# Patient Record
Sex: MALE | Race: WHITE | NOT HISPANIC OR LATINO | Employment: FULL TIME | URBAN - METROPOLITAN AREA
[De-identification: names, ages, dates, MRNs, and addresses within clinical notes are randomized per-mention and may not be internally consistent; named-entity substitution may affect disease eponyms.]

---

## 2017-01-24 ENCOUNTER — OFFICE VISIT (OUTPATIENT)
Dept: FAMILY MEDICINE | Facility: CLINIC | Age: 57
End: 2017-01-24

## 2017-01-24 VITALS
RESPIRATION RATE: 20 BRPM | SYSTOLIC BLOOD PRESSURE: 128 MMHG | WEIGHT: 188.6 LBS | HEIGHT: 68 IN | DIASTOLIC BLOOD PRESSURE: 78 MMHG | HEART RATE: 68 BPM | TEMPERATURE: 98.3 F | BODY MASS INDEX: 28.58 KG/M2

## 2017-01-24 DIAGNOSIS — J01.01 ACUTE RECURRENT MAXILLARY SINUSITIS: Primary | ICD-10-CM

## 2017-01-24 PROCEDURE — 99213 OFFICE O/P EST LOW 20 MIN: CPT | Performed by: PHYSICIAN ASSISTANT

## 2017-01-24 RX ORDER — FLUTICASONE PROPIONATE 50 MCG
1 SPRAY, SUSPENSION (ML) NASAL DAILY
Qty: 1 BOTTLE | COMMUNITY
Start: 2017-01-24 | End: 2019-04-24

## 2017-01-24 RX ORDER — DOXYCYCLINE 100 MG/1
100 CAPSULE ORAL 2 TIMES DAILY
Qty: 20 CAPSULE | Refills: 0 | Status: SHIPPED | OUTPATIENT
Start: 2017-01-24 | End: 2017-02-03

## 2017-01-24 NOTE — NURSING NOTE
Benedict De Anda is here for a possible sinus infection. Was sick before [place] and had Sx for 3 weeks. They went away a few days ago, but are now back.    Questioned patient about current smoking habits.  Pt. quit smoking some time ago.  Body mass index is 29.05 kg/(m^2).  BP Cuff left  Arm  M  Cuff  PULSE regular  My Chart:   CLASSIFICATION OF OVERWEIGHT AND OBESITY BY BMI                        Obesity Class           BMI(kg/m2)  Underweight                                    < 18.5  Normal                                         18.5-24.9  Overweight                                     25.0-29.9  OBESITY                     I                  30.0-34.9                             II                 35.0-39.9  EXTREME OBESITY             III                >40                            Patient's  BMI Body mass index is 29.05 kg/(m^2).  http://hin.nhlbi.nih.gov/menuplanner/menu.cgi    Pre-visit planning  Immunizations - up to date  Colonoscopy - is up to date  Mammogram -   Asthma -   PHQ9 -    GIA-7 -

## 2017-01-24 NOTE — MR AVS SNAPSHOT
"              After Visit Summary   2017    Benedict De Anda    MRN: [id number]           Patient Information     Date Of Birth          1960        Visit Information        Provider Department      2017 11:00 AM Brandi Santiago PA-C [facility]        Care Instructions    Take doxycycline twice daily with food for 10 days.  If not feeling significantly better in 1 week please contact me.        Follow-ups after your visit        Who to contact     If you have questions or need follow up information about today's clinic visit or your schedule please contact [facility] directly at [phone].  Normal or non-critical lab and imaging results will be communicated to you by MyChart, letter or phone within 4 business days after the clinic has received the results. If you do not hear from us within 7 days, please contact the clinic through Brilliant Telecommunications[employer] or phone. If you have a critical or abnormal lab result, we will notify you by phone as soon as possible.  Submit refill requests through AquaBling or call your pharmacy and they will forward the refill request to us. Please allow 3 business days for your refill to be completed.          Additional Information About Your Visit        MyChart Information     AquaBling lets you send messages to your doctor, view your test results, renew your prescriptions, schedule appointments and more. To sign up, go to [email or web address] . Click on \"Log in\" on the left side of the screen, which will take you to the Welcome page. Then click on \"Sign up Now\" on the right side of the page.     You will be asked to enter the access code listed below, as well as some personal information. Please follow the directions to create your username and password.     Your access code is: [email or web address]  Expires: 2017 11:20 AM     Your access code will  in 90 days. If you need help or a new code, please call your [place] clinic " "or [phone].        Care EveryWhere ID     This is your Care EveryWhere ID. This could be used by other organizations to access your [place] medical records  [id number]        Your Vitals Were     Pulse Temperature Respirations Height BMI (Body Mass Index)       68 98.3  F (36.8  C) (Oral) 20 1.716 m (5' 7.54\") 29.05 kg/m2        Blood Pressure from Last 3 Encounters:   01/24/17 128/78   03/03/15 110/70   12/31/13 148/80    Weight from Last 3 Encounters:   01/24/17 85.548 kg (188 lb 9.6 oz)   03/03/15 79.833 kg (176 lb)   12/31/13 83.915 kg (185 lb)              Today, you had the following     No orders found for display       Primary Care Provider Office Phone # Fax #    Redd Tejada [phone]       [facility] [address] [address]        Thank you!     Thank you for choosing [facility]  for your care. Our goal is always to provide you with excellent care. Hearing back from our patients is one way we can continue to improve our services. Please take a few minutes to complete the written survey that you may receive in the mail after your visit with us. Thank you!             Your Updated Medication List - Protect others around you: Learn how to safely use, store and throw away your medicines at www.disposemymeds.org.          This list is accurate as of: 1/24/17 11:20 AM.  Always use your most recent med list.                   Brand Name Dispense Instructions for use    fluticasone 50 MCG/ACT spray    FLONASE    1 Bottle    Spray 1 spray into both nostrils daily         "

## 2017-01-24 NOTE — PROGRESS NOTES
"CC: Sinus Symptoms    History:  Benedict is here today with sinus symptoms for 4 weeks since Christmas. Benedict was starting to feel better last week, but then just yesterday his sinus symptoms flared back up again. Started having drainage, facial pressure, pain, headaches, and ear pressure. Minimal throat irritation. Has a history of these roughly every year. Thinks it might be due to his allergies to cats, dust, and other environmental allergies, but he does not take anything for these on a daily basis.     No fever, sweats, chills. Has tried OTC Nyquil, and during 1st three weeks was trying all sorts of OTC meds includingdaily Flonase, which he continues to use.    PMH, MEDICATIONS, ALLERGIES, SOCIAL AND FAMILY HISTORY in [facility] and reviewed by me personally.      ROS negative other than the symptoms noted above in the HPI.        Examination   /78 mmHg  Pulse 68  Temp(Src) 98.3  F (36.8  C) (Oral)  Resp 20  Ht 1.716 m (5' 7.54\")  Wt 85.548 kg (188 lb 9.6 oz)  BMI 29.05 kg/m2       Constitutional: Sitting comfortably, in no acute distress. Vital signs noted  Eyes: pupils equal round reactive to light and accomodation, extra ocular movements intact  Ears: external canals and TMs free of abnormalities  Nose: patent, without mucosal abnormalities. Mild congestions. No sinus tenderness  Mouth and throat: without erythema or lesions of the mucosa  Neck:  no adenopathy, trachea midline and normal to palpation  Cardiovascular:  regular rate and rhythm, no murmurs, clicks, or gallops  Respiratory:  normal respiratory rate and rhythm, lungs clear to auscultation  Psychiatric: mentation appears normal and affect normal/bright        A/P    ICD-10-CM    1. Acute recurrent maxillary sinusitis J01.01 doxycycline Monohydrate 100 MG CAPS       DISCUSSION: Given duration of symptoms, amoxicillin allergy, recommended Benedict complete 10 day course ofdoxycycline. He should take this twice daily with food, and should " consider taking probiotic or eating yogurt in between. Warned him of possible side effects including loose stool. Provided him with a handout on OTC therapies based on symptoms. Asked him to contact me in 1 week if not feeling significantly better.      follow up visit: as needed    Brandi Santiago PA-C  [facility]

## 2017-01-24 NOTE — PATIENT INSTRUCTIONS
Take doxycycline twice daily with food for 10 days.  If not feeling significantly better in 1 week please contact me.

## 2017-06-21 ENCOUNTER — OFFICE VISIT (OUTPATIENT)
Dept: FAMILY MEDICINE | Facility: CLINIC | Age: 57
End: 2017-06-21

## 2017-06-21 VITALS
HEIGHT: 68 IN | SYSTOLIC BLOOD PRESSURE: 120 MMHG | HEART RATE: 60 BPM | DIASTOLIC BLOOD PRESSURE: 70 MMHG | TEMPERATURE: 98.6 F | BODY MASS INDEX: 27.58 KG/M2 | RESPIRATION RATE: 20 BRPM | WEIGHT: 182 LBS

## 2017-06-21 DIAGNOSIS — J06.9 VIRAL URI WITH COUGH: Primary | ICD-10-CM

## 2017-06-21 DIAGNOSIS — B00.1 COLD SORE: ICD-10-CM

## 2017-06-21 PROCEDURE — 99213 OFFICE O/P EST LOW 20 MIN: CPT | Performed by: PHYSICIAN ASSISTANT

## 2017-06-21 RX ORDER — VALACYCLOVIR HYDROCHLORIDE 1 G/1
2000 TABLET, FILM COATED ORAL 2 TIMES DAILY
Qty: 12 TABLET | Refills: 3 | Status: SHIPPED | OUTPATIENT
Start: 2017-06-21 | End: 2018-03-05

## 2017-06-21 NOTE — NURSING NOTE
Benedict De Anda presents with an illness characterized by productive cough with sputum, ear pain, ear plugging, nasal congestion and rhinorrhea. Symptoms began 4 days ago and are gradually worsening since that time.  Questioned patient about current smoking habits.  Pt. quit smoking some time ago.  Body mass index is 33.67 kg/(m^2).  PULSE regular  My Chart:   Body mass index is 27.88 kg/(m^2).  Pre-visit planning  Immunizations - up to date  Colonoscopy - is up to date  Mammogram -   Asthma -   PHQ9 -    GIA-7 -

## 2017-06-21 NOTE — PROGRESS NOTES
"CC: Cold    History:  Martín is here today concerned about a cold he developed this week. One grandson was diagnosed with pneumonia that they  for during the week. Another is sick as well. Was feeling a little \"funky\" on Monday. Yesterday mild symptoms. This morning symptoms were much worse- with sinus pain, headache, wet deep cough.  No fevers, sweats, chills. Was able to hike yesterday, but last night got worse. Cough is day and night. Last 2 nights stomach has been upset.     Has been using Flonase as usual. Has not tried any cough medications.    Oldest daughter is visiting middle of next week with her kids.    He also is getting a cold sore and would like refill of Valtrex.     PMH, MEDICATIONS, ALLERGIES, SOCIAL AND FAMILY HISTORY in [facility] and reviewed by me personally.      ROS negative other than the symptoms noted above in the HPI.        Examination   /70 (BP Location: Right arm, Patient Position: Chair, Cuff Size: Adult Regular)  Pulse 60  Temp 98.6  F (37  C) (Oral)  Resp 20  Ht 1.721 m (5' 7.75\")  Wt 82.6 kg (182 lb)  BMI 27.88 kg/m2       Constitutional: Sitting comfortably, in no acute distress. Vital signs noted  Eyes: pupils equal round reactive to light and accomodation, extra ocular movements intact  Ears: external canals and TMs free of abnormalities  Nose: patent, without mucosal abnormalities  Mouth and throat: without erythema or lesions of the mucosa  Neck:  no adenopathy, trachea midline and normal to palpation  Cardiovascular:  regular rate and rhythm, no murmurs, clicks, or gallops  Respiratory:  normal respiratory rate and rhythm, lungs clear to auscultation  Psychiatric: mentation appears normal and affect normal/bright        A/P    ICD-10-CM    1. Viral URI with cough J06.9     B97.89    2. Cold sore B00.1 valACYclovir (VALTREX) 1000 mg tablet       DISCUSSION: Martín symptoms are likely just a virus that needs to run it's course. Recommended taking Mucinex DM, rest, and " hydrating as much as possible. He should contact me on Monday if not better, and we could start on antibiotics at that time. He should contact me sooner with worsening symptoms.    follow up visit: As needed    [facility]

## 2017-06-21 NOTE — MR AVS SNAPSHOT
"              After Visit Summary   2017    Benedict De Anda    MRN: [id number]           Patient Information     Date Of Birth          1960        Visit Information        Provider Department      2017 10:15 AM Brandi Santiago PA-C [facility]        Today's Diagnoses     Viral URI with cough    -  1    Cold sore           Follow-ups after your visit        Follow-up notes from your care team     Return if symptoms worsen or fail to improve.      Who to contact     If you have questions or need follow up information about today's clinic visit or your schedule please contact [facility] directly at [phone].  Normal or non-critical lab and imaging results will be communicated to you by DadShedhart, letter or phone within 4 business days after the clinic has received the results. If you do not hear from us within 7 days, please contact the clinic through DadShed[employer] or phone. If you have a critical or abnormal lab result, we will notify you by phone as soon as possible.  Submit refill requests through [employer] Towers or call your pharmacy and they will forward the refill request to us. Please allow 3 business days for your refill to be completed.          Additional Information About Your Visit        MyChart Information     Russian Towers lets you send messages to your doctor, view your test results, renew your prescriptions, schedule appointments and more. To sign up, go to [email or web address] . Click on \"Log in\" on the left side of the screen, which will take you to the Welcome page. Then click on \"Sign up Now\" on the right side of the page.     You will be asked to enter the access code listed below, as well as some personal information. Please follow the directions to create your username and password.     Your access code is: [email or web address]  Expires: 2017 12:58 PM     Your access code will  in 90 days. If you need help or a new code, please call " "your [place] clinic or [phone].        Care EveryWhere ID     This is your Care EveryWhere ID. This could be used by other organizations to access your [place] medical records  [id number]        Your Vitals Were     Pulse Temperature Respirations Height BMI (Body Mass Index)       60 98.6  F (37  C) (Oral) 20 1.721 m (5' 7.75\") 27.88 kg/m2        Blood Pressure from Last 3 Encounters:   06/21/17 120/70   01/24/17 128/78   03/03/15 110/70    Weight from Last 3 Encounters:   06/21/17 82.6 kg (182 lb)   01/24/17 85.5 kg (188 lb 9.6 oz)   03/03/15 79.8 kg (176 lb)              Today, you had the following     No orders found for display         Today's Medication Changes          These changes are accurate as of: 6/21/17 12:58 PM.  If you have any questions, ask your nurse or doctor.               Start taking these medicines.        Dose/Directions    valACYclovir 1000 mg tablet   Commonly known as:  VALTREX   Used for:  Cold sore   Started by:  Brandi Santiago PA-C        Dose:  2000 mg   Take 2 tablets (2,000 mg) by mouth 2 times daily   Quantity:  12 tablet   Refills:  3            Where to get your medicines      These medications were sent to [facility] [id number] [place] [address] [address]  [address], [facility] [address]     Phone:  [phone]     valACYclovir 1000 mg tablet                Primary Care Provider Office Phone # Fax #    Redd Tejada [phone]       [facility] [address] [facility] [address]  [facility] [address]        Equal Access to Services     KARIN SALGADO AH: Sharon Espinosa, waamina garibay, qayossita kaalmada ivan, lala kirk. So [facility] [phone].    ATENCIÓN: Si habla español, tiene a gipson disposición servicios gratuitos de asistencia lingüística. Latha al [phone].    We comply with applicable federal civil rights " laws and [place] laws. We do not discriminate on the basis of race, color, national origin, age, disability sex, sexual orientation or gender identity.            Thank you!     Thank you for choosing [facility]  for your care. Our goal is always to provide you with excellent care. Hearing back from our patients is one way we can continue to improve our services. Please take a few minutes to complete the written survey that you may receive in the mail after your visit with us. Thank you!             Your Updated Medication List - Protect others around you: Learn how to safely use, store and throw away your medicines at www.disposemymeds.org.          This list is accurate as of: 6/21/17 12:58 PM.  Always use your most recent med list.                   Brand Name Dispense Instructions for use Diagnosis    fluticasone 50 MCG/ACT spray    FLONASE    1 Bottle    Spray 1 spray into both nostrils daily        valACYclovir 1000 mg tablet    VALTREX    12 tablet    Take 2 tablets (2,000 mg) by mouth 2 times daily    Cold sore

## 2018-02-22 ENCOUNTER — OFFICE VISIT (OUTPATIENT)
Dept: FAMILY MEDICINE | Facility: CLINIC | Age: 58
End: 2018-02-22

## 2018-02-22 VITALS
BODY MASS INDEX: 27.22 KG/M2 | HEART RATE: 65 BPM | OXYGEN SATURATION: 98 % | WEIGHT: 179.6 LBS | SYSTOLIC BLOOD PRESSURE: 128 MMHG | TEMPERATURE: 98.9 F | DIASTOLIC BLOOD PRESSURE: 82 MMHG | HEIGHT: 68 IN

## 2018-02-22 DIAGNOSIS — Z29.89 ALTITUDE SICKNESS PROPHYLAXIS: ICD-10-CM

## 2018-02-22 DIAGNOSIS — Z00.00 ROUTINE GENERAL MEDICAL EXAMINATION AT A HEALTH CARE FACILITY: Primary | ICD-10-CM

## 2018-02-22 PROCEDURE — 84153 ASSAY OF PSA TOTAL: CPT | Mod: 90 | Performed by: FAMILY MEDICINE

## 2018-02-22 PROCEDURE — 99396 PREV VISIT EST AGE 40-64: CPT | Performed by: FAMILY MEDICINE

## 2018-02-22 PROCEDURE — 80061 LIPID PANEL: CPT | Mod: 90 | Performed by: FAMILY MEDICINE

## 2018-02-22 PROCEDURE — 36415 COLL VENOUS BLD VENIPUNCTURE: CPT | Performed by: FAMILY MEDICINE

## 2018-02-22 PROCEDURE — 86803 HEPATITIS C AB TEST: CPT | Mod: 90 | Performed by: FAMILY MEDICINE

## 2018-02-22 PROCEDURE — 80053 COMPREHEN METABOLIC PANEL: CPT | Mod: 90 | Performed by: FAMILY MEDICINE

## 2018-02-22 RX ORDER — ACETAZOLAMIDE 500 MG/1
500 CAPSULE, EXTENDED RELEASE ORAL 2 TIMES DAILY
Qty: 30 CAPSULE | Refills: 0 | Status: SHIPPED | OUTPATIENT
Start: 2018-02-22 | End: 2018-11-29

## 2018-02-22 ASSESSMENT — ANXIETY QUESTIONNAIRES
1. FEELING NERVOUS, ANXIOUS, OR ON EDGE: NOT AT ALL
GAD7 TOTAL SCORE: 1
2. NOT BEING ABLE TO STOP OR CONTROL WORRYING: NOT AT ALL
3. WORRYING TOO MUCH ABOUT DIFFERENT THINGS: NOT AT ALL
IF YOU CHECKED OFF ANY PROBLEMS ON THIS QUESTIONNAIRE, HOW DIFFICULT HAVE THESE PROBLEMS MADE IT FOR YOU TO DO YOUR WORK, TAKE CARE OF THINGS AT HOME, OR GET ALONG WITH OTHER PEOPLE: NOT DIFFICULT AT ALL
6. BECOMING EASILY ANNOYED OR IRRITABLE: SEVERAL DAYS
5. BEING SO RESTLESS THAT IT IS HARD TO SIT STILL: NOT AT ALL
7. FEELING AFRAID AS IF SOMETHING AWFUL MIGHT HAPPEN: NOT AT ALL

## 2018-02-22 ASSESSMENT — PATIENT HEALTH QUESTIONNAIRE - PHQ9: 5. POOR APPETITE OR OVEREATING: NOT AT ALL

## 2018-02-22 NOTE — NURSING NOTE
Benedict KARON De Anda is here for a full physical exam.     FASTING: Yes HOURS: 12+    Pre-Visit Screening :    Immunizations : up to date  Colon Screening : is up to date  Asthma Action Test/Plan : NA  PHQ9 :  is completed today  GAD7 :  is completed today    Patient's  BMI Body mass index is 27.71 kg/(m^2).    Questioned patient about current smoking habits.  Pt. quit smoking some time ago.    ETOH screening:    Questions:  1-How often do you have a drink containing alcohol?                             2 times per week(s)  2-How many drinks containing alcohol do you have on a typical day when you are         Drinking?                             2 to 5   3- How often do you have 5 or more drinks on one occasion?                             1 time a month       Is it okay to leave a detailed message on cell phone's voicemail regarding today's visit? Yes    Phone # [phone]      Roomed By: Gregoria Francis CMA

## 2018-02-22 NOTE — PROGRESS NOTES
SUBJECTIVE:   CC: Benedict De Anda is an 57 year old male who presents for preventative health visit.     Healthy Habits:    Do you get at least three servings of calcium containing foods daily (dairy, green leafy vegetables, etc.)? yes    Amount of exercise or daily activities, outside of work: 5 day(s) per week    Problems taking medications regularly No    Medication side effects: No    Have you had an eye exam in the past two years? yes    Do you see a dentist twice per year? 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    Abuse: Current or Past(Physical, Sexual or Emotional)- No  Do you feel safe in your environment - Yes    Social History   Substance Use Topics     Smoking status: Former Smoker     Smokeless tobacco: Never Used      Comment: stopped age 26     Alcohol use 6.0 oz/week     10 Standard drinks or equivalent per week      If you drink alcohol do you typically have >3 drinks per day or >7 drinks per week? No                      Last PSA: No results found for: PSA    Reviewed orders with patient. Reviewed health maintenance and updated orders accordingly - Yes  BP Readings from Last 3 Encounters:   02/22/18 128/82   06/21/17 120/70   01/24/17 128/78    Wt Readings from Last 3 Encounters:   02/22/18 81.5 kg (179 lb 9.6 oz)   06/21/17 82.6 kg (182 lb)   01/24/17 85.5 kg (188 lb 9.6 oz)                  Patient Active Problem List   Diagnosis     Herpes simplex virus (HSV) infection     Allergic rhinitis     ED (erectile dysfunction)     Health Care Home     ACP (advance care planning)     Past Surgical History:   Procedure Laterality Date     HC KNEE SCOPE, W/LATERAL RELEASE  1992    knee spur       Social History   Substance Use Topics     Smoking status: Former Smoker     Smokeless tobacco: Never Used      Comment: stopped age 26     Alcohol use 6.0 oz/week     10 Standard  drinks or equivalent per week     Family History   Problem Relation Age of Onset     C.A.D. Father      bypass     Hypertension Father      Lipids Father          Current Outpatient Prescriptions   Medication Sig Dispense Refill     valACYclovir (VALTREX) 1000 mg tablet Take 2 tablets (2,000 mg) by mouth 2 times daily 12 tablet 3     fluticasone (FLONASE) 50 MCG/ACT spray Spray 1 spray into both nostrils daily 1 Bottle      Allergies   Allergen Reactions     Amoxicillin Hives     Recent Labs   Lab Test  03/22/11   0945   CR  0.97   GFRESTIMATED  82   GFRESTBLACK  >90   POTASSIUM  4.3        Reviewed and updated as needed this visit by clinical staff  Tobacco  Allergies  Meds  Problems         Reviewed and updated as needed this visit by Provider        Past Medical History:   Diagnosis Date     Allergic rhinitis, cause unspecified 8/22/2005    testing 2006     Herpes simplex without mention of complication 8/22/2005      Past Surgical History:   Procedure Laterality Date      KNEE SCOPE, W/LATERAL RELEASE  1992    knee spur       ROS:  C: NEGATIVE for fever, chills, change in weight  I: NEGATIVE for worrisome rashes, moles or lesions  E: NEGATIVE for vision changes or irritation  ENT: NEGATIVE for ear, mouth and throat problems  R: NEGATIVE for significant cough or SOB  CV: NEGATIVE for chest pain, palpitations or peripheral edema  GI: NEGATIVE for nausea, abdominal pain, heartburn, or change in bowel habits   male: negative for dysuria, hematuria, decreased urinary stream, erectile dysfunction, urethral discharge  M: NEGATIVE for significant arthralgias or myalgia  N: NEGATIVE for weakness, dizziness or paresthesias  E: NEGATIVE for temperature intolerance, skin/hair changes  H: NEGATIVE for bleeding problems  P: NEGATIVE for changes in mood or affect    OBJECTIVE:   /82 (BP Location: Left arm, Patient Position: Chair, Cuff Size: Adult Large)  Pulse 65  Temp 98.9  F (37.2  C) (Oral)  Ht 1.715 m (5'  "7.5\")  Wt 81.5 kg (179 lb 9.6 oz)  SpO2 98%  BMI 27.71 kg/m2  EXAM:  GENERAL: healthy, alert and no distress  EYES: Eyes grossly normal to inspection, PERRL and conjunctivae and sclerae normal  HENT: ear canals and TM's normal, nose and mouth without ulcers or lesions  NECK: no adenopathy, no asymmetry, masses, or scars and thyroid normal to palpation  RESP: lungs clear to auscultation - no rales, rhonchi or wheezes  CV: regular rate and rhythm, normal S1 S2, no S3 or S4, no murmur, click or rub, no peripheral edema and peripheral pulses strong  ABDOMEN: soft, nontender, no hepatosplenomegaly, no masses and bowel sounds normal   (male): normal male genitalia without lesions or urethral discharge, no hernia-has varicocele on left  RECTAL (male): deferred  MS: no gross musculoskeletal defects noted, no edema  SKIN: no suspicious lesions or rashes  NEURO: Normal strength and tone, mentation intact and speech normal  PSYCH: mentation appears normal, affect normal/bright  LYMPH: no cervical, supraclavicular, axillary, or inguinal adenopathy    ASSESSMENT/PLAN:   (Z00.00) Routine general medical examination at a health care facility  (primary encounter diagnosis)  Comment: discussed preventitive healthcare   Plan: Continue to work on healthy diet and exercise, discussed healthy habits     (Z29.8) Altitude sickness prophylaxis  Comment: discussed diamox-he is going to [facility] at [address] feet  Plan: I reviewed the risks, benefits, and possible side effects of the medication.  The patient had an opportunity to ask any questions regarding the treatment plan. The patient was encouraged to call my office if any problems.     COUNSELING:  Reviewed preventive health counseling, as reflected in patient instructions       Regular exercise       Healthy diet/nutrition       Vision screening       Colon cancer screening       Prostate cancer screening       reports that he has quit smoking. He has never used smokeless " "tobacco.    Estimated body mass index is 27.71 kg/(m^2) as calculated from the following:    Height as of this encounter: 1.715 m (5' 7.5\").    Weight as of this encounter: 81.5 kg (179 lb 9.6 oz).   Weight management plan: Discussed healthy diet and exercise guidelines and patient will follow up in 12 months in clinic to re-evaluate.    Counseling Resources:  ATP IV Guidelines  Pooled Cohorts Equation Calculator  FRAX Risk Assessment  ICSI Preventive Guidelines  Dietary Guidelines for Americans, 2010  USDA's MyPlate  ASA Prophylaxis  Lung CA Screening    Redd Tejada MD  [facility]  "

## 2018-02-22 NOTE — LETTER
February 27, 2018      Benedict S Adilson  [address]  [facility] [address]      Benedict BRANTLEY Adilson     Your liver tests returned elevated in a pattern most consistent with excessive alcohol use.  I would recommend decreasing alcohol intake and we can recheck in 6 months.    The results of your recent hepatitis C test, Blood Sugar, Kidney Tests, Lipid profile and PSA were essentially within normal limits. The results are now released on My Chart. Please contact me if you have further questions or concerns.    Sincerely,    JOSE DAVID Tejada M.D.     Resulted Orders   Lipid Profile (QUEST)   Result Value Ref Range    Cholesterol 137 <200 mg/dL    HDL Cholesterol 51 >40 mg/dL    Triglycerides 46 <150 mg/dL    LDL Cholesterol Calculated 73 mg/dL (calc)      Comment:      Reference range: <100     Desirable range <100 mg/dL for patients with CHD or  diabetes and <70 mg/dL for diabetic patients with  known heart disease.     LDL-C is now calculated using the Jose   calculation, which is a validated novel method providing   better accuracy than the Friedewald equation in the   estimation of LDL-C.   Lavelle SS et al. TD. 2013;310(19): 9281-3332   (http://education.SeGan Angel Prints.SafetySkills/faq/PQV992)      Cholesterol/HDL Ratio 2.7 <5.0 (calc)    Non HDL Cholesterol 86 <130 mg/dL (calc)      Comment:      For patients with diabetes plus 1 major ASCVD risk   factor, treating to a non-HDL-C goal of <100 mg/dL   (LDL-C of <70 mg/dL) is considered a therapeutic   option.     COMPREHENSIVE METABOLIC PANEL (QUEST) XCMP   Result Value Ref Range    Glucose 101 (H) 65 - 99 mg/dL      Comment:                    Fasting reference interval     For someone without known diabetes, a glucose value  between 100 and 125 mg/dL is consistent with  prediabetes and should be confirmed with a  follow-up test.         Urea Nitrogen 19 7 - 25 mg/dL    Creatinine 1.11 0.70 - 1.33 mg/dL      Comment:      For patients >49  years of age, the reference limit  for Creatinine is approximately 13% higher for people  identified as -American.         GFR Estimate 74 > OR = 60 mL/min/1.73m2    EGFR African American 86 > OR = 60 mL/min/1.73m2    BUN/Creatinine Ratio NOT APPLICABLE 6 - 22 (calc)    Sodium 145 135 - 146 mmol/L    Potassium 4.4 3.5 - 5.3 mmol/L    Chloride 109 98 - 110 mmol/L    Carbon Dioxide 18 (L) 20 - 31 mmol/L    Calcium 9.4 8.6 - 10.3 mg/dL    Protein Total 7.3 6.1 - 8.1 g/dL    Albumin 4.7 3.6 - 5.1 g/dL    Globulin Calculated 2.6 1.9 - 3.7 g/dL (calc)    A/G Ratio 1.8 1.0 - 2.5 (calc)    Bilirubin Total 0.9 0.2 - 1.2 mg/dL    Alkaline Phosphatase 62 40 - 115 U/L     (H) 10 - 35 U/L    ALT 62 (H) 9 - 46 U/L   Hepatits C antibody (QUEST)   Result Value Ref Range    HCV Antibody NON-REACTIVE NON-REACTIVE    SIGNAL TO CUT OFF - QUEST 0.01 <1.00   HCL PSA, SCREENING (QUEST)   Result Value Ref Range    Abbott PSA 0.8 < OR = 4.0 ng/mL      Comment:      The total PSA value from this assay system is   standardized against the WHO standard. The test   result will be approximately 20% lower when compared   to the equimolar-standardized total PSA (Rodrigo   [place]). Comparison of serial PSA results should be   interpreted with this fact in mind.     This test was performed using the Siemens   chemiluminescent method. Values obtained from   different assay methods cannot be used  interchangeably. PSA levels, regardless of  value, should not be interpreted as absolute  evidence of the presence or absence of disease.         If you have any questions or concerns, please call the clinic at the number listed above.       Sincerely,        Redd Tejada MD

## 2018-02-22 NOTE — MR AVS SNAPSHOT
"              After Visit Summary   2/22/2018    Benedict De Anda    MRN: [id number]           Patient Information     Date Of Birth          1960        Visit Information        Provider Department      2/22/2018 12:30 PM Redd Tejada MD [facility]        Today's Diagnoses     Routine general medical examination at a health care facility    -  1    Altitude sickness prophylaxis           Follow-ups after your visit        Who to contact     If you have questions or need follow up information about today's clinic visit or your schedule please contact [facility] directly at [phone].  Normal or non-critical lab and imaging results will be communicated to you by MyChart, letter or phone within 4 business days after the clinic has received the results. If you do not hear from us within 7 days, please contact the clinic through MyChart or phone. If you have a critical or abnormal lab result, we will notify you by phone as soon as possible.  Submit refill requests through H.BLOOM or call your pharmacy and they will forward the refill request to us. Please allow 3 business days for your refill to be completed.          Additional Information About Your Visit        Care EveryWhere ID     This is your Care EveryWhere ID. This could be used by other organizations to access your [place] medical records  [id number]        Your Vitals Were     Pulse Temperature Height Pulse Oximetry BMI (Body Mass Index)       65 98.9  F (37.2  C) (Oral) 1.715 m (5' 7.5\") 98% 27.71 kg/m2        Blood Pressure from Last 3 Encounters:   02/22/18 128/82   06/21/17 120/70   01/24/17 128/78    Weight from Last 3 Encounters:   02/22/18 81.5 kg (179 lb 9.6 oz)   06/21/17 82.6 kg (182 lb)   01/24/17 85.5 kg (188 lb 9.6 oz)              We Performed the Following     COMPREHENSIVE METABOLIC PANEL (QUEST) XCMP     HCL PSA, SCREENING (QUEST)     Hepatits C antibody (QUEST)     " Lipid Profile (QUEST)     VENOUS COLLECTION          Today's Medication Changes          These changes are accurate as of 2/22/18  1:16 PM.  If you have any questions, ask your nurse or doctor.               Start taking these medicines.        Dose/Directions    acetaZOLAMIDE 500 MG 12 hr capsule   Commonly known as:  DIAMOX SEQUEL   Used for:  Altitude sickness prophylaxis   Started by:  Redd Tejada MD        Dose:  500 mg   Take 1 capsule (500 mg) by mouth 2 times daily   Quantity:  30 capsule   Refills:  0            Where to get your medicines      These medications were sent to [facility] [address] - [place], [place] - [address]  [address], [facility] [address]     Phone:  [phone]     acetaZOLAMIDE 500 MG 12 hr capsule                Primary Care Provider Office Phone # Fax #    Redd Tejada [phone] [address] [facility] [address]  [facility] [address]        Equal Access to Services     [facility] AH: Hadii aad ku hadasho Soomaali, waaxda luqadaha, qaybta kaalmada adeegyada, waxay idiin hayaan adeeg kharash la'sonja . So [facility] [phone].    ATENCIÓN: Si habla español, tiene a gipson disposición servicios gratuitos de asistencia lingüística. [facility] [phone].    We comply with applicable federal civil rights laws and [place] laws. We do not discriminate on the basis of race, color, national origin, age, disability, sex, sexual orientation, or gender identity.            Thank you!     Thank you for choosing [facility]  for your care. Our goal is always to provide you with excellent care. Hearing back from our patients is one way we can continue to improve our services. Please take a few minutes to complete the written survey that you may receive in the mail after your visit with us. Thank you!             Your Updated Medication List - Protect others around you: Learn  how to safely use, store and throw away your medicines at www.disposemymeds.org.          This list is accurate as of 2/22/18  1:16 PM.  Always use your most recent med list.                   Brand Name Dispense Instructions for use Diagnosis    acetaZOLAMIDE 500 MG 12 hr capsule    DIAMOX SEQUEL    30 capsule    Take 1 capsule (500 mg) by mouth 2 times daily    Altitude sickness prophylaxis       fluticasone 50 MCG/ACT spray    FLONASE    1 Bottle    Spray 1 spray into both nostrils daily        valACYclovir 1000 mg tablet    VALTREX    12 tablet    Take 2 tablets (2,000 mg) by mouth 2 times daily    Cold sore

## 2018-02-23 LAB
ABBOTT PSA - QUEST: 0.8 NG/ML
ALBUMIN SERPL-MCNC: 4.7 G/DL (ref 3.6–5.1)
ALBUMIN/GLOB SERPL: 1.8 (CALC) (ref 1–2.5)
ALP SERPL-CCNC: 62 U/L (ref 40–115)
ALT SERPL-CCNC: 62 U/L (ref 9–46)
AST SERPL-CCNC: 188 U/L (ref 10–35)
BILIRUB SERPL-MCNC: 0.9 MG/DL (ref 0.2–1.2)
BUN SERPL-MCNC: 19 MG/DL (ref 7–25)
BUN/CREATININE RATIO: ABNORMAL (CALC) (ref 6–22)
CALCIUM SERPL-MCNC: 9.4 MG/DL (ref 8.6–10.3)
CHLORIDE SERPLBLD-SCNC: 109 MMOL/L (ref 98–110)
CHOLEST SERPL-MCNC: 137 MG/DL
CHOLEST/HDLC SERPL: 2.7 (CALC)
CO2 SERPL-SCNC: 18 MMOL/L (ref 20–31)
CREAT SERPL-MCNC: 1.11 MG/DL (ref 0.7–1.33)
EGFR AFRICAN AMERICAN - QUEST: 86 ML/MIN/1.73M2
GFR SERPL CREATININE-BSD FRML MDRD: 74 ML/MIN/1.73M2
GLOBULIN, CALCULATED - QUEST: 2.6 G/DL (CALC) (ref 1.9–3.7)
GLUCOSE - QUEST: 101 MG/DL (ref 65–99)
HCV AB - QUEST: NORMAL
HDLC SERPL-MCNC: 51 MG/DL
LDLC SERPL CALC-MCNC: 73 MG/DL (CALC)
NONHDLC SERPL-MCNC: 86 MG/DL (CALC)
POTASSIUM SERPL-SCNC: 4.4 MMOL/L (ref 3.5–5.3)
PROT SERPL-MCNC: 7.3 G/DL (ref 6.1–8.1)
SIGNAL TO CUT OFF - QUEST: 0.01
SODIUM SERPL-SCNC: 145 MMOL/L (ref 135–146)
TRIGL SERPL-MCNC: 46 MG/DL

## 2018-02-23 ASSESSMENT — PATIENT HEALTH QUESTIONNAIRE - PHQ9: SUM OF ALL RESPONSES TO PHQ QUESTIONS 1-9: 4

## 2018-02-23 ASSESSMENT — ANXIETY QUESTIONNAIRES: GAD7 TOTAL SCORE: 1

## 2018-03-05 ENCOUNTER — OFFICE VISIT (OUTPATIENT)
Dept: FAMILY MEDICINE | Facility: CLINIC | Age: 58
End: 2018-03-05

## 2018-03-05 VITALS
WEIGHT: 183 LBS | DIASTOLIC BLOOD PRESSURE: 80 MMHG | SYSTOLIC BLOOD PRESSURE: 130 MMHG | HEART RATE: 70 BPM | BODY MASS INDEX: 28.24 KG/M2 | TEMPERATURE: 98.2 F | OXYGEN SATURATION: 97 %

## 2018-03-05 DIAGNOSIS — B00.1 COLD SORE: ICD-10-CM

## 2018-03-05 DIAGNOSIS — R05.9 COUGH: Primary | ICD-10-CM

## 2018-03-05 PROCEDURE — 99213 OFFICE O/P EST LOW 20 MIN: CPT | Performed by: FAMILY MEDICINE

## 2018-03-05 RX ORDER — AZITHROMYCIN 250 MG/1
TABLET, FILM COATED ORAL
Qty: 6 TABLET | Refills: 0 | Status: SHIPPED | OUTPATIENT
Start: 2018-03-05 | End: 2018-11-29

## 2018-03-05 RX ORDER — VALACYCLOVIR HYDROCHLORIDE 1 G/1
2000 TABLET, FILM COATED ORAL 2 TIMES DAILY
Qty: 12 TABLET | Refills: 3 | Status: SHIPPED | OUTPATIENT
Start: 2018-03-05 | End: 2021-11-18

## 2018-03-05 NOTE — PROGRESS NOTES
SUBJECTIVE:   Benedict De Anda is a 57 year old male who complains of nasal congestion, headache, mild sore throat, earache, dry cough and fatigue for 7 days. He denies a history of productive cough, shortness of breath, vomiting and chest pain and denies a history of asthma. Patient does not smoke cigarettes.    GD had bad cold -felt to be viral    Pt leaving on trip to [facility] in 2 weeks    Patient Active Problem List   Diagnosis     Herpes simplex virus (HSV) infection     Allergic rhinitis     ED (erectile dysfunction)     Health Care Home     ACP (advance care planning)     Past Medical History:   Diagnosis Date     Allergic rhinitis, cause unspecified 8/22/2005    testing 2006     Herpes simplex without mention of complication 8/22/2005     Family History   Problem Relation Age of Onset     C.A.D. Father      bypass     Hypertension Father      Lipids Father      Social History     Social History     Marital status:      Spouse name: N/A     Number of children: 3     Years of education: N/A     Occupational History     Not on file.     Social History Main Topics     Smoking status: Former Smoker     Smokeless tobacco: Never Used      Comment: stopped age 26     Alcohol use 6.0 oz/week     10 Standard drinks or equivalent per week     Drug use: Not on file     Sexual activity: Yes     Partners: Female     Birth control/ protection: Surgical      Comment: hysterectomy     Other Topics Concern      Service No     Blood Transfusions No     Caffeine Concern No     coffee 2-3 cups     Occupational Exposure No     Hobby Hazards No     Sleep Concern No     Stress Concern No     Weight Concern No     Special Diet No     Back Care No     Exercise Yes     club member     Seat Belt Yes     Self-Exams Yes     Social History Narrative     Past Surgical History:   Procedure Laterality Date      KNEE SCOPE, W/LATERAL RELEASE  1992    knee spur       Current Outpatient Prescriptions on File Prior to  Visit:  acetaZOLAMIDE (DIAMOX SEQUEL) 500 MG 12 hr capsule Take 1 capsule (500 mg) by mouth 2 times daily   valACYclovir (VALTREX) 1000 mg tablet Take 2 tablets (2,000 mg) by mouth 2 times daily   fluticasone (FLONASE) 50 MCG/ACT spray Spray 1 spray into both nostrils daily     No current facility-administered medications on file prior to visit.      Allergies: Amoxicillin    Immunization History   Administered Date(s) Administered     Influenza Vaccine IM 3yrs+ 4 Valent IIV4 10/11/2017     TDAP Vaccine (Boostrix) 08/19/2009         OBJECTIVE:/80 (BP Location: Right arm, Patient Position: Chair, Cuff Size: Adult Large)  Pulse 70  Temp 98.2  F (36.8  C) (Oral)  Wt 83 kg (183 lb)  SpO2 97%  BMI 28.24 kg/m2   He appears well, vital signs are as noted by the nurse. Ears normal.  Throat and pharynx normal.  Neck supple. No adenopathy in the neck. Nose is congested. Sinuses non tender. The chest is clear, without wheezes or rales.    Cold sore left lip margin    ASSESSMENT:   Viral upper respiratory illness- Discussed viral vs. bacterial infections and need to avoid unnecessary prescribing of antibiotics to ensure that no resistance will develop. Will give prescription for antibiotic (see orders) to fill is symptoms do not improve in the next 2-3 days.     PLAN:  Symptomatic therapy suggested: push fluids, rest and use acetaminophen, ibuprofen as needed. Call or return to clinic prn if these symptoms worsen or fail to improve as anticipated.

## 2018-03-05 NOTE — MR AVS SNAPSHOT
After Visit Summary   3/5/2018    Benedict De Anda    MRN: [id number]           Patient Information     Date Of Birth          1960        Visit Information        Provider Department      3/5/2018 2:00 PM Redd Tejada MD [facility]        Today's Diagnoses     Cough    -  1    Cold sore           Follow-ups after your visit        Who to contact     If you have questions or need follow up information about today's clinic visit or your schedule please contact [facility] directly at [phone].  Normal or non-critical lab and imaging results will be communicated to you by MyChart, letter or phone within 4 business days after the clinic has received the results. If you do not hear from us within 7 days, please contact the clinic through MyChart or phone. If you have a critical or abnormal lab result, we will notify you by phone as soon as possible.  Submit refill requests through [employer] or call your pharmacy and they will forward the refill request to us. Please allow 3 business days for your refill to be completed.          Additional Information About Your Visit        Care EveryWhere ID     This is your Care EveryWhere ID. This could be used by other organizations to access your [place] medical records  [id number]        Your Vitals Were     Pulse Temperature Pulse Oximetry BMI (Body Mass Index)          70 98.2  F (36.8  C) (Oral) 97% 28.24 kg/m2         Blood Pressure from Last 3 Encounters:   03/05/18 130/80   02/22/18 128/82   06/21/17 120/70    Weight from Last 3 Encounters:   03/05/18 83 kg (183 lb)   02/22/18 81.5 kg (179 lb 9.6 oz)   06/21/17 82.6 kg (182 lb)              Today, you had the following     No orders found for display         Today's Medication Changes          These changes are accurate as of 3/5/18  2:25 PM.  If you have any questions, ask your nurse or doctor.               Start taking these medicines.         Dose/Directions    azithromycin 250 MG tablet   Commonly known as:  ZITHROMAX   Used for:  Cough   Started by:  Redd Tejada MD        Two tablets first day, then one tablet daily for four days.   Quantity:  6 tablet   Refills:  0            Where to get your medicines      These medications were sent to [facility] - [place], [place] - [address]  [address], [facility] [address]     Phone:  [phone]     azithromycin 250 MG tablet    valACYclovir 1000 mg tablet                Primary Care Provider Office Phone # Fax #    Redd Tejada [phone] [address] [facility] [address]  [facility] [address]        Equal Access to Services     [facility] : Hadii lexi ku hadasho Soomaali, waaxda luqadaha, qaybta kaalmada adeegyada, waxay idiin haylashayn neil kim . So [facility] [phone].    ATENCIÓN: Si habla español, tiene a gipson disposición servicios gratuitos de asistencia lingüística. [facility] [phone].    We comply with applicable federal civil rights laws and [place] laws. We do not discriminate on the basis of race, color, national origin, age, disability, sex, sexual orientation, or gender identity.            Thank you!     Thank you for choosing [facility]  for your care. Our goal is always to provide you with excellent care. Hearing back from our patients is one way we can continue to improve our services. Please take a few minutes to complete the written survey that you may receive in the mail after your visit with us. Thank you!             Your Updated Medication List - Protect others around you: Learn how to safely use, store and throw away your medicines at www.disposemymeds.org.          This list is accurate as of 3/5/18  2:25 PM.  Always use your most recent med list.                   Brand Name Dispense Instructions for use Diagnosis    acetaZOLAMIDE  500 MG 12 hr capsule    DIAMOX SEQUEL    30 capsule    Take 1 capsule (500 mg) by mouth 2 times daily    Altitude sickness prophylaxis       azithromycin 250 MG tablet    ZITHROMAX    6 tablet    Two tablets first day, then one tablet daily for four days.    Cough       fluticasone 50 MCG/ACT spray    FLONASE    1 Bottle    Spray 1 spray into both nostrils daily        valACYclovir 1000 mg tablet    VALTREX    12 tablet    Take 2 tablets (2,000 mg) by mouth 2 times daily    Cold sore

## 2018-03-05 NOTE — NURSING NOTE
Benedict is here for sinus and chest issues    Pre-visit Screening:  Immunizations:  up to date  Colonoscopy:  is up to date  Mammogram: NA  Asthma Action Test/Plan:  MARIANO  PHQ9:  NA  GAD7:  NA  Questioned patient about current smoking habits Pt. has never smoked.  Ok to leave detailed message on voice mail for today's visit only Yes, phone # [phone]

## 2018-11-29 ENCOUNTER — OFFICE VISIT (OUTPATIENT)
Dept: FAMILY MEDICINE | Facility: CLINIC | Age: 58
End: 2018-11-29

## 2018-11-29 VITALS
HEIGHT: 67 IN | DIASTOLIC BLOOD PRESSURE: 72 MMHG | HEART RATE: 60 BPM | SYSTOLIC BLOOD PRESSURE: 136 MMHG | BODY MASS INDEX: 28.47 KG/M2 | TEMPERATURE: 97.7 F | RESPIRATION RATE: 20 BRPM | WEIGHT: 181.4 LBS

## 2018-11-29 DIAGNOSIS — M77.01 MEDIAL EPICONDYLITIS OF ELBOW, RIGHT: Primary | ICD-10-CM

## 2018-11-29 PROCEDURE — 99213 OFFICE O/P EST LOW 20 MIN: CPT | Performed by: FAMILY MEDICINE

## 2018-11-29 RX ORDER — IBUPROFEN 600 MG/1
600 TABLET, FILM COATED ORAL 2 TIMES DAILY WITH MEALS
Qty: 60 TABLET | Refills: 0 | Status: SHIPPED | OUTPATIENT
Start: 2018-11-29 | End: 2021-11-18

## 2018-11-29 NOTE — NURSING NOTE
Benedict De Anda is here for left elbow pain for the past couple months. Has been getting worse.    Questioned patient about current smoking habits.  Pt. quit smoking some time ago.  PULSE regular  My Chart:   CLASSIFICATION OF OVERWEIGHT AND OBESITY BY BMI                        Obesity Class           BMI(kg/m2)  Underweight                                    < 18.5  Normal                                         18.5-24.9  Overweight                                     25.0-29.9  OBESITY                     I                  30.0-34.9                             II                 35.0-39.9  EXTREME OBESITY             III                >40                            Patient's  BMI Body mass index is 28.41 kg/(m^2).  http://hin.nhlbi.nih.gov/menuplanner/menu.cgi  Pre-visit planning  Immunizations - up to date  Colonoscopy - is up to date  Mammogram - na  Asthma -   PHQ9 -    GIA-7 -

## 2018-11-29 NOTE — MR AVS SNAPSHOT
"              After Visit Summary   11/29/2018    Benedict De Anda    MRN: [id number]           Patient Information     Date Of Birth          1960        Visit Information        Provider Department      11/29/2018 3:15 PM Sabina Mora MD [facility]        Today's Diagnoses     Medial epicondylitis of elbow, right    -  1      Care Instructions    This condition has been fully explained to the patient, who indicates understanding.   Treatment plan:Rehab stretches/ exercises to begin immediately and given in writing with illustrations.   rest the arm as much as practical;  use of heat and/or ice may be helpful prn; NSAID's given as trial today; consider steroid injection. If not improved call as needed.            Follow-ups after your visit        Follow-up notes from your care team     Return if symptoms worsen or fail to improve.      Who to contact     If you have questions or need follow up information about today's clinic visit or your schedule please contact [facility] directly at [phone].  Normal or non-critical lab and imaging results will be communicated to you by Constellation Researchhart, letter or phone within 4 business days after the clinic has received the results. If you do not hear from us within 7 days, please contact the clinic through Tappxt or phone. If you have a critical or abnormal lab result, we will notify you by phone as soon as possible.  Submit refill requests through Lab4U or call your pharmacy and they will forward the refill request to us. Please allow 3 business days for your refill to be completed.          Additional Information About Your Visit        Constellation ResearchharWanderu Information     Lab4U lets you send messages to your doctor, view your test results, renew your prescriptions, schedule appointments and more. To sign up, go to [email or web address] . Click on \"Log in\" on the left side of the screen, which will take you to the Welcome " "page. Then click on \"Sign up Now\" on the right side of the page.     You will be asked to enter the access code listed below, as well as some personal information. Please follow the directions to create your username and password.     Your access code is: [email or web address]  Expires: 2019  3:06 PM     Your access code will  in 90 days. If you need help or a new code, please call your [place] clinic or [phone].        Care EveryWhere ID     This is your Care EveryWhere ID. This could be used by other organizations to access your [place] medical records  [id number]        Your Vitals Were     Pulse Temperature Respirations Height BMI (Body Mass Index)       60 97.7  F (36.5  C) (Oral) 20 1.702 m (5' 7\") 28.41 kg/m2        Blood Pressure from Last 3 Encounters:   18 136/72   18 130/80   18 128/82    Weight from Last 3 Encounters:   18 82.3 kg (181 lb 6.4 oz)   18 83 kg (183 lb)   18 81.5 kg (179 lb 9.6 oz)              Today, you had the following     No orders found for display         Today's Medication Changes          These changes are accurate as of 18  3:39 PM.  If you have any questions, ask your nurse or doctor.               Start taking these medicines.        Dose/Directions    ibuprofen 600 MG tablet   Commonly known as:  ADVIL/MOTRIN   Used for:  Medial epicondylitis of elbow, right   Started by:  Sabina Mora MD        Dose:  600 mg   Take 1 tablet (600 mg) by mouth 2 times daily (with meals)   Quantity:  60 tablet   Refills:  0            Where to get your medicines      These medications were sent to [facility] - [facility] [address], [facility] [address]     Phone:  [phone]     ibuprofen 600 MG tablet                Primary Care Provider Office Phone # Fax #    Redd Tejada [phone]       [facility] [address]  [facility] [address]        Equal Access to Services     [facility] DAMIAN : Hadii aad ku hadautumnshaina Yaryali, waferdinandda luqdiogenesha, qayossita pemaashlylala neil. So [facility] [phone].    ATENCIÓN: Si habla español, tiene a gipson disposición servicios gratuitos de asistencia lingüística. Angelicaame al [phone].    We comply with applicable federal civil rights laws and [place] laws. We do not discriminate on the basis of race, color, national origin, age, disability, sex, sexual orientation, or gender identity.            Thank you!     Thank you for choosing [facility]  for your care. Our goal is always to provide you with excellent care. Hearing back from our patients is one way we can continue to improve our services. Please take a few minutes to complete the written survey that you may receive in the mail after your visit with us. Thank you!             Your Updated Medication List - Protect others around you: Learn how to safely use, store and throw away your medicines at www.disposemymeds.org.          This list is accurate as of 11/29/18  3:39 PM.  Always use your most recent med list.                   Brand Name Dispense Instructions for use Diagnosis    fluticasone 50 MCG/ACT nasal spray    FLONASE    1 Bottle    Spray 1 spray into both nostrils daily        ibuprofen 600 MG tablet    ADVIL/MOTRIN    60 tablet    Take 1 tablet (600 mg) by mouth 2 times daily (with meals)    Medial epicondylitis of elbow, right       valACYclovir 1000 mg tablet    VALTREX    12 tablet    Take 2 tablets (2,000 mg) by mouth 2 times daily    Cold sore

## 2018-11-29 NOTE — PROGRESS NOTES
SUBJECTIVE:   58 year old male complains of right medial epicondylar pain for many months. Occurs while lifting or gripping or otherwise using the extremity. Precipitating factors: no direct injury, but uses arm a lot for weight lifting. Stopped everything and better but not gone.    OBJECTIVE:  He appears well with normal vitals signs as noted.  Point tenderness over right medial epicondyle with crepitation on extension with positive Cozen's test. Elbow, wrist and hand otherwise normal.    ASSESSMENT:  Medial epicondylitis    PLAN:  This condition has been fully explained to the patient, who indicates understanding.   Treatment plan:Rehab stretches/ exercises to begin immediately and given in writing with illustrations.   rest the arm as much as practical;  use of heat and/or ice may be helpful prn; NSAID's given as trial today; consider steroid injection. If not improved call as needed.

## 2019-04-24 ENCOUNTER — OFFICE VISIT (OUTPATIENT)
Dept: FAMILY MEDICINE | Facility: CLINIC | Age: 59
End: 2019-04-24

## 2019-04-24 VITALS
HEART RATE: 60 BPM | WEIGHT: 185.8 LBS | DIASTOLIC BLOOD PRESSURE: 80 MMHG | OXYGEN SATURATION: 96 % | SYSTOLIC BLOOD PRESSURE: 118 MMHG | BODY MASS INDEX: 29.1 KG/M2 | TEMPERATURE: 97.9 F

## 2019-04-24 DIAGNOSIS — H57.89 IRRITATION OF LEFT EYE: ICD-10-CM

## 2019-04-24 DIAGNOSIS — H00.012 HORDEOLUM EXTERNUM OF RIGHT LOWER EYELID: Primary | ICD-10-CM

## 2019-04-24 DIAGNOSIS — N52.9 ERECTILE DYSFUNCTION, UNSPECIFIED ERECTILE DYSFUNCTION TYPE: ICD-10-CM

## 2019-04-24 PROCEDURE — 99213 OFFICE O/P EST LOW 20 MIN: CPT | Performed by: FAMILY MEDICINE

## 2019-04-24 RX ORDER — SILDENAFIL 100 MG/1
100 TABLET, FILM COATED ORAL DAILY PRN
Qty: 30 TABLET | Refills: 1 | Status: SHIPPED | OUTPATIENT
Start: 2019-04-24 | End: 2020-11-23

## 2019-04-24 NOTE — PROGRESS NOTES
"S:Benedict De Anda is a 58 year old male who complains of swelling of the lids  both eyes for 1 month. The left lower lid had \"stye\" first which then drained and then right developed lower stye.  He states the left lower lid still feels like something is in there.    NO persistent drainage, no fevers, no loss of vision, no tearing  The patient does not wear glasses/contacts. No history of eye trauma, chemical exposure, or severe pain. No loss of vision.    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 Father         bypass     Hypertension Father      Lipids Father      Social History     Socioeconomic History     Marital status:      Spouse name: Not on file     Number of children: 3     Years of education: Not on file     Highest education level: Not on file   Occupational History     Not on file   Social Needs     Financial resource strain: Not on file     Food insecurity:     Worry: Not on file     Inability: Not on file     Transportation needs:     Medical: Not on file     Non-medical: Not on file   Tobacco Use     Smoking status: Former Smoker     Smokeless tobacco: Never Used     Tobacco comment: stopped age 26   Substance and Sexual Activity     Alcohol use: Yes     Alcohol/week: 6.0 oz     Types: 10 Standard drinks or equivalent per week     Drug use: Not on file     Sexual activity: Yes     Partners: Female     Birth control/protection: Surgical, Post-menopausal     Comment: hysterectomy   Lifestyle     Physical activity: "     Days per week: Not on file     Minutes per session: Not on file     Stress: Not on file   Relationships     Social connections:     Talks on phone: Not on file     Gets together: Not on file     Attends Restoration service: Not on file     Active member of club or organization: Not on file     Attends meetings of clubs or organizations: Not on file     Relationship status: Not on file     Intimate partner violence:     Fear of current or ex partner: Not on file     Emotionally abused: Not on file     Physically abused: Not on file     Forced sexual activity: Not on file   Other Topics Concern      Service No     Blood Transfusions No     Caffeine Concern No     Comment: coffee 2-3 cups     Occupational Exposure No     Hobby Hazards No     Sleep Concern No     Stress Concern No     Weight Concern No     Special Diet No     Back Care No     Exercise Yes     Comment: club member     Bike Helmet Not Asked     Seat Belt Yes     Self-Exams Yes   Social History Narrative     Not on file     Past Surgical History:   Procedure Laterality Date     HC KNEE SCOPE, W/LATERAL RELEASE  1992    knee spur       Current Outpatient Medications on File Prior to Visit:  ibuprofen (ADVIL/MOTRIN) 600 MG tablet Take 1 tablet (600 mg) by mouth 2 times daily (with meals)   valACYclovir (VALTREX) 1000 mg tablet Take 2 tablets (2,000 mg) by mouth 2 times daily     No current facility-administered medications on file prior to visit.      Allergies: Amoxicillin    Immunization History   Administered Date(s) Administered     Influenza Vaccine IM 3yrs+ 4 Valent IIV4 09/30/2015, 10/11/2017, 09/19/2018     TDAP Vaccine (Boostrix) 08/19/2009        O:/80 (BP Location: Left arm, Patient Position: Sitting, Cuff Size: Adult Large)   Pulse 60   Temp 97.9  F (36.6  C) (Oral)   Wt 84.3 kg (185 lb 12.8 oz)   SpO2 96%   BMI 29.10 kg/m    Acuity OD: normal  OS: normal  OU: normal  Exam: right eye abnormal findings: lower external stye,  left eye abnormal findings: erythema lower internal lid, no stye, no abrasions  Flourescein staining: negative  A:External Hordeolum right not resolving with compresses, previous stye left with residual symptoms -no clear etiology    P:ophthalmology eval recommended this week-he will call and set up , referralentered

## 2019-04-24 NOTE — NURSING NOTE
Martín is here for bilateral eye styes.    Pre-visit Screening:  Immunizations:  up to date  Colonoscopy:  is up to date  Mammogram: NA  Asthma Action Test/Plan:  NA  PHQ9:  None  GAD7:  None  Questioned patient about current smoking habits Pt. quit smoking some time ago.  Ok to leave detailed message on voice mail for today's visit only Yes, phone # [phone]

## 2019-12-08 ENCOUNTER — HEALTH MAINTENANCE LETTER (OUTPATIENT)
Age: 59
End: 2019-12-08

## 2020-11-11 ENCOUNTER — MYC REFILL (OUTPATIENT)
Dept: FAMILY MEDICINE | Facility: CLINIC | Age: 60
End: 2020-11-11

## 2020-11-11 DIAGNOSIS — N52.9 ERECTILE DYSFUNCTION, UNSPECIFIED ERECTILE DYSFUNCTION TYPE: ICD-10-CM

## 2020-11-11 RX ORDER — SILDENAFIL 100 MG/1
100 TABLET, FILM COATED ORAL DAILY PRN
Qty: 30 TABLET | Refills: 1 | Status: CANCELLED | OUTPATIENT
Start: 2020-11-11

## 2020-11-11 NOTE — TELEPHONE ENCOUNTER
Patient is requesting a refill of   Pending Prescriptions:                       Disp   Refills    sildenafil (VIAGRA) 100 MG tablet         30 tab*1            Sig: Take 1 tablet (100 mg) by mouth daily as needed      Patient has not been seen here in over a year so this is being denied.

## 2020-11-23 ENCOUNTER — OFFICE VISIT (OUTPATIENT)
Dept: FAMILY MEDICINE | Facility: CLINIC | Age: 60
End: 2020-11-23

## 2020-11-23 VITALS
TEMPERATURE: 98.2 F | HEART RATE: 80 BPM | WEIGHT: 186.8 LBS | BODY MASS INDEX: 29.32 KG/M2 | SYSTOLIC BLOOD PRESSURE: 124 MMHG | OXYGEN SATURATION: 96 % | DIASTOLIC BLOOD PRESSURE: 80 MMHG | HEIGHT: 67 IN

## 2020-11-23 DIAGNOSIS — B00.1 RECURRENT COLD SORES: Primary | ICD-10-CM

## 2020-11-23 DIAGNOSIS — N52.9 ERECTILE DYSFUNCTION, UNSPECIFIED ERECTILE DYSFUNCTION TYPE: ICD-10-CM

## 2020-11-23 PROCEDURE — 99213 OFFICE O/P EST LOW 20 MIN: CPT | Performed by: FAMILY MEDICINE

## 2020-11-23 RX ORDER — VALACYCLOVIR HYDROCHLORIDE 1 G/1
2000 TABLET, FILM COATED ORAL 2 TIMES DAILY
Qty: 12 TABLET | Refills: 3 | Status: CANCELLED | OUTPATIENT
Start: 2020-11-23

## 2020-11-23 RX ORDER — SILDENAFIL 100 MG/1
100 TABLET, FILM COATED ORAL DAILY PRN
Qty: 30 TABLET | Refills: 1 | Status: SHIPPED | OUTPATIENT
Start: 2020-11-23 | End: 2021-11-18

## 2020-11-23 RX ORDER — VALACYCLOVIR HYDROCHLORIDE 500 MG/1
500 TABLET, FILM COATED ORAL DAILY
Qty: 90 TABLET | Refills: 3 | Status: SHIPPED | OUTPATIENT
Start: 2020-11-23 | End: 2021-11-18

## 2020-11-23 RX ORDER — SILDENAFIL 100 MG/1
100 TABLET, FILM COATED ORAL DAILY PRN
Qty: 30 TABLET | Refills: 1 | Status: SHIPPED | OUTPATIENT
Start: 2020-11-23 | End: 2020-11-23

## 2020-11-23 ASSESSMENT — MIFFLIN-ST. JEOR: SCORE: 1620.95

## 2020-11-23 NOTE — PROGRESS NOTES
"SUBJECTIVE:  Benedict De Anda, a 60 year old male scheduled an appointment to discuss the following issues:     Recurrent cold sores  Erectile dysfunction, unspecified erectile dysfunction type  Pt getting painful cold sores every month, lasting a week despite valtrex dosing on infection 2 g BID, has used suppressive therapy in past through dermatology    Pt also requests refill viagra    Medical, social, surgical, and family histories reviewed.    ROS:  CONSTITUTIONAL: NEGATIVE for fever, chills  : NEGATIVE for frequency, dysuria, or hematuria  MUSCULOSKELETAL: NEGATIVE for significant arthralgias or myalgia    OBJECTIVE:  /80 (BP Location: Left arm, Patient Position: Sitting, Cuff Size: Adult Large)   Pulse 80   Temp 98.2  F (36.8  C) (Oral)   Ht 1.702 m (5' 7\")   Wt 84.7 kg (186 lb 12.8 oz)   SpO2 96%   BMI 29.26 kg/m    EXAM:  GENERAL APPEARANCE: healthy, alert and no distress  EYES: EOMI,  PERRL  RESP: lungs clear to auscultation - no rales, rhonchi or wheezes  CV: regular rates and rhythm, normal S1 S2, no S3 or S4 and no murmur, click or rub -  ABDOMEN:  soft, nontender, no HSM or masses and bowel sounds normal  SKIN: cold sore left perioral    ASSESSMENT/PLAN:  (B00.1) Recurrent cold sores  (primary encounter diagnosis)  Comment: discussed options- given frequent flares feel suppressive therapy best  Plan: valtrex, I reviewed the risks, benefits, and possible side effects of the medication.  The patient had an opportunity to ask any questions regarding the treatment plan. The patient was encouraged to call my office if any problems.     (N52.9) Erectile dysfunction, unspecified erectile dysfunction type  Comment: stable symptomatically   Plan: sildenafil (VIAGRA) 100 MG tablet        continue current medications at current doses      "

## 2020-11-23 NOTE — TELEPHONE ENCOUNTER
Pt wife called asking if the Viagra RX could be sent to [facility]. Had OV today.    Pending Prescriptions:                       Disp   Refills    sildenafil (VIAGRA) 100 MG tablet         30 tab*1            Sig: Take 1 tablet (100 mg) by mouth daily as needed

## 2020-11-23 NOTE — NURSING NOTE
Martín is here for med check and discuss valtrex    Pre-visit Screening:  Immunizations:  up to date  Colonoscopy:  is up to date  Mammogram: NA  Asthma Action Test/Plan:  MARIANO  PHQ9:  NA  GAD7:  NA  Questioned patient about current smoking habits Pt. quit smoking some time ago.  Ok to leave detailed message on voice mail for today's visit only Yes, phone # [phone]

## 2020-11-24 ENCOUNTER — ALLIED HEALTH/NURSE VISIT (OUTPATIENT)
Dept: FAMILY MEDICINE | Facility: CLINIC | Age: 60
End: 2020-11-24

## 2020-11-24 DIAGNOSIS — Z23 NEED FOR VACCINATION: Primary | ICD-10-CM

## 2020-11-24 PROCEDURE — 90750 HZV VACC RECOMBINANT IM: CPT | Performed by: FAMILY MEDICINE

## 2020-11-24 PROCEDURE — 90471 IMMUNIZATION ADMIN: CPT | Performed by: FAMILY MEDICINE

## 2021-01-09 ENCOUNTER — HEALTH MAINTENANCE LETTER (OUTPATIENT)
Age: 61
End: 2021-01-09

## 2021-01-25 ENCOUNTER — ALLIED HEALTH/NURSE VISIT (OUTPATIENT)
Dept: FAMILY MEDICINE | Facility: CLINIC | Age: 61
End: 2021-01-25

## 2021-01-25 DIAGNOSIS — Z23 NEED FOR VACCINATION: Primary | ICD-10-CM

## 2021-01-25 PROCEDURE — 90750 HZV VACC RECOMBINANT IM: CPT | Performed by: FAMILY MEDICINE

## 2021-01-25 PROCEDURE — 90471 IMMUNIZATION ADMIN: CPT | Performed by: FAMILY MEDICINE

## 2021-10-23 ENCOUNTER — HEALTH MAINTENANCE LETTER (OUTPATIENT)
Age: 61
End: 2021-10-23

## 2021-11-10 DIAGNOSIS — B00.1 RECURRENT COLD SORES: ICD-10-CM

## 2021-11-11 RX ORDER — VALACYCLOVIR HYDROCHLORIDE 500 MG/1
TABLET, FILM COATED ORAL
Qty: 90 TABLET | Refills: 3 | COMMUNITY
Start: 2021-11-11

## 2021-11-11 NOTE — TELEPHONE ENCOUNTER
Benedict De Anda is requesting a refill of:    Refused Prescriptions:                       Disp   Refills    valACYclovir (VALTREX) 500 MG tablet [Phar*90 tab*3        Sig: TAKE 1 TABLET(500 MG) BY MOUTH DAILY  Refused By: URIEL SHIELDS  Reason for Refusal: Patient never under provider care  Reason for Refusal Comment: Needs OV for refills

## 2021-11-18 ENCOUNTER — OFFICE VISIT (OUTPATIENT)
Dept: FAMILY MEDICINE | Facility: CLINIC | Age: 61
End: 2021-11-18

## 2021-11-18 VITALS
DIASTOLIC BLOOD PRESSURE: 78 MMHG | TEMPERATURE: 97.4 F | WEIGHT: 183 LBS | BODY MASS INDEX: 28.72 KG/M2 | HEART RATE: 60 BPM | RESPIRATION RATE: 20 BRPM | SYSTOLIC BLOOD PRESSURE: 138 MMHG | HEIGHT: 67 IN

## 2021-11-18 DIAGNOSIS — N52.9 ERECTILE DYSFUNCTION, UNSPECIFIED ERECTILE DYSFUNCTION TYPE: ICD-10-CM

## 2021-11-18 DIAGNOSIS — Z23 NEED FOR TETANUS BOOSTER: ICD-10-CM

## 2021-11-18 DIAGNOSIS — Z71.89 ACP (ADVANCE CARE PLANNING): ICD-10-CM

## 2021-11-18 DIAGNOSIS — B00.1 RECURRENT COLD SORES: Primary | ICD-10-CM

## 2021-11-18 PROCEDURE — 90471 IMMUNIZATION ADMIN: CPT | Performed by: FAMILY MEDICINE

## 2021-11-18 PROCEDURE — 99213 OFFICE O/P EST LOW 20 MIN: CPT | Mod: 25 | Performed by: FAMILY MEDICINE

## 2021-11-18 PROCEDURE — 90714 TD VACC NO PRESV 7 YRS+ IM: CPT | Performed by: FAMILY MEDICINE

## 2021-11-18 RX ORDER — VALACYCLOVIR HYDROCHLORIDE 500 MG/1
500 TABLET, FILM COATED ORAL DAILY
Qty: 90 TABLET | Refills: 3 | Status: SHIPPED | OUTPATIENT
Start: 2021-11-18 | End: 2022-10-19

## 2021-11-18 RX ORDER — SILDENAFIL 100 MG/1
100 TABLET, FILM COATED ORAL DAILY PRN
Qty: 30 TABLET | Refills: 1 | Status: SHIPPED | OUTPATIENT
Start: 2021-11-18 | End: 2022-10-19

## 2021-11-18 ASSESSMENT — MIFFLIN-ST. JEOR: SCORE: 1598.71

## 2021-11-18 NOTE — PROGRESS NOTES
"  Assessment & Plan     Recurrent cold sores  Well controlled, continue current medications at current doses   - valACYclovir (VALTREX) 500 MG tablet  Dispense: 90 tablet; Refill: 3    Erectile dysfunction, unspecified erectile dysfunction type  Well controlled, continue current medications at current doses   - sildenafil (VIAGRA) 100 MG tablet  Dispense: 30 tablet; Refill: 1    Need for tetanus booster        Prescription drug management         BMI:   Estimated body mass index is 28.66 kg/m  as calculated from the following:    Height as of this encounter: 1.702 m (5' 7\").    Weight as of this encounter: 83 kg (183 lb).       FUTURE APPOINTMENTS:  Regular exercise    Recheck 1 yr    No follow-ups on file.    Redd Tejada MD  [facility]    Bonnie Resendiz is a 61 year old who presents for the following health issues     HPI     Cold Sores- pt very happy with daily use valtrex, no flares all year, no med side effects     ED- well controlled, viagra working well, .no med side effects         How many servings of fruits and vegetables do you eat daily?  2-3    On average, how many sweetened beverages do you drink each day (Examples: soda, juice, sweet tea, etc.  Do NOT count diet or artificially sweetened beverages)?   1    How many days per week do you exercise enough to make your heart beat faster? 5    How many minutes a day do you exercise enough to make your heart beat faster? 30 - 60    How many days per week do you miss taking your medication? 0        Review of Systems   Constitutional, HEENT, cardiovascular, pulmonary, gi and gu systems are negative, except as otherwise noted.      Objective    /78 (BP Location: Left arm, Patient Position: Chair, Cuff Size: Adult Regular)   Pulse 60   Temp 97.4  F (36.3  C)   Resp 20   Ht 1.702 m (5' 7\")   Wt 83 kg (183 lb)   BMI 28.66 kg/m    Body mass index is 28.66 kg/m .  Physical Exam   GENERAL: healthy, alert and no " distress  NECK: no adenopathy, no asymmetry, masses, or scars and thyroid normal to palpation  RESP: lungs clear to auscultation - no rales, rhonchi or wheezes  CV: regular rate and rhythm, normal S1 S2, no S3 or S4, no murmur, click or rub, no peripheral edema and peripheral pulses strong  ABDOMEN: soft, nontender, no hepatosplenomegaly, no masses and bowel sounds normal  MS: no gross musculoskeletal defects noted, no edema  SKIN: no suspicious lesions or rashes

## 2021-11-18 NOTE — NURSING NOTE
Benedict De Anda is here for a med check and refill    Questioned patient about current smoking habits.  Pt. has never smoked.  PULSE regular  My Chart: active  CLASSIFICATION OF OVERWEIGHT AND OBESITY BY BMI                        Obesity Class           BMI(kg/m2)  Underweight                                    < 18.5  Normal                                         18.5-24.9  Overweight                                     25.0-29.9  OBESITY                     I                  30.0-34.9                             II                 35.0-39.9  EXTREME OBESITY             III                >40                            Patient's  BMI Body mass index is 28.66 kg/m .  http://hin.nhlbi.nih.gov/menuplanner/menu.cgi  Pre-visit planning  Immunizations - up to date  Colonoscopy - is up to date  Mammogram -   Asthma -   PHQ9 -    GIA-7 -    Hearing Test -

## 2022-02-12 ENCOUNTER — HEALTH MAINTENANCE LETTER (OUTPATIENT)
Age: 62
End: 2022-02-12

## 2022-10-09 ENCOUNTER — HEALTH MAINTENANCE LETTER (OUTPATIENT)
Age: 62
End: 2022-10-09

## 2022-10-19 ENCOUNTER — OFFICE VISIT (OUTPATIENT)
Dept: FAMILY MEDICINE | Facility: CLINIC | Age: 62
End: 2022-10-19

## 2022-10-19 VITALS
BODY MASS INDEX: 27.49 KG/M2 | HEART RATE: 62 BPM | DIASTOLIC BLOOD PRESSURE: 68 MMHG | OXYGEN SATURATION: 98 % | SYSTOLIC BLOOD PRESSURE: 126 MMHG | WEIGHT: 181.4 LBS | HEIGHT: 68 IN | TEMPERATURE: 97 F

## 2022-10-19 DIAGNOSIS — N52.9 ERECTILE DYSFUNCTION, UNSPECIFIED ERECTILE DYSFUNCTION TYPE: ICD-10-CM

## 2022-10-19 DIAGNOSIS — M54.9 DISCOMFORT OF BACK: Primary | ICD-10-CM

## 2022-10-19 DIAGNOSIS — B00.1 RECURRENT COLD SORES: ICD-10-CM

## 2022-10-19 PROCEDURE — 90471 IMMUNIZATION ADMIN: CPT | Performed by: STUDENT IN AN ORGANIZED HEALTH CARE EDUCATION/TRAINING PROGRAM

## 2022-10-19 PROCEDURE — 90686 IIV4 VACC NO PRSV 0.5 ML IM: CPT | Performed by: STUDENT IN AN ORGANIZED HEALTH CARE EDUCATION/TRAINING PROGRAM

## 2022-10-19 PROCEDURE — 99213 OFFICE O/P EST LOW 20 MIN: CPT | Mod: 25 | Performed by: STUDENT IN AN ORGANIZED HEALTH CARE EDUCATION/TRAINING PROGRAM

## 2022-10-19 RX ORDER — SILDENAFIL 100 MG/1
100 TABLET, FILM COATED ORAL DAILY PRN
Qty: 30 TABLET | Refills: 3 | Status: SHIPPED | OUTPATIENT
Start: 2022-10-19 | End: 2023-09-19

## 2022-10-19 RX ORDER — VALACYCLOVIR HYDROCHLORIDE 500 MG/1
500 TABLET, FILM COATED ORAL DAILY
Qty: 90 TABLET | Refills: 3 | Status: SHIPPED | OUTPATIENT
Start: 2022-10-19 | End: 2023-10-03

## 2022-10-19 NOTE — PATIENT INSTRUCTIONS
Refills sent     Try lidocaine patch or capsaicin cream on your back. Let me know if worsening.     Call with any other questions or concerns

## 2022-10-19 NOTE — PROGRESS NOTES
"Assessment & Plan       ICD-10-CM    1. Discomfort of back  M54.9       2. Erectile dysfunction, unspecified erectile dysfunction type  N52.9 sildenafil (VIAGRA) 100 MG tablet      3. Recurrent cold sores  B00.1 valACYclovir (VALTREX) 500 MG tablet           Patient Instructions   Refills sent     Try lidocaine patch or capsaicin cream on your back. Let me know if worsening.     Call with any other questions or concerns     >20 minutes spent on the date of the encounter doing chart review, history and exam, documentation and further activities per the note    Reno Jonas MD, [facility]       Subjective     Benedict De Anda is a 61 year old male who presents to clinic today for the following health issues:    HPI   Chief Complaint   Patient presents with     Recheck Medication     Pt is here for a med check.  ED: stable, no side effects. No change in other meds.   Recurrent cold sores: stable, tolerates valtrex well, no new concerns or outbreaks.      Musculoskeletal Problem     Pt c/o a tingling sensation in the middle of the back. Intermittently goes away. Not painful, just bothersome. No rash. No injury. No LE sx.             Objective    /68 (BP Location: Right arm, Patient Position: Sitting, Cuff Size: Adult Large)   Pulse 62   Temp 97  F (36.1  C) (Temporal)   Ht 1.727 m (5' 8\")   Wt 82.3 kg (181 lb 6.4 oz)   SpO2 98%   BMI 27.58 kg/m    Body mass index is 27.58 kg/m .  Alert, NAD  NC/AT  Sclerae anicteric  RRR  Resp nonlabored  Skin warm and dry  No focal neuro deficits. Speech intact.   Appropriate affect  Lumbar spine normal appearance, full ROM, full str and sens BLE. Normal gait.    Labs reviewed       "

## 2022-10-19 NOTE — NURSING NOTE
Chief Complaint   Patient presents with     Recheck Medication     Pt is here for a med check.     Musculoskeletal Problem     Pt c/o a tingling sensation in the middle of the back. Not painful, just bothersome.     Pre-visit Screening:  Immunizations:  Flu done today.  Colonoscopy: up to date  Mammogram: na  Asthma Action Test/Plan:  na  PHQ9:  na  GAD7:  na  Questioned patient about current smoking habits Pt. has never smoked.  Ok to leave detailed message on voice mail for today's visit only yes, phone # [phone]

## 2023-03-25 ENCOUNTER — HEALTH MAINTENANCE LETTER (OUTPATIENT)
Age: 63
End: 2023-03-25

## 2023-09-18 DIAGNOSIS — N52.9 ERECTILE DYSFUNCTION, UNSPECIFIED ERECTILE DYSFUNCTION TYPE: ICD-10-CM

## 2023-09-19 RX ORDER — SILDENAFIL 100 MG/1
100 TABLET, FILM COATED ORAL DAILY PRN
Qty: 30 TABLET | Refills: 0 | Status: SHIPPED | OUTPATIENT
Start: 2023-09-19 | End: 2023-11-13

## 2023-09-19 NOTE — TELEPHONE ENCOUNTER
Pt last seen 10/19/22 due for yearly OV next month.    Benedict De Anda is requesting a refill of:    Pending Prescriptions:                       Disp   Refills    sildenafil (VIAGRA) 100 MG tablet [Pharma*30 tab*0            Sig: TAKE ONE TABLET BY MOUTH DAILY AS NEEDED

## 2023-10-03 ENCOUNTER — MYC MEDICAL ADVICE (OUTPATIENT)
Dept: FAMILY MEDICINE | Facility: CLINIC | Age: 63
End: 2023-10-03

## 2023-10-03 DIAGNOSIS — B00.1 RECURRENT COLD SORES: ICD-10-CM

## 2023-10-04 RX ORDER — VALACYCLOVIR HYDROCHLORIDE 500 MG/1
500 TABLET, FILM COATED ORAL DAILY
Qty: 30 TABLET | Refills: 0 | Status: SHIPPED | OUTPATIENT
Start: 2023-10-04 | End: 2023-11-13

## 2023-10-04 RX ORDER — VALACYCLOVIR HYDROCHLORIDE 500 MG/1
500 TABLET, FILM COATED ORAL DAILY
Qty: 90 TABLET | Refills: 3 | COMMUNITY
Start: 2023-10-04

## 2023-10-04 NOTE — TELEPHONE ENCOUNTER
Pt due for OV. Pending 1 month supply.    Benedict De Anda is requesting a refill of:    Pending Prescriptions:                       Disp   Refills    valACYclovir (VALTREX) 500 MG tablet      30 tab*0            Sig: Take 1 tablet (500 mg) by mouth daily

## 2023-10-04 NOTE — TELEPHONE ENCOUNTER
Benedict De Anda is requesting a refill of:    Refused Prescriptions:                       Disp   Refills    valACYclovir (VALTREX) 500 MG tablet [Phar*90 tab*3        Sig: TAKE 1 TABLET(500 MG) BY MOUTH DAILY  Refused By: NALLELY LOPEZ  Reason for Refusal: Duplicate    See other encounter.

## 2023-11-13 ENCOUNTER — OFFICE VISIT (OUTPATIENT)
Dept: FAMILY MEDICINE | Facility: CLINIC | Age: 63
End: 2023-11-13

## 2023-11-13 VITALS
HEIGHT: 67 IN | SYSTOLIC BLOOD PRESSURE: 132 MMHG | WEIGHT: 181.6 LBS | HEART RATE: 60 BPM | DIASTOLIC BLOOD PRESSURE: 80 MMHG | TEMPERATURE: 97.4 F | BODY MASS INDEX: 28.5 KG/M2 | RESPIRATION RATE: 20 BRPM

## 2023-11-13 DIAGNOSIS — Z12.5 SPECIAL SCREENING FOR MALIGNANT NEOPLASM OF PROSTATE: ICD-10-CM

## 2023-11-13 DIAGNOSIS — Z23 NEED FOR VACCINATION: ICD-10-CM

## 2023-11-13 DIAGNOSIS — Z00.00 ROUTINE GENERAL MEDICAL EXAMINATION AT A HEALTH CARE FACILITY: Primary | ICD-10-CM

## 2023-11-13 DIAGNOSIS — N52.9 ERECTILE DYSFUNCTION, UNSPECIFIED ERECTILE DYSFUNCTION TYPE: ICD-10-CM

## 2023-11-13 DIAGNOSIS — L57.0 ACTINIC KERATOSIS: ICD-10-CM

## 2023-11-13 DIAGNOSIS — Z13.220 SCREENING FOR LIPOID DISORDERS: ICD-10-CM

## 2023-11-13 DIAGNOSIS — B00.1 RECURRENT COLD SORES: ICD-10-CM

## 2023-11-13 DIAGNOSIS — Z13.1 SCREENING FOR DIABETES MELLITUS: ICD-10-CM

## 2023-11-13 LAB
ALBUMIN SERPL-MCNC: 4.5 G/DL (ref 3.6–5.1)
ALBUMIN/GLOB SERPL: 2 {RATIO} (ref 1–2.5)
ALP SERPL-CCNC: 65 U/L (ref 33–130)
ALT 1742-6: 19 U/L (ref 0–32)
AST 1920-8: 15 U/L (ref 0–35)
BILIRUB SERPL-MCNC: 1.1 MG/DL (ref 0.2–1.2)
BUN SERPL-MCNC: 23 MG/DL (ref 7–25)
BUN/CREATININE RATIO: 20.7 (ref 6–32)
CALCIUM SERPL-MCNC: 9.2 MG/DL (ref 8.6–10.3)
CHLORIDE SERPLBLD-SCNC: 106.8 MMOL/L (ref 98–110)
CHOLEST SERPL-MCNC: 136 MG/DL (ref 0–199)
CHOLEST/HDLC SERPL: 2 {RATIO} (ref 0–5)
CO2 SERPL-SCNC: 27.2 MMOL/L (ref 20–32)
CREAT SERPL-MCNC: 1.11 MG/DL (ref 0.6–1.3)
GLOBULIN, CALCULATED - QUEST: 2.3 (ref 1.9–3.7)
GLUCOSE SERPL-MCNC: 110 MG/DL (ref 60–99)
HDLC SERPL-MCNC: 61 MG/DL (ref 40–150)
LDLC SERPL CALC-MCNC: 67 MG/DL (ref 0–130)
POTASSIUM SERPL-SCNC: 4.9 MMOL/L (ref 3.5–5.3)
PROT SERPL-MCNC: 6.8 G/DL (ref 6.1–8.1)
SODIUM SERPL-SCNC: 141.5 MMOL/L (ref 135–146)
TRIGL SERPL-MCNC: 41 MG/DL (ref 0–149)

## 2023-11-13 PROCEDURE — 99396 PREV VISIT EST AGE 40-64: CPT | Mod: 25 | Performed by: FAMILY MEDICINE

## 2023-11-13 PROCEDURE — 36415 COLL VENOUS BLD VENIPUNCTURE: CPT | Performed by: FAMILY MEDICINE

## 2023-11-13 PROCEDURE — 17110 DESTRUCTION B9 LES UP TO 14: CPT | Performed by: FAMILY MEDICINE

## 2023-11-13 PROCEDURE — 90686 IIV4 VACC NO PRSV 0.5 ML IM: CPT | Performed by: FAMILY MEDICINE

## 2023-11-13 PROCEDURE — 90471 IMMUNIZATION ADMIN: CPT | Performed by: FAMILY MEDICINE

## 2023-11-13 PROCEDURE — 84153 ASSAY OF PSA TOTAL: CPT | Mod: 90 | Performed by: FAMILY MEDICINE

## 2023-11-13 PROCEDURE — 80053 COMPREHEN METABOLIC PANEL: CPT | Performed by: FAMILY MEDICINE

## 2023-11-13 PROCEDURE — 80061 LIPID PANEL: CPT | Performed by: FAMILY MEDICINE

## 2023-11-13 RX ORDER — SILDENAFIL 100 MG/1
100 TABLET, FILM COATED ORAL DAILY PRN
Qty: 30 TABLET | Refills: 0 | Status: SHIPPED | OUTPATIENT
Start: 2023-11-13

## 2023-11-13 RX ORDER — VALACYCLOVIR HYDROCHLORIDE 500 MG/1
500 TABLET, FILM COATED ORAL DAILY
Qty: 90 TABLET | Refills: 3 | Status: SHIPPED | OUTPATIENT
Start: 2023-11-13

## 2023-11-13 NOTE — PROGRESS NOTES
3  SUBJECTIVE:   CC: Benedict De Anda is an 62 year old male who presents for preventive health visit.       Patient has been advised of split billing requirements and indicates understanding: Yes  Healthy Habits:  Do you get at least three servings of calcium containing foods daily (dairy, green leafy vegetables, etc.)? yes  Amount of exercise or daily activities, outside of work: 4 day(s) per week  Problems taking medications regularly No  Medication side effects: No  Have you had an eye exam in the past two years? yes  Do you see a dentist twice per year? yes  Do you have sleep apnea, excessive snoring or daytime drowsiness?no          Today's PHQ-2 Score:       11/13/2023    11:05 AM 10/19/2022    12:41 PM   PHQ-2 ( 1999 Pfizer)   Q1: Little interest or pleasure in doing things 0 0   Q2: Feeling down, depressed or hopeless 0 0   PHQ-2 Score 0 0       Abuse: Current or Past(Physical, Sexual or Emotional)- No  Do you feel safe in your environment?         Social History     Tobacco Use    Smoking status: Former    Smokeless tobacco: Never    Tobacco comments:     stopped age 26   Substance Use Topics    Alcohol use: Yes     Alcohol/week: 10.0 standard drinks of alcohol     Types: 10 Standard drinks or equivalent per week     If you drink alcohol do you typically have >3 drinks per day or >7 drinks per week? No                      Last PSA:   Abbott PSA   Date Value Ref Range Status   02/22/2018 0.8 < OR = 4.0 ng/mL Final     Comment:     The total PSA value from this assay system is   standardized against the WHO standard. The test   result will be approximately 20% lower when compared   to the equimolar-standardized total PSA (Rodrigo   Stephen). Comparison of serial PSA results should be   interpreted with this fact in mind.     This test was performed using the Siemens   chemiluminescent method. Values obtained from   different assay methods cannot be used  interchangeably. PSA levels, regardless of  value,  should not be interpreted as absolute  evidence of the presence or absence of disease.         Reviewed orders with patient. Reviewed health maintenance and updated orders accordingly - Yes  BP Readings from Last 3 Encounters:   11/13/23 132/80   10/19/22 126/68   11/18/21 138/78    Wt Readings from Last 3 Encounters:   11/13/23 82.4 kg (181 lb 9.6 oz)   10/19/22 82.3 kg (181 lb 6.4 oz)   11/18/21 83 kg (183 lb)                  Patient Active Problem List   Diagnosis    Herpes simplex virus (HSV) infection    Allergic rhinitis    ED (erectile dysfunction)    Health Care Home    ACP (advance care planning)     Past Surgical History:   Procedure Laterality Date    HC KNEE SCOPE, W/LATERAL RELEASE  1992    knee spur       Social History     Tobacco Use    Smoking status: Former    Smokeless tobacco: Never    Tobacco comments:     stopped age 26   Substance Use Topics    Alcohol use: Yes     Alcohol/week: 4.0 standard drinks of alcohol     Types: 4 Standard drinks or equivalent per week     Family History   Problem Relation Age of Onset    C.A.D. Father         bypass    Hypertension Father     Lipids Father     Diabetes No family hx of          Current Outpatient Medications   Medication Sig Dispense Refill    valACYclovir (VALTREX) 500 MG tablet Take 1 tablet (500 mg) by mouth daily 90 tablet 3    sildenafil (VIAGRA) 100 MG tablet TAKE ONE TABLET BY MOUTH DAILY AS NEEDED 30 tablet 0     Allergies   Allergen Reactions    Amoxicillin Hives     Recent Labs   Lab Test 02/22/18  1348   LDL 73   HDL 51   TRIG 46   ALT 62*   CR 1.11   GFRESTIMATED 74   POTASSIUM 4.4        Reviewed and updated as needed this visit by clinical staff   Tobacco  Allergies  Meds              Reviewed and updated as needed this visit by Provider                 Past Medical History:   Diagnosis Date    Allergic rhinitis, cause unspecified 8/22/2005    testing 2006    Herpes simplex without mention of complication 8/22/2005      Past Surgical  "History:   Procedure Laterality Date     KNEE SCOPE, W/LATERAL RELEASE  1992    knee spur       ROS:  CONSTITUTIONAL: NEGATIVE for fever, chills, change in weight  INTEGUMENTARY/SKIN: crust on right forearm- derm froze this years ago and it resolved but came back- wants frozen again  EYES: NEGATIVE for vision changes or irritation  ENT: NEGATIVE for ear, mouth and throat problems  RESP: NEGATIVE for significant cough or SOB  CV: NEGATIVE for chest pain, palpitations or peripheral edema  GI: NEGATIVE for nausea, abdominal pain, heartburn, or change in bowel habits   male: negative for dysuria, hematuria, decreased urinary stream, erectile dysfunction, urethral discharge  MUSCULOSKELETAL: NEGATIVE for significant arthralgias or myalgia  NEURO: NEGATIVE for weakness, dizziness or paresthesias  PSYCHIATRIC: NEGATIVE for changes in mood or affect    OBJECTIVE:   /80 (BP Location: Left arm, Patient Position: Chair, Cuff Size: Adult Regular)   Pulse 60   Temp 97.4  F (36.3  C) (Temporal)   Resp 20   Ht 1.702 m (5' 7\")   Wt 82.4 kg (181 lb 9.6 oz)   BMI 28.44 kg/m    EXAM:  GENERAL: healthy, alert and no distress  EYES: Eyes grossly normal to inspection, PERRL and conjunctivae and sclerae normal  HENT: ear canals and TM's normal, nose and mouth without ulcers or lesions  NECK: no adenopathy, no asymmetry, masses, or scars and thyroid normal to palpation  RESP: lungs clear to auscultation - no rales, rhonchi or wheezes  CV: regular rate and rhythm, normal S1 S2, no S3 or S4, no murmur, click or rub, no peripheral edema and peripheral pulses strong  ABDOMEN: soft, nontender, no hepatosplenomegaly, no masses and bowel sounds normal   (male): normal male genitalia without lesions or urethral discharge, no hernia  MS: no gross musculoskeletal defects noted, no edema  SKIN: no suspicious lesions or rashes  SKIN: no suspicious lesions or rashes and keratoses - actinic # 2 coalesced right forearm - frozen  NEURO: " Normal strength and tone, mentation intact and speech normal  PSYCH: mentation appears normal, affect normal/bright    Diagnostic Test Results:  Labs reviewed in Epic    ASSESSMENT/PLAN:   (Z00.00) Routine general medical examination at a health care facility  (primary encounter diagnosis)  Comment: discussed preventitive healthcare   Plan: Continue to work on healthy diet and exercise, discussed healthy habits     (B00.1) Recurrent cold sores  Comment: well contreolled  Plan: valACYclovir (VALTREX) 500 MG tablet        continue current medications at current doses     (N52.9) Erectile dysfunction, unspecified erectile dysfunction type  Comment: well controlled  Plan: continue current medications at current doses     (Z13.220) Screening for lipoid disorders  Comment:   Plan: VENOUS COLLECTION, Lipid Panel ([facility])            (Z13.1) Screening for diabetes mellitus  Comment:   Plan: Comprehensive Metobolic Panel ([facility]), VENOUS         COLLECTION            (Z12.5) Special screening for malignant neoplasm of prostate  Comment:   Plan: VENOUS COLLECTION, PSA Total ([facility])            (Z23) Need for vaccination  Comment:   Plan:     (L57.0) Actinic keratosis  Comment: pt with likely coalesced AK, treated previously by derm, requests cryo  Plan: DESTRUCT BENIGN LESION, UP TO 14        Liquid nitrogen was applied for 5-10 seconds x3  to the skin lesions and the expected blistering or scabbing reaction explained. Do not pick at the areas. Patient reminded to expect hypopigmented scars from the procedure. Return if lesions fail to fully resolve.      Patient has been advised of split billing requirements and indicates understanding: Yes  COUNSELING:  Reviewed preventive health counseling, as reflected in patient instructions       Regular exercise       Healthy diet/nutrition       Vision screening       Immunizations  Vaccinated for: Influenza           Colorectal cancer screening       Prostate cancer screening    Estimated  "body mass index is 28.44 kg/m  as calculated from the following:    Height as of this encounter: 1.702 m (5' 7\").    Weight as of this encounter: 82.4 kg (181 lb 9.6 oz).    Weight management plan: Discussed healthy diet and exercise guidelines    He reports that he has quit smoking. He has never used smokeless tobacco.      Counseling Resources:  ATP IV Guidelines  Pooled Cohorts Equation Calculator  FRAX Risk Assessment  ICSI Preventive Guidelines  Dietary Guidelines for Americans, 2010  USDA's MyPlate  ASA Prophylaxis  Lung CA Screening    Redd Tejada MD  [facility]  "

## 2023-11-13 NOTE — NURSING NOTE
Benedict De Anda is here for a CPX.    Pre-visit planning  Immunizations -discussed  Colonoscopy -is up to date  Mammogram -  Asthma test --  PHQ9 -  GIA 7 -      Questioned patient about current smoking habits.  Pt. quit smoking some time ago.  Body mass index is 28.44 kg/m .  PULSE regular  My Chart: active  CLASSIFICATION OF OVERWEIGHT AND OBESITY BY BMI                        Obesity Class           BMI(kg/m2)  Underweight                                    < 18.5  Normal                                         18.5-24.9  Overweight                                     25.0-29.9  OBESITY                     I                  30.0-34.9                             II                 35.0-39.9  EXTREME OBESITY             III                >40                            Patient's  BMI Body mass index is 28.44 kg/m .      The patient has verbalized that it is ok to leave a detailed voice message on the patient's cell phone with results/recommendations from this visit.

## 2023-11-14 LAB — ABBOTT PSA - QUEST: 0.94 NG/ML

## 2024-05-01 ENCOUNTER — LAB (OUTPATIENT)
Dept: LAB | Facility: CLINIC | Age: 64
End: 2024-05-01
Payer: COMMERCIAL

## 2024-05-01 ENCOUNTER — OFFICE VISIT (OUTPATIENT)
Dept: FAMILY MEDICINE | Facility: CLINIC | Age: 64
End: 2024-05-01

## 2024-05-01 VITALS
SYSTOLIC BLOOD PRESSURE: 122 MMHG | BODY MASS INDEX: 28.66 KG/M2 | WEIGHT: 183 LBS | HEART RATE: 71 BPM | OXYGEN SATURATION: 97 % | DIASTOLIC BLOOD PRESSURE: 78 MMHG | TEMPERATURE: 98.5 F

## 2024-05-01 DIAGNOSIS — R07.9 CHEST PAIN: Primary | ICD-10-CM

## 2024-05-01 DIAGNOSIS — R07.89 OTHER CHEST PAIN: Primary | ICD-10-CM

## 2024-05-01 LAB — D DIMER PPP FEU-MCNC: 0.45 UG/ML FEU (ref 0–0.5)

## 2024-05-01 PROCEDURE — 85379 FIBRIN DEGRADATION QUANT: CPT

## 2024-05-01 PROCEDURE — 99214 OFFICE O/P EST MOD 30 MIN: CPT | Performed by: FAMILY MEDICINE

## 2024-05-01 PROCEDURE — 36415 COLL VENOUS BLD VENIPUNCTURE: CPT

## 2024-05-01 PROCEDURE — 93000 ELECTROCARDIOGRAM COMPLETE: CPT | Performed by: FAMILY MEDICINE

## 2024-05-01 NOTE — NURSING NOTE
Chief Complaint   Patient presents with    Chest Pain     Chest pressure pain and pressure lower rib cage. This morning starts all the way across chest and comes back towards chest. Feels like spasms. No congestion or coughing. Took Covid. Feels like someone is sitting on his chest, SOB. First started 4 days ago.

## 2024-05-01 NOTE — PROGRESS NOTES
Assessment & Plan     Other chest pain  differential diagnosis includes cardiac etiology vs. pulmonary  vs. gerd vs. muskuloskeletal vs. psychosomatic -pt does not appear in any distress    Pt symptoms did not worsen with strenuous exercise which is somewhat reassurring, EKG normal discussed EKG does not guarantee clear coronary arteries, no recent coughing illness to suggest pleurisy, costochondritis possible but nontender and pain not particularly positional per patient, no GERD symptoms     Given recent travel feel we need to exclude PE    Pt will be sent for D Dimer at hospital lab-result called to me and I will contact pt    If negative can watch symptoms and set up outpatient stress, ok to try nsaids with food, if any worsening he agrees to go to ED immediately    Pt is agreeable to plan as outpatient and understands risks-offered option to go to ED now for troponins but agree it reasonable to holdoff unless worsening    patient given instructions to go to emergency department immediately if worsening of symptoms and verbalizes this understanding   - EKG 12-lead complete w/read - Clinics      Review of the result(s) of each unique test - EKG wnl  Ordering of each unique test  32 minutes spent by me on the date of the encounter doing chart review, review of test results, interpretation of tests, patient visit, and documentation             No follow-ups on file.    Bonnie Resendiz is a 63 year old, presenting for the following health issues:  Chest Pain (Chest pressure pain and pressure lower rib cage. This morning starts all the way across chest and comes back towards chest. Feels like spasms. No congestion or coughing. Took Covid. Feels like someone is sitting on his chest, SOB. First started 4 days ago. )    HPI       Chest Pain  Onset/Duration: 4 days  Description:   Location: midline lower ribs region and mid chest  Character: heavy and pressure  Radiation: none  Duration: 4 days   Intensity: mild,  moderate, severe  Progression of Symptoms: waxing and waning-worst thi am  Accompanying Signs & Symptoms:  Shortness of breath: YES  Sweating: No  Nausea/vomiting: No  Lightheadedness: YES  Palpitations: No  Fever/Chills: No  Cough: No           Heartburn: no new symptoms , does have heartburn at times  History:   Family history of heart disease: YES- father MI in 60's  Tobacco use: No  Previous similar symptoms: no   Precipitating factors:   Worse with exertion: No  Worse with deep breaths: No           Related to eating: No           Better with burping: No  Alleviating factors: none  Therapies tried and outcome: enema as he thought he was constipated, tums- no help    Pt was able to exercise hard last night and felt OK during exercise-no worsening of symptoms     Pt was flying for work in last week- got back the day before symptoms started    No calf pain      The 10-year ASCVD risk score (Mejia DODGE, et al., 2019) is: 6%    Values used to calculate the score:      Age: 63 years      Sex: Male      Is Non- : No      Diabetic: No      Tobacco smoker: No      Systolic Blood Pressure: 122 mmHg      Is BP treated: No      HDL Cholesterol: 61 mg/dL      Total Cholesterol: 136 mg/dL     Lipids ok    Review of Systems  Constitutional, HEENT, cardiovascular, pulmonary, gi and gu systems are negative, except as otherwise noted.      Objective    /78 (BP Location: Left arm, Patient Position: Sitting, Cuff Size: Adult Large)   Pulse 71   Temp 98.5  F (36.9  C) (Temporal)   Wt 83 kg (183 lb)   SpO2 97%   BMI 28.66 kg/m    Body mass index is 28.66 kg/m .  Physical Exam   GENERAL: alert and no distress  EYES: Eyes grossly normal to inspection, PERRL and conjunctivae and sclerae normal  HENT: ear canals and TM's normal, nose and mouth without ulcers or lesions  NECK: no adenopathy, no asymmetry, masses, or scars  RESP: lungs clear to auscultation - no rales, rhonchi or wheezes  CV: regular rate  and rhythm, normal S1 S2, no S3 or S4, no murmur, click or rub, no peripheral edema  ABDOMEN: soft, nontender, no hepatosplenomegaly, no masses and bowel sounds normal  MS: no gross musculoskeletal defects noted, no edema, neg Homans  PSYCH: mentation appears normal, affect normal/bright            Signed Electronically by: Redd Tejada MD

## 2024-05-06 ENCOUNTER — APPOINTMENT (OUTPATIENT)
Dept: ULTRASOUND IMAGING | Facility: CLINIC | Age: 64
End: 2024-05-06
Attending: EMERGENCY MEDICINE
Payer: COMMERCIAL

## 2024-05-06 ENCOUNTER — HOSPITAL ENCOUNTER (EMERGENCY)
Facility: CLINIC | Age: 64
Discharge: HOME | End: 2024-05-06
Attending: EMERGENCY MEDICINE | Admitting: EMERGENCY MEDICINE
Payer: COMMERCIAL

## 2024-05-06 VITALS
HEART RATE: 60 BPM | RESPIRATION RATE: 18 BRPM | TEMPERATURE: 98.4 F | OXYGEN SATURATION: 100 % | SYSTOLIC BLOOD PRESSURE: 139 MMHG | DIASTOLIC BLOOD PRESSURE: 91 MMHG

## 2024-05-06 DIAGNOSIS — R07.9 CHEST PAIN, UNSPECIFIED TYPE: ICD-10-CM

## 2024-05-06 DIAGNOSIS — R10.13 EPIGASTRIC PAIN: ICD-10-CM

## 2024-05-06 DIAGNOSIS — R74.8 ELEVATED LIPASE: ICD-10-CM

## 2024-05-06 LAB
ALBUMIN SERPL BCG-MCNC: 4.4 G/DL (ref 3.5–5.2)
ALP SERPL-CCNC: 78 U/L (ref 40–150)
ALT SERPL W P-5'-P-CCNC: 22 U/L (ref 0–70)
ANION GAP SERPL CALCULATED.3IONS-SCNC: 9 MMOL/L (ref 7–15)
AST SERPL W P-5'-P-CCNC: 31 U/L (ref 0–45)
BASOPHILS # BLD AUTO: 0.1 10E3/UL (ref 0–0.2)
BASOPHILS NFR BLD AUTO: 1 %
BILIRUB DIRECT SERPL-MCNC: <0.2 MG/DL (ref 0–0.3)
BILIRUB SERPL-MCNC: 0.7 MG/DL
BUN SERPL-MCNC: 23.9 MG/DL (ref 8–23)
CALCIUM SERPL-MCNC: 9.1 MG/DL (ref 8.8–10.2)
CHLORIDE SERPL-SCNC: 108 MMOL/L (ref 98–107)
CREAT SERPL-MCNC: 1.14 MG/DL (ref 0.67–1.17)
DEPRECATED HCO3 PLAS-SCNC: 25 MMOL/L (ref 22–29)
EGFRCR SERPLBLD CKD-EPI 2021: 73 ML/MIN/1.73M2
EOSINOPHIL # BLD AUTO: 1 10E3/UL (ref 0–0.7)
EOSINOPHIL NFR BLD AUTO: 11 %
ERYTHROCYTE [DISTWIDTH] IN BLOOD BY AUTOMATED COUNT: 12.5 % (ref 10–15)
GLUCOSE SERPL-MCNC: 98 MG/DL (ref 70–99)
HCT VFR BLD AUTO: 45.3 % (ref 40–53)
HGB BLD-MCNC: 15.3 G/DL (ref 13.3–17.7)
IMM GRANULOCYTES # BLD: 0 10E3/UL
IMM GRANULOCYTES NFR BLD: 0 %
LIPASE SERPL-CCNC: 125 U/L (ref 13–60)
LYMPHOCYTES # BLD AUTO: 2.1 10E3/UL (ref 0.8–5.3)
LYMPHOCYTES NFR BLD AUTO: 24 %
MCH RBC QN AUTO: 32.7 PG (ref 26.5–33)
MCHC RBC AUTO-ENTMCNC: 33.8 G/DL (ref 31.5–36.5)
MCV RBC AUTO: 97 FL (ref 78–100)
MONOCYTES # BLD AUTO: 0.9 10E3/UL (ref 0–1.3)
MONOCYTES NFR BLD AUTO: 10 %
NEUTROPHILS # BLD AUTO: 4.7 10E3/UL (ref 1.6–8.3)
NEUTROPHILS NFR BLD AUTO: 54 %
NRBC # BLD AUTO: 0 10E3/UL
NRBC BLD AUTO-RTO: 0 /100
PLATELET # BLD AUTO: 206 10E3/UL (ref 150–450)
POTASSIUM SERPL-SCNC: 4.4 MMOL/L (ref 3.4–5.3)
PROT SERPL-MCNC: 6.9 G/DL (ref 6.4–8.3)
RBC # BLD AUTO: 4.68 10E6/UL (ref 4.4–5.9)
SODIUM SERPL-SCNC: 142 MMOL/L (ref 135–145)
TROPONIN T SERPL HS-MCNC: 8 NG/L
WBC # BLD AUTO: 8.7 10E3/UL (ref 4–11)

## 2024-05-06 PROCEDURE — 85025 COMPLETE CBC W/AUTO DIFF WBC: CPT | Performed by: EMERGENCY MEDICINE

## 2024-05-06 PROCEDURE — 93005 ELECTROCARDIOGRAM TRACING: CPT

## 2024-05-06 PROCEDURE — 76705 ECHO EXAM OF ABDOMEN: CPT

## 2024-05-06 PROCEDURE — 84484 ASSAY OF TROPONIN QUANT: CPT | Performed by: EMERGENCY MEDICINE

## 2024-05-06 PROCEDURE — 80053 COMPREHEN METABOLIC PANEL: CPT | Performed by: EMERGENCY MEDICINE

## 2024-05-06 PROCEDURE — 82248 BILIRUBIN DIRECT: CPT | Performed by: EMERGENCY MEDICINE

## 2024-05-06 PROCEDURE — 80048 BASIC METABOLIC PNL TOTAL CA: CPT | Performed by: EMERGENCY MEDICINE

## 2024-05-06 PROCEDURE — 36415 COLL VENOUS BLD VENIPUNCTURE: CPT | Performed by: EMERGENCY MEDICINE

## 2024-05-06 PROCEDURE — 250N000013 HC RX MED GY IP 250 OP 250 PS 637: Performed by: EMERGENCY MEDICINE

## 2024-05-06 PROCEDURE — 83690 ASSAY OF LIPASE: CPT | Performed by: EMERGENCY MEDICINE

## 2024-05-06 PROCEDURE — 85041 AUTOMATED RBC COUNT: CPT | Performed by: EMERGENCY MEDICINE

## 2024-05-06 PROCEDURE — 99285 EMERGENCY DEPT VISIT HI MDM: CPT | Mod: 25

## 2024-05-06 RX ORDER — MAGNESIUM HYDROXIDE/ALUMINUM HYDROXICE/SIMETHICONE 120; 1200; 1200 MG/30ML; MG/30ML; MG/30ML
30 SUSPENSION ORAL ONCE
Status: COMPLETED | OUTPATIENT
Start: 2024-05-06 | End: 2024-05-06

## 2024-05-06 RX ADMIN — ALUMINUM HYDROXIDE, MAGNESIUM HYDROXIDE, AND DIMETHICONE 30 ML: 200; 20; 200 SUSPENSION ORAL at 19:32

## 2024-05-06 ASSESSMENT — COLUMBIA-SUICIDE SEVERITY RATING SCALE - C-SSRS
1. IN THE PAST MONTH, HAVE YOU WISHED YOU WERE DEAD OR WISHED YOU COULD GO TO SLEEP AND NOT WAKE UP?: NO
6. HAVE YOU EVER DONE ANYTHING, STARTED TO DO ANYTHING, OR PREPARED TO DO ANYTHING TO END YOUR LIFE?: NO
2. HAVE YOU ACTUALLY HAD ANY THOUGHTS OF KILLING YOURSELF IN THE PAST MONTH?: NO

## 2024-05-06 ASSESSMENT — ACTIVITIES OF DAILY LIVING (ADL)
ADLS_ACUITY_SCORE: 35
ADLS_ACUITY_SCORE: 35
ADLS_ACUITY_SCORE: 33

## 2024-05-06 NOTE — ED TRIAGE NOTES
Pt arrives with complaint of 10 days of intermittent chest pains. Saw PCP last Tuesday and was in ED for PE workup which was negative. Was told to return if not improving or worsening. Sent from  today after having EKG and CXR done which both looked normal, here for further workup. A&OX4.          Elidel Pregnancy And Lactation Text: This medication is Pregnancy Category C. It is unknown if this medication is excreted in breast milk.

## 2024-05-07 LAB
ATRIAL RATE - MUSE: 53 BPM
DIASTOLIC BLOOD PRESSURE - MUSE: NORMAL MMHG
INTERPRETATION ECG - MUSE: NORMAL
P AXIS - MUSE: 48 DEGREES
PR INTERVAL - MUSE: 154 MS
QRS DURATION - MUSE: 104 MS
QT - MUSE: 462 MS
QTC - MUSE: 433 MS
R AXIS - MUSE: -4 DEGREES
SYSTOLIC BLOOD PRESSURE - MUSE: NORMAL MMHG
T AXIS - MUSE: 4 DEGREES
VENTRICULAR RATE- MUSE: 53 BPM

## 2024-05-07 NOTE — ED PROVIDER NOTES
History     Chief Complaint:  Chest Pain       HPI   Benedict De Anda is a 63 year old male who presents with chest pain. Patient reports onset of chest pain 10 days ago. Two-three days later reports severe spasms across his abdomen. Pain is constant. Pain is worse at night. Pain is not associated with exertion. Pain is exacerbated when drinking large amounts of liquid, this causes shooting pain. The pain has now moved toward the center of the chest and radiates to the back. He has been taking ibuprofen without relief. Last alcoholic drink was yesterday. Denies regular alcohol use and does not smoke. Denies melena, hematochezia and vomiting.  History of indigestion and very regularly uses Tums.  Does not take any other acid reducing medication.  At time of my evaluation, patient states that he has had constant unchanged upper abdominal and lower chest discomfort in a band across the upper abdomen and tacking up under the left ribs for the past 2 to 3 days    Review of External notes  PCP visit 5/1 - EKG unremarkable, D-dimer negative   Chest XR at [facility] earlier today-negative per radiology report      Medications:    Viagra   Valtrex     Past Medical History:    HSV infection   Allergic rhinitis   ED      Past Surgical History:    HC Knee scope with lateral release, knee spur        Physical Exam   Patient Vitals for the past 24 hrs:   BP Temp Temp src Pulse Resp SpO2   05/06/24 1647 (!) 139/91 98.4  F (36.9  C) Temporal 60 18 100 %          Physical Exam    Gen: alert  Neck: normal ROM  CV: RRR, 2+ distal pulses in all 4 extremities  Chest: no tenderness over the chest wall  Pulm: breath sounds equal, lungs clear  Abd: Soft, mild left upper quadrant tenderness.   Back: no evidence of injury  MSK: no lower extremity edema, no calf tenderness  Skin: no rash  Neuro: alert, appropriate conversation and interaction     Emergency Department Course   ECG:  ECG results from 05/06/24   EKG 12-lead, tracing only      Value    Systolic Blood Pressure     Diastolic Blood Pressure     Ventricular Rate 53    Atrial Rate 53    WY Interval 154    QRS Duration 104        QTc 433    P Axis 48    R AXIS -4    T Axis 4    Interpretation ECG      Sinus bradycardia  Incomplete right bundle branch block  Borderline ECG  When compared with ECG of 22-MAR-2011 09:21,  No significant change was found         Imaging:  US Abdomen Limited   Final Result   IMPRESSION:   1.  Normal limited abdominal ultrasound.            Chest x-ray      Laboratory:  Labs Ordered and Resulted from Time of ED Arrival to Time of ED Departure   BASIC METABOLIC PANEL - Abnormal       Result Value    Sodium 142      Potassium 4.4      Chloride 108 (*)     Carbon Dioxide (CO2) 25      Anion Gap 9      Urea Nitrogen 23.9 (*)     Creatinine 1.14      GFR Estimate 73      Calcium 9.1      Glucose 98     CBC WITH PLATELETS AND DIFFERENTIAL - Abnormal    WBC Count 8.7      RBC Count 4.68      Hemoglobin 15.3      Hematocrit 45.3      MCV 97      MCH 32.7      MCHC 33.8      RDW 12.5      Platelet Count 206      % Neutrophils 54      % Lymphocytes 24      % Monocytes 10      % Eosinophils 11      % Basophils 1      % Immature Granulocytes 0      NRBCs per 100 WBC 0      Absolute Neutrophils 4.7      Absolute Lymphocytes 2.1      Absolute Monocytes 0.9      Absolute Eosinophils 1.0 (*)     Absolute Basophils 0.1      Absolute Immature Granulocytes 0.0      Absolute NRBCs 0.0     TROPONIN T, HIGH SENSITIVITY - Normal    Troponin T, High Sensitivity 8            ED Course as of 05/07/24 0359   Mon May 06, 2024   2017 I obtained history and examined the patient as noted above.          Interventions:  Medications - No data to display     Impression & Plan    Independent Interpretation:      Medical Decision Making:  Patient presents for evaluation of chest and upper abdominal pain.  Symptoms do not have any exertional component.  EKG shows sinus rhythm without evidence  of ischemia.  Troponin negative in the setting of days of ongoing consistent nonexertional symptoms.  I felt this is sufficient to exclude ACS.  Overall vital signs reassuring with no tachycardia or hypoxia.  D-dimer was obtained at recent primary care visit and negative.  I felt this was sufficient to exclude PE.  Chest x-ray obtained at [facility] urgent care earlier today was read as normal by radiology.  Cardiac standpoint at this is very reassuring.  On my exam, patient is left upper quadrant tenderness.  I suggest did that we proceed with more GI focused workup.  Hepatic panel and lipase were obtained.  Normal hepatic function test.  Mildly elevated lipase not meeting criteria for diagnosis of pancreatitis.  Right upper quadrant ultrasound obtained which showed no gallstones or other acute pathology.  Notably no signs of cholecystitis.  At this time, will treat for gastritis/GERD.  Start Prilosec.  Advised patient to stop NSAIDs and avoid alcohol.  Prilosec 40 mg daily x 2 weeks.  Maalox TD AD as needed and at bedtime.  Strict return precautions discussed.  Follow-up with primary care in 1 week to assess response to PPI.  If worsening symptoms return to emergency ferment such as fever increasing pain vomiting black or bloody stool.  Patient stressed understanding.  Discharged home    Disposition:  Discharge    Diagnosis:    ICD-10-CM    1. Chest pain, unspecified type  R07.9       2. Epigastric pain  R10.13       3. Elevated lipase  R74.8            Discharge Medications: Recommended OTC Prilosec and Maalox  Discharge Medication List as of 5/6/2024  8:52 PM           Opal Mcclure  May 6, 2024        Opal Mcclure MD  05/07/24 0404

## 2024-05-07 NOTE — DISCHARGE INSTRUCTIONS
Stop taking ibuprofen, naproxen or any other NSAIDs.  Avoid alcohol and spicy foods.  Start prilosec 40 mg daily for 2 weeks.  Take maalox 3x per day as needed.  Recommended maalox at bedtime for 1 week.

## 2024-11-10 DIAGNOSIS — B00.1 RECURRENT COLD SORES: ICD-10-CM

## 2024-11-12 RX ORDER — VALACYCLOVIR HYDROCHLORIDE 500 MG/1
500 TABLET, FILM COATED ORAL DAILY
Qty: 30 TABLET | Refills: 0 | Status: SHIPPED | OUTPATIENT
Start: 2024-11-12

## 2024-11-12 NOTE — TELEPHONE ENCOUNTER
Pt scheduled appt for 12/10 needs extension of his Valcyclovir.    Benedict De Anda is requesting a refill of:    Pending Prescriptions:                       Disp   Refills    valACYclovir (VALTREX) 500 MG tablet [Pha*30 tab*0            Sig: TAKE 1 TABLET(500 MG) BY MOUTH DAILY

## 2024-12-10 ENCOUNTER — OFFICE VISIT (OUTPATIENT)
Dept: FAMILY MEDICINE | Facility: CLINIC | Age: 64
End: 2024-12-10

## 2024-12-10 VITALS
TEMPERATURE: 97.1 F | HEART RATE: 56 BPM | HEIGHT: 67 IN | DIASTOLIC BLOOD PRESSURE: 88 MMHG | RESPIRATION RATE: 20 BRPM | WEIGHT: 190.2 LBS | SYSTOLIC BLOOD PRESSURE: 126 MMHG | BODY MASS INDEX: 29.85 KG/M2

## 2024-12-10 DIAGNOSIS — Z13.220 SCREENING FOR LIPOID DISORDERS: ICD-10-CM

## 2024-12-10 DIAGNOSIS — N52.9 ERECTILE DYSFUNCTION, UNSPECIFIED ERECTILE DYSFUNCTION TYPE: ICD-10-CM

## 2024-12-10 DIAGNOSIS — Z23 NEED FOR VACCINATION: ICD-10-CM

## 2024-12-10 DIAGNOSIS — B00.1 RECURRENT COLD SORES: ICD-10-CM

## 2024-12-10 DIAGNOSIS — Z00.00 ROUTINE GENERAL MEDICAL EXAMINATION AT A HEALTH CARE FACILITY: Primary | ICD-10-CM

## 2024-12-10 DIAGNOSIS — Z12.5 SPECIAL SCREENING FOR MALIGNANT NEOPLASM OF PROSTATE: ICD-10-CM

## 2024-12-10 DIAGNOSIS — R73.01 ABNORMAL FASTING GLUCOSE: ICD-10-CM

## 2024-12-10 LAB
ALBUMIN SERPL-MCNC: 4.3 G/DL (ref 3.6–5.1)
ALP SERPL-CCNC: 63 U/L (ref 33–130)
ALT 1742-6: 21 U/L (ref 0–32)
AST 1920-8: 19 U/L (ref 0–35)
BILIRUB SERPL-MCNC: 1.5 MG/DL (ref 0.2–1.2)
BUN SERPL-MCNC: 22 MG/DL (ref 7–25)
BUN/CREATININE RATIO: 20 (ref 6–32)
CALCIUM SERPL-MCNC: 9.5 MG/DL (ref 8.6–10.3)
CHLORIDE SERPLBLD-SCNC: 106.5 MMOL/L (ref 98–110)
CHOLEST SERPL-MCNC: 149 MG/DL (ref 0–199)
CHOLEST/HDLC SERPL: 3 {RATIO} (ref 0–5)
CO2 SERPL-SCNC: 25 MMOL/L (ref 20–32)
CREAT SERPL-MCNC: 1.1 MG/DL (ref 0.6–1.3)
GLUCOSE SERPL-MCNC: 94 MG/DL (ref 60–99)
HDLC SERPL-MCNC: 59 MG/DL (ref 40–150)
LDLC SERPL CALC-MCNC: 77 MG/DL (ref 0–129)
POTASSIUM SERPL-SCNC: 4.18 MMOL/L (ref 3.5–5.3)
PROT SERPL-MCNC: 6.7 G/DL (ref 6.1–8.1)
SODIUM SERPL-SCNC: 140.3 MMOL/L (ref 135–146)
TRIGL SERPL-MCNC: 66 MG/DL (ref 0–149)

## 2024-12-10 PROCEDURE — 80061 LIPID PANEL: CPT | Performed by: FAMILY MEDICINE

## 2024-12-10 PROCEDURE — 80053 COMPREHEN METABOLIC PANEL: CPT | Performed by: FAMILY MEDICINE

## 2024-12-10 PROCEDURE — 84153 ASSAY OF PSA TOTAL: CPT | Mod: 90 | Performed by: FAMILY MEDICINE

## 2024-12-10 PROCEDURE — 90471 IMMUNIZATION ADMIN: CPT | Performed by: FAMILY MEDICINE

## 2024-12-10 PROCEDURE — 90656 IIV3 VACC NO PRSV 0.5 ML IM: CPT | Performed by: FAMILY MEDICINE

## 2024-12-10 PROCEDURE — 36415 COLL VENOUS BLD VENIPUNCTURE: CPT | Performed by: FAMILY MEDICINE

## 2024-12-10 PROCEDURE — 99396 PREV VISIT EST AGE 40-64: CPT | Mod: 25 | Performed by: FAMILY MEDICINE

## 2024-12-10 RX ORDER — SILDENAFIL 100 MG/1
100 TABLET, FILM COATED ORAL DAILY PRN
Qty: 30 TABLET | Refills: 0 | Status: SHIPPED | OUTPATIENT
Start: 2024-12-10 | End: 2024-12-10

## 2024-12-10 RX ORDER — VALACYCLOVIR HYDROCHLORIDE 500 MG/1
500 TABLET, FILM COATED ORAL DAILY
Qty: 90 TABLET | Refills: 3 | Status: SHIPPED | OUTPATIENT
Start: 2024-12-10

## 2024-12-10 RX ORDER — SILDENAFIL 100 MG/1
100 TABLET, FILM COATED ORAL DAILY PRN
Qty: 30 TABLET | Refills: 0 | Status: SHIPPED | OUTPATIENT
Start: 2024-12-10

## 2024-12-10 NOTE — PROGRESS NOTES
3  SUBJECTIVE:   CC: Benedict De Anda is an 64 year old male who presents for preventive health visit.       Patient has been advised of split billing requirements and indicates understanding: Yes  Healthy Habits:  Do you get at least three servings of calcium containing foods daily (dairy, green leafy vegetables, etc.)? yes  Amount of exercise or daily activities, outside of work: 4 day(s) per week  Problems taking medications regularly No  Medication side effects: No  Have you had an eye exam in the past two years? yes  Do you see a dentist twice per year? yes  Do you have sleep apnea, excessive snoring or daytime drowsiness?no          Today's PHQ-2 Score:       11/13/2023    11:05 AM 10/19/2022    12:41 PM   PHQ-2 ( 1999 Pfizer)   Q1: Little interest or pleasure in doing things 0 0   Q2: Feeling down, depressed or hopeless 0 0   PHQ-2 Score 0 0       Abuse: Current or Past(Physical, Sexual or Emotional)- No  Do you feel safe in your environment? Yes        Social History     Tobacco Use    Smoking status: Former     Passive exposure: Past    Smokeless tobacco: Never    Tobacco comments:     stopped age 26   Substance Use Topics    Alcohol use: Yes     Alcohol/week: 4.0 standard drinks of alcohol     Types: 4 Standard drinks or equivalent per week     If you drink alcohol do you typically have >3 drinks per day or >7 drinks per week? No                      Last PSA:   Abbott PSA   Date Value Ref Range Status   11/13/2023 0.94 < OR = 4.00 ng/mL Final     Comment:     The total PSA value from this assay system is   standardized against the WHO standard. The test   result will be approximately 20% lower when compared   to the equimolar-standardized total PSA (Rodrigo   Stephen). Comparison of serial PSA results should be   interpreted with this fact in mind.     This test was performed using the Siemens   chemiluminescent method. Values obtained from   different assay methods cannot be used  interchangeably. PSA  levels, regardless of  value, should not be interpreted as absolute  evidence of the presence or absence of disease.         Reviewed orders with patient. Reviewed health maintenance and updated orders accordingly - Yes  BP Readings from Last 3 Encounters:   12/10/24 126/88   05/06/24 (!) 139/91   05/01/24 122/78    Wt Readings from Last 3 Encounters:   12/10/24 86.3 kg (190 lb 3.2 oz)   05/01/24 83 kg (183 lb)   11/13/23 82.4 kg (181 lb 9.6 oz)                  Patient Active Problem List   Diagnosis    Herpes simplex virus (HSV) infection    Allergic rhinitis    ED (erectile dysfunction)    ACP (advance care planning)     Past Surgical History:   Procedure Laterality Date    HC KNEE SCOPE, W/LATERAL RELEASE  1992    knee spur       Social History     Tobacco Use    Smoking status: Former     Passive exposure: Past    Smokeless tobacco: Never    Tobacco comments:     stopped age 26   Substance Use Topics    Alcohol use: Yes     Alcohol/week: 4.0 standard drinks of alcohol     Types: 4 Standard drinks or equivalent per week     Family History   Problem Relation Age of Onset    C.A.D. Father         bypass    Hypertension Father     Lipids Father     Diabetes No family hx of          Current Outpatient Medications   Medication Sig Dispense Refill    sildenafil (VIAGRA) 100 MG tablet Take 1 tablet (100 mg) by mouth daily as needed. 30 tablet 0    valACYclovir (VALTREX) 500 MG tablet Take 1 tablet (500 mg) by mouth daily. 90 tablet 3     Allergies   Allergen Reactions    Amoxicillin Hives     Recent Labs   Lab Test 05/06/24  1657 11/13/23  0000 02/22/18  1348   LDL  --  67 73   HDL  --  61 51   TRIG  --  41 46   ALT 22  --  62*   CR 1.14 1.11 1.11   GFRESTIMATED 73  --  74   POTASSIUM 4.4 4.9 4.4        Reviewed and updated as needed this visit by clinical staff   Tobacco  Allergies  Meds              Reviewed and updated as needed this visit by Provider                  Past Medical History:   Diagnosis Date     "Allergic rhinitis, cause unspecified 8/22/2005    testing 2006    Herpes simplex without mention of complication 8/22/2005      Past Surgical History:   Procedure Laterality Date     KNEE SCOPE, W/LATERAL RELEASE  1992    knee spur       ROS:  CONSTITUTIONAL: NEGATIVE for fever, chills, change in weight  INTEGUMENTARY/SKIN: NEGATIVE for worrisome rashes, moles or lesions  EYES: NEGATIVE for vision changes or irritation  ENT: NEGATIVE for ear, mouth and throat problems  RESP: NEGATIVE for significant cough or SOB  CV: NEGATIVE for chest pain, palpitations or peripheral edema  GI: NEGATIVE for nausea, abdominal pain, heartburn, or change in bowel habits   male: negative for dysuria, hematuria, decreased urinary stream, erectile dysfunction, urethral discharge  MUSCULOSKELETAL: NEGATIVE for significant arthralgias or myalgia  NEURO: NEGATIVE for weakness, dizziness or paresthesias  PSYCHIATRIC: NEGATIVE for changes in mood or affect    OBJECTIVE:   /88 (BP Location: Left arm, Patient Position: Chair, Cuff Size: Adult Regular)   Pulse 56   Temp 97.1  F (36.2  C) (Temporal)   Resp 20   Ht 1.702 m (5' 7\")   Wt 86.3 kg (190 lb 3.2 oz)   BMI 29.79 kg/m    EXAM:  GENERAL: alert and no distress  EYES: Eyes grossly normal to inspection, PERRL and conjunctivae and sclerae normal  HENT: ear canals and TM's normal, nose and mouth without ulcers or lesions  NECK: no adenopathy, no asymmetry, masses, or scars  RESP: lungs clear to auscultation - no rales, rhonchi or wheezes  CV: regular rate and rhythm, normal S1 S2, no S3 or S4, no murmur, click or rub, no peripheral edema  ABDOMEN: soft, nontender, no hepatosplenomegaly, no masses and bowel sounds normal   (male): normal male genitalia without lesions or urethral discharge, no hernia  MS: no gross musculoskeletal defects noted, no edema  SKIN: no suspicious lesions or rashes  NEURO: Normal strength and tone, mentation intact and speech normal  PSYCH: mentation " "appears normal, affect normal/bright    Diagnostic Test Results:  Labs reviewed in Epic    ASSESSMENT/PLAN:   (Z00.00) Routine general medical examination at a health care facility  (primary encounter diagnosis)  Comment: discussed preventitive healthcare   Plan: Continue to work on healthy diet and exercise, discussed healthy habits     (Z13.220) Screening for lipoid disorders  Comment:   Plan: Lipid Panel ([facility]), VENOUS COLLECTION            (R73.01) Abnormal fasting glucose  Comment:   Plan: Comprehensive Metobolic Panel ([facility]), VENOUS         COLLECTION        Continue to work on healthy diet and exercise, discussed healthy habits     (Z12.5) Special screening for malignant neoplasm of prostate  Comment:   Plan: VENOUS COLLECTION, PSA Total ([facility])            (N52.9) Erectile dysfunction, unspecified erectile dysfunction type  Comment: well controlled  Plan: sildenafil (VIAGRA) 100 MG tablet            (B00.1) Recurrent cold sores  Comment: well controlled  Plan: valACYclovir (VALTREX) 500 MG tablet            Patient has been advised of split billing requirements and indicates understanding: Yes  COUNSELING:  Reviewed preventive health counseling, as reflected in patient instructions       Regular exercise       Healthy diet/nutrition       Vision screening       Immunizations  Recommend covid booster at pharmacy           Colorectal cancer screening       Prostate cancer screening    Estimated body mass index is 29.79 kg/m  as calculated from the following:    Height as of this encounter: 1.702 m (5' 7\").    Weight as of this encounter: 86.3 kg (190 lb 3.2 oz).        He reports that he has quit smoking. He has been exposed to tobacco smoke. He has never used smokeless tobacco.      Counseling Resources:  ATP IV Guidelines  Pooled Cohorts Equation Calculator  FRAX Risk Assessment  ICSI Preventive Guidelines  Dietary Guidelines for Americans, 2010  USDA's MyPlate  ASA Prophylaxis  Lung CA Screening    Redd " Robby Tejada MD  [facility]

## 2024-12-11 LAB — ABBOTT PSA - QUEST: 1.01 NG/ML

## 2025-02-16 ENCOUNTER — APPOINTMENT (OUTPATIENT)
Dept: GENERAL RADIOLOGY | Facility: CLINIC | Age: 65
End: 2025-02-16
Attending: STUDENT IN AN ORGANIZED HEALTH CARE EDUCATION/TRAINING PROGRAM
Payer: COMMERCIAL

## 2025-02-16 ENCOUNTER — APPOINTMENT (OUTPATIENT)
Dept: CT IMAGING | Facility: CLINIC | Age: 65
End: 2025-02-16
Attending: EMERGENCY MEDICINE
Payer: COMMERCIAL

## 2025-02-16 ENCOUNTER — ANESTHESIA EVENT (OUTPATIENT)
Dept: SURGERY | Facility: CLINIC | Age: 65
End: 2025-02-16
Payer: COMMERCIAL

## 2025-02-16 ENCOUNTER — ANESTHESIA (OUTPATIENT)
Dept: SURGERY | Facility: CLINIC | Age: 65
End: 2025-02-16
Payer: COMMERCIAL

## 2025-02-16 ENCOUNTER — HOSPITAL ENCOUNTER (OUTPATIENT)
Facility: CLINIC | Age: 65
Setting detail: OBSERVATION
Discharge: HOME | End: 2025-02-17
Attending: EMERGENCY MEDICINE | Admitting: HOSPITALIST
Payer: COMMERCIAL

## 2025-02-16 DIAGNOSIS — N17.9 ACUTE KIDNEY INJURY: ICD-10-CM

## 2025-02-16 DIAGNOSIS — N28.89 LEFT RENAL MASS: ICD-10-CM

## 2025-02-16 DIAGNOSIS — N20.0 KIDNEY STONE ON LEFT SIDE: Primary | ICD-10-CM

## 2025-02-16 DIAGNOSIS — M89.9 LESION OF PELVIC BONE: ICD-10-CM

## 2025-02-16 DIAGNOSIS — N20.1 URETERAL STONE: ICD-10-CM

## 2025-02-16 LAB
ALBUMIN UR-MCNC: NEGATIVE MG/DL
ANION GAP SERPL CALCULATED.3IONS-SCNC: 11 MMOL/L (ref 7–15)
APPEARANCE UR: CLEAR
BASOPHILS # BLD AUTO: 0 10E3/UL (ref 0–0.2)
BASOPHILS NFR BLD AUTO: 0 %
BILIRUB UR QL STRIP: NEGATIVE
BUN SERPL-MCNC: 28.6 MG/DL (ref 8–23)
CALCIUM SERPL-MCNC: 9.5 MG/DL (ref 8.8–10.4)
CHLORIDE SERPL-SCNC: 107 MMOL/L (ref 98–107)
COLOR UR AUTO: ABNORMAL
CREAT SERPL-MCNC: 1.6 MG/DL (ref 0.67–1.17)
EGFRCR SERPLBLD CKD-EPI 2021: 48 ML/MIN/1.73M2
EOSINOPHIL # BLD AUTO: 0.1 10E3/UL (ref 0–0.7)
EOSINOPHIL NFR BLD AUTO: 1 %
ERYTHROCYTE [DISTWIDTH] IN BLOOD BY AUTOMATED COUNT: 11.9 % (ref 10–15)
GLUCOSE SERPL-MCNC: 111 MG/DL (ref 70–99)
GLUCOSE UR STRIP-MCNC: NEGATIVE MG/DL
HCO3 SERPL-SCNC: 22 MMOL/L (ref 22–29)
HCT VFR BLD AUTO: 42.2 % (ref 40–53)
HGB BLD-MCNC: 15 G/DL (ref 13.3–17.7)
HGB UR QL STRIP: ABNORMAL
HOLD SPECIMEN: NORMAL
HOLD SPECIMEN: NORMAL
IMM GRANULOCYTES # BLD: 0 10E3/UL
IMM GRANULOCYTES NFR BLD: 0 %
KETONES UR STRIP-MCNC: ABNORMAL MG/DL
LEUKOCYTE ESTERASE UR QL STRIP: NEGATIVE
LYMPHOCYTES # BLD AUTO: 1.1 10E3/UL (ref 0.8–5.3)
LYMPHOCYTES NFR BLD AUTO: 10 %
MCH RBC QN AUTO: 33 PG (ref 26.5–33)
MCHC RBC AUTO-ENTMCNC: 35.5 G/DL (ref 31.5–36.5)
MCV RBC AUTO: 93 FL (ref 78–100)
MONOCYTES # BLD AUTO: 1.1 10E3/UL (ref 0–1.3)
MONOCYTES NFR BLD AUTO: 10 %
MUCOUS THREADS #/AREA URNS LPF: PRESENT /LPF
NEUTROPHILS # BLD AUTO: 9.3 10E3/UL (ref 1.6–8.3)
NEUTROPHILS NFR BLD AUTO: 80 %
NITRATE UR QL: NEGATIVE
NRBC # BLD AUTO: 0 10E3/UL
NRBC BLD AUTO-RTO: 0 /100
PH UR STRIP: 6 [PH] (ref 5–7)
PLATELET # BLD AUTO: 189 10E3/UL (ref 150–450)
POTASSIUM SERPL-SCNC: 4.4 MMOL/L (ref 3.4–5.3)
RBC # BLD AUTO: 4.54 10E6/UL (ref 4.4–5.9)
RBC URINE: 72 /HPF
SODIUM SERPL-SCNC: 140 MMOL/L (ref 135–145)
SP GR UR STRIP: 1.01 (ref 1–1.03)
SQUAMOUS EPITHELIAL: <1 /HPF
UROBILINOGEN UR STRIP-MCNC: NORMAL MG/DL
WBC # BLD AUTO: 11.6 10E3/UL (ref 4–11)
WBC URINE: 2 /HPF

## 2025-02-16 PROCEDURE — 74420 UROGRAPHY RTRGR +-KUB: CPT | Mod: 26 | Performed by: STUDENT IN AN ORGANIZED HEALTH CARE EDUCATION/TRAINING PROGRAM

## 2025-02-16 PROCEDURE — 250N000025 HC SEVOFLURANE, PER MIN: Performed by: STUDENT IN AN ORGANIZED HEALTH CARE EDUCATION/TRAINING PROGRAM

## 2025-02-16 PROCEDURE — C1769 GUIDE WIRE: HCPCS | Performed by: STUDENT IN AN ORGANIZED HEALTH CARE EDUCATION/TRAINING PROGRAM

## 2025-02-16 PROCEDURE — 99254 IP/OBS CNSLTJ NEW/EST MOD 60: CPT | Mod: 25 | Performed by: STUDENT IN AN ORGANIZED HEALTH CARE EDUCATION/TRAINING PROGRAM

## 2025-02-16 PROCEDURE — 250N000009 HC RX 250: Performed by: STUDENT IN AN ORGANIZED HEALTH CARE EDUCATION/TRAINING PROGRAM

## 2025-02-16 PROCEDURE — 85025 COMPLETE CBC W/AUTO DIFF WBC: CPT | Performed by: EMERGENCY MEDICINE

## 2025-02-16 PROCEDURE — 96374 THER/PROPH/DIAG INJ IV PUSH: CPT | Mod: XU

## 2025-02-16 PROCEDURE — C2617 STENT, NON-COR, TEM W/O DEL: HCPCS | Performed by: STUDENT IN AN ORGANIZED HEALTH CARE EDUCATION/TRAINING PROGRAM

## 2025-02-16 PROCEDURE — 258N000003 HC RX IP 258 OP 636: Performed by: STUDENT IN AN ORGANIZED HEALTH CARE EDUCATION/TRAINING PROGRAM

## 2025-02-16 PROCEDURE — C1758 CATHETER, URETERAL: HCPCS | Performed by: STUDENT IN AN ORGANIZED HEALTH CARE EDUCATION/TRAINING PROGRAM

## 2025-02-16 PROCEDURE — 96361 HYDRATE IV INFUSION ADD-ON: CPT | Mod: XU

## 2025-02-16 PROCEDURE — 250N000011 HC RX IP 250 OP 636: Performed by: EMERGENCY MEDICINE

## 2025-02-16 PROCEDURE — 258N000001 HC RX 258: Performed by: STUDENT IN AN ORGANIZED HEALTH CARE EDUCATION/TRAINING PROGRAM

## 2025-02-16 PROCEDURE — 82365 CALCULUS SPECTROSCOPY: CPT | Performed by: STUDENT IN AN ORGANIZED HEALTH CARE EDUCATION/TRAINING PROGRAM

## 2025-02-16 PROCEDURE — 81003 URINALYSIS AUTO W/O SCOPE: CPT | Performed by: EMERGENCY MEDICINE

## 2025-02-16 PROCEDURE — 272N000001 HC OR GENERAL SUPPLY STERILE: Performed by: STUDENT IN AN ORGANIZED HEALTH CARE EDUCATION/TRAINING PROGRAM

## 2025-02-16 PROCEDURE — 360N000084 HC SURGERY LEVEL 4 W/ FLUORO, PER MIN: Performed by: STUDENT IN AN ORGANIZED HEALTH CARE EDUCATION/TRAINING PROGRAM

## 2025-02-16 PROCEDURE — 96376 TX/PRO/DX INJ SAME DRUG ADON: CPT

## 2025-02-16 PROCEDURE — 250N000009 HC RX 250: Performed by: NURSE ANESTHETIST, CERTIFIED REGISTERED

## 2025-02-16 PROCEDURE — 52356 CYSTO/URETERO W/LITHOTRIPSY: CPT | Mod: LT | Performed by: STUDENT IN AN ORGANIZED HEALTH CARE EDUCATION/TRAINING PROGRAM

## 2025-02-16 PROCEDURE — 258N000003 HC RX IP 258 OP 636: Performed by: EMERGENCY MEDICINE

## 2025-02-16 PROCEDURE — 710N000009 HC RECOVERY PHASE 1, LEVEL 1, PER MIN: Performed by: STUDENT IN AN ORGANIZED HEALTH CARE EDUCATION/TRAINING PROGRAM

## 2025-02-16 PROCEDURE — 250N000011 HC RX IP 250 OP 636: Performed by: NURSE ANESTHETIST, CERTIFIED REGISTERED

## 2025-02-16 PROCEDURE — 36415 COLL VENOUS BLD VENIPUNCTURE: CPT | Performed by: EMERGENCY MEDICINE

## 2025-02-16 PROCEDURE — 250N000013 HC RX MED GY IP 250 OP 250 PS 637: Performed by: STUDENT IN AN ORGANIZED HEALTH CARE EDUCATION/TRAINING PROGRAM

## 2025-02-16 PROCEDURE — 258N000003 HC RX IP 258 OP 636: Performed by: NURSE PRACTITIONER

## 2025-02-16 PROCEDURE — 99285 EMERGENCY DEPT VISIT HI MDM: CPT | Mod: 25

## 2025-02-16 PROCEDURE — 999N000141 HC STATISTIC PRE-PROCEDURE NURSING ASSESSMENT: Performed by: STUDENT IN AN ORGANIZED HEALTH CARE EDUCATION/TRAINING PROGRAM

## 2025-02-16 PROCEDURE — 74177 CT ABD & PELVIS W/CONTRAST: CPT

## 2025-02-16 PROCEDURE — 96375 TX/PRO/DX INJ NEW DRUG ADDON: CPT | Mod: XU

## 2025-02-16 PROCEDURE — 255N000002 HC RX 255 OP 636: Performed by: STUDENT IN AN ORGANIZED HEALTH CARE EDUCATION/TRAINING PROGRAM

## 2025-02-16 PROCEDURE — 999N000179 XR SURGERY CARM FLUORO LESS THAN 5 MIN W STILLS

## 2025-02-16 PROCEDURE — G0378 HOSPITAL OBSERVATION PER HR: HCPCS

## 2025-02-16 PROCEDURE — C1894 INTRO/SHEATH, NON-LASER: HCPCS | Performed by: STUDENT IN AN ORGANIZED HEALTH CARE EDUCATION/TRAINING PROGRAM

## 2025-02-16 PROCEDURE — 80048 BASIC METABOLIC PNL TOTAL CA: CPT | Performed by: EMERGENCY MEDICINE

## 2025-02-16 PROCEDURE — 99223 1ST HOSP IP/OBS HIGH 75: CPT | Performed by: NURSE PRACTITIONER

## 2025-02-16 PROCEDURE — 370N000017 HC ANESTHESIA TECHNICAL FEE, PER MIN: Performed by: STUDENT IN AN ORGANIZED HEALTH CARE EDUCATION/TRAINING PROGRAM

## 2025-02-16 DEVICE — STENT URETERAL POLARIS ULTRA 6FRX26CM M0061921330
Type: IMPLANTABLE DEVICE | Site: URETER | Status: NON-FUNCTIONAL
Removed: 2025-02-27

## 2025-02-16 RX ORDER — ONDANSETRON 2 MG/ML
4 INJECTION INTRAMUSCULAR; INTRAVENOUS EVERY 30 MIN PRN
Status: DISCONTINUED | OUTPATIENT
Start: 2025-02-16 | End: 2025-02-16 | Stop reason: HOSPADM

## 2025-02-16 RX ORDER — DEXAMETHASONE SODIUM PHOSPHATE 4 MG/ML
4 INJECTION, SOLUTION INTRA-ARTICULAR; INTRALESIONAL; INTRAMUSCULAR; INTRAVENOUS; SOFT TISSUE
Status: DISCONTINUED | OUTPATIENT
Start: 2025-02-16 | End: 2025-02-16 | Stop reason: HOSPADM

## 2025-02-16 RX ORDER — PROCHLORPERAZINE MALEATE 5 MG/1
10 TABLET ORAL EVERY 6 HOURS PRN
Status: DISCONTINUED | OUTPATIENT
Start: 2025-02-16 | End: 2025-02-17 | Stop reason: HOSPADM

## 2025-02-16 RX ORDER — ACETAMINOPHEN 650 MG/1
650 SUPPOSITORY RECTAL EVERY 4 HOURS PRN
Status: DISCONTINUED | OUTPATIENT
Start: 2025-02-16 | End: 2025-02-17 | Stop reason: HOSPADM

## 2025-02-16 RX ORDER — AMOXICILLIN 250 MG
2 CAPSULE ORAL 2 TIMES DAILY PRN
Status: DISCONTINUED | OUTPATIENT
Start: 2025-02-16 | End: 2025-02-17 | Stop reason: HOSPADM

## 2025-02-16 RX ORDER — PROPOFOL 10 MG/ML
INJECTION, EMULSION INTRAVENOUS PRN
Status: DISCONTINUED | OUTPATIENT
Start: 2025-02-16 | End: 2025-02-16

## 2025-02-16 RX ORDER — HYDROMORPHONE HYDROCHLORIDE 1 MG/ML
0.5 INJECTION, SOLUTION INTRAMUSCULAR; INTRAVENOUS; SUBCUTANEOUS
Status: COMPLETED | OUTPATIENT
Start: 2025-02-16 | End: 2025-02-16

## 2025-02-16 RX ORDER — CEFAZOLIN SODIUM 2 G/100ML
2 INJECTION, SOLUTION INTRAVENOUS
Status: DISCONTINUED | OUTPATIENT
Start: 2025-02-16 | End: 2025-02-16 | Stop reason: HOSPADM

## 2025-02-16 RX ORDER — NALOXONE HYDROCHLORIDE 0.4 MG/ML
0.1 INJECTION, SOLUTION INTRAMUSCULAR; INTRAVENOUS; SUBCUTANEOUS
Status: DISCONTINUED | OUTPATIENT
Start: 2025-02-16 | End: 2025-02-16 | Stop reason: HOSPADM

## 2025-02-16 RX ORDER — ONDANSETRON 2 MG/ML
INJECTION INTRAMUSCULAR; INTRAVENOUS PRN
Status: DISCONTINUED | OUTPATIENT
Start: 2025-02-16 | End: 2025-02-16

## 2025-02-16 RX ORDER — CEFAZOLIN SODIUM 2 G/100ML
2 INJECTION, SOLUTION INTRAVENOUS SEE ADMIN INSTRUCTIONS
Status: DISCONTINUED | OUTPATIENT
Start: 2025-02-16 | End: 2025-02-16 | Stop reason: HOSPADM

## 2025-02-16 RX ORDER — HALOPERIDOL 5 MG/ML
2.5 INJECTION INTRAMUSCULAR ONCE
Status: COMPLETED | OUTPATIENT
Start: 2025-02-16 | End: 2025-02-16

## 2025-02-16 RX ORDER — ACETAMINOPHEN 650 MG/1
650 SUPPOSITORY RECTAL ONCE
Status: COMPLETED | OUTPATIENT
Start: 2025-02-16 | End: 2025-02-16

## 2025-02-16 RX ORDER — AMOXICILLIN 250 MG
1 CAPSULE ORAL 2 TIMES DAILY PRN
Status: DISCONTINUED | OUTPATIENT
Start: 2025-02-16 | End: 2025-02-17 | Stop reason: HOSPADM

## 2025-02-16 RX ORDER — FENTANYL CITRATE 50 UG/ML
25 INJECTION, SOLUTION INTRAMUSCULAR; INTRAVENOUS EVERY 5 MIN PRN
Status: DISCONTINUED | OUTPATIENT
Start: 2025-02-16 | End: 2025-02-16 | Stop reason: HOSPADM

## 2025-02-16 RX ORDER — ONDANSETRON 2 MG/ML
4 INJECTION INTRAMUSCULAR; INTRAVENOUS ONCE
Status: COMPLETED | OUTPATIENT
Start: 2025-02-16 | End: 2025-02-16

## 2025-02-16 RX ORDER — FENTANYL CITRATE 50 UG/ML
50 INJECTION, SOLUTION INTRAMUSCULAR; INTRAVENOUS EVERY 5 MIN PRN
Status: DISCONTINUED | OUTPATIENT
Start: 2025-02-16 | End: 2025-02-16 | Stop reason: HOSPADM

## 2025-02-16 RX ORDER — IOPAMIDOL 755 MG/ML
500 INJECTION, SOLUTION INTRAVASCULAR ONCE
Status: COMPLETED | OUTPATIENT
Start: 2025-02-16 | End: 2025-02-16

## 2025-02-16 RX ORDER — SODIUM CHLORIDE, SODIUM LACTATE, POTASSIUM CHLORIDE, CALCIUM CHLORIDE 600; 310; 30; 20 MG/100ML; MG/100ML; MG/100ML; MG/100ML
INJECTION, SOLUTION INTRAVENOUS CONTINUOUS
Status: DISCONTINUED | OUTPATIENT
Start: 2025-02-16 | End: 2025-02-17 | Stop reason: HOSPADM

## 2025-02-16 RX ORDER — MORPHINE SULFATE 4 MG/ML
6 INJECTION, SOLUTION INTRAMUSCULAR; INTRAVENOUS ONCE
Status: COMPLETED | OUTPATIENT
Start: 2025-02-16 | End: 2025-02-16

## 2025-02-16 RX ORDER — NALOXONE HYDROCHLORIDE 0.4 MG/ML
0.4 INJECTION, SOLUTION INTRAMUSCULAR; INTRAVENOUS; SUBCUTANEOUS
Status: DISCONTINUED | OUTPATIENT
Start: 2025-02-16 | End: 2025-02-17 | Stop reason: HOSPADM

## 2025-02-16 RX ORDER — DIPHENHYDRAMINE HYDROCHLORIDE 50 MG/ML
25 INJECTION, SOLUTION INTRAMUSCULAR; INTRAVENOUS ONCE
Status: COMPLETED | OUTPATIENT
Start: 2025-02-16 | End: 2025-02-16

## 2025-02-16 RX ORDER — ACETAMINOPHEN 325 MG/1
650 TABLET ORAL EVERY 4 HOURS PRN
Status: DISCONTINUED | OUTPATIENT
Start: 2025-02-16 | End: 2025-02-17 | Stop reason: HOSPADM

## 2025-02-16 RX ORDER — ONDANSETRON 4 MG/1
4 TABLET, ORALLY DISINTEGRATING ORAL EVERY 6 HOURS PRN
Status: DISCONTINUED | OUTPATIENT
Start: 2025-02-16 | End: 2025-02-17 | Stop reason: HOSPADM

## 2025-02-16 RX ORDER — MORPHINE SULFATE 2 MG/ML
1 INJECTION, SOLUTION INTRAMUSCULAR; INTRAVENOUS
Status: DISCONTINUED | OUTPATIENT
Start: 2025-02-16 | End: 2025-02-17 | Stop reason: HOSPADM

## 2025-02-16 RX ORDER — CEFAZOLIN SODIUM 1 G/3ML
INJECTION, POWDER, FOR SOLUTION INTRAMUSCULAR; INTRAVENOUS PRN
Status: DISCONTINUED | OUTPATIENT
Start: 2025-02-16 | End: 2025-02-16

## 2025-02-16 RX ORDER — MORPHINE SULFATE 2 MG/ML
2 INJECTION, SOLUTION INTRAMUSCULAR; INTRAVENOUS
Status: DISCONTINUED | OUTPATIENT
Start: 2025-02-16 | End: 2025-02-17 | Stop reason: HOSPADM

## 2025-02-16 RX ORDER — HYDROMORPHONE HCL IN WATER/PF 6 MG/30 ML
0.4 PATIENT CONTROLLED ANALGESIA SYRINGE INTRAVENOUS EVERY 5 MIN PRN
Status: DISCONTINUED | OUTPATIENT
Start: 2025-02-16 | End: 2025-02-16 | Stop reason: HOSPADM

## 2025-02-16 RX ORDER — SODIUM CHLORIDE, SODIUM LACTATE, POTASSIUM CHLORIDE, CALCIUM CHLORIDE 600; 310; 30; 20 MG/100ML; MG/100ML; MG/100ML; MG/100ML
INJECTION, SOLUTION INTRAVENOUS CONTINUOUS
Status: DISCONTINUED | OUTPATIENT
Start: 2025-02-16 | End: 2025-02-16 | Stop reason: HOSPADM

## 2025-02-16 RX ORDER — NALOXONE HYDROCHLORIDE 0.4 MG/ML
0.2 INJECTION, SOLUTION INTRAMUSCULAR; INTRAVENOUS; SUBCUTANEOUS
Status: DISCONTINUED | OUTPATIENT
Start: 2025-02-16 | End: 2025-02-17 | Stop reason: HOSPADM

## 2025-02-16 RX ORDER — OXYCODONE HYDROCHLORIDE 5 MG/1
5 TABLET ORAL EVERY 4 HOURS PRN
Status: DISCONTINUED | OUTPATIENT
Start: 2025-02-16 | End: 2025-02-17 | Stop reason: HOSPADM

## 2025-02-16 RX ORDER — HYDROMORPHONE HCL IN WATER/PF 6 MG/30 ML
0.2 PATIENT CONTROLLED ANALGESIA SYRINGE INTRAVENOUS EVERY 5 MIN PRN
Status: DISCONTINUED | OUTPATIENT
Start: 2025-02-16 | End: 2025-02-16 | Stop reason: HOSPADM

## 2025-02-16 RX ORDER — DEXAMETHASONE SODIUM PHOSPHATE 4 MG/ML
INJECTION, SOLUTION INTRA-ARTICULAR; INTRALESIONAL; INTRAMUSCULAR; INTRAVENOUS; SOFT TISSUE PRN
Status: DISCONTINUED | OUTPATIENT
Start: 2025-02-16 | End: 2025-02-16

## 2025-02-16 RX ORDER — ONDANSETRON 2 MG/ML
4 INJECTION INTRAMUSCULAR; INTRAVENOUS EVERY 6 HOURS PRN
Status: DISCONTINUED | OUTPATIENT
Start: 2025-02-16 | End: 2025-02-17 | Stop reason: HOSPADM

## 2025-02-16 RX ORDER — ACETAMINOPHEN 325 MG/1
975 TABLET ORAL ONCE
Status: COMPLETED | OUTPATIENT
Start: 2025-02-16 | End: 2025-02-16

## 2025-02-16 RX ORDER — ONDANSETRON 4 MG/1
4 TABLET, ORALLY DISINTEGRATING ORAL EVERY 30 MIN PRN
Status: DISCONTINUED | OUTPATIENT
Start: 2025-02-16 | End: 2025-02-16 | Stop reason: HOSPADM

## 2025-02-16 RX ORDER — LIDOCAINE HYDROCHLORIDE 20 MG/ML
INJECTION, SOLUTION INFILTRATION; PERINEURAL PRN
Status: DISCONTINUED | OUTPATIENT
Start: 2025-02-16 | End: 2025-02-16

## 2025-02-16 RX ORDER — FENTANYL CITRATE 50 UG/ML
INJECTION, SOLUTION INTRAMUSCULAR; INTRAVENOUS PRN
Status: DISCONTINUED | OUTPATIENT
Start: 2025-02-16 | End: 2025-02-16

## 2025-02-16 RX ADMIN — LIDOCAINE HYDROCHLORIDE 40 MG: 20 INJECTION, SOLUTION INFILTRATION; PERINEURAL at 15:08

## 2025-02-16 RX ADMIN — HYDROMORPHONE HYDROCHLORIDE 0.5 MG: 1 INJECTION, SOLUTION INTRAMUSCULAR; INTRAVENOUS; SUBCUTANEOUS at 10:35

## 2025-02-16 RX ADMIN — SODIUM CHLORIDE 1000 ML: 9 INJECTION, SOLUTION INTRAVENOUS at 08:22

## 2025-02-16 RX ADMIN — ACETAMINOPHEN 975 MG: 325 TABLET, FILM COATED ORAL at 14:44

## 2025-02-16 RX ADMIN — IOPAMIDOL 100 ML: 755 INJECTION, SOLUTION INTRAVENOUS at 09:47

## 2025-02-16 RX ADMIN — MORPHINE SULFATE 6 MG: 4 INJECTION, SOLUTION INTRAMUSCULAR; INTRAVENOUS at 10:57

## 2025-02-16 RX ADMIN — ONDANSETRON 4 MG: 2 INJECTION, SOLUTION INTRAMUSCULAR; INTRAVENOUS at 08:23

## 2025-02-16 RX ADMIN — HALOPERIDOL LACTATE 2.5 MG: 5 INJECTION, SOLUTION INTRAMUSCULAR at 10:57

## 2025-02-16 RX ADMIN — MIDAZOLAM 2 MG: 1 INJECTION INTRAMUSCULAR; INTRAVENOUS at 15:03

## 2025-02-16 RX ADMIN — DEXAMETHASONE SODIUM PHOSPHATE 4 MG: 4 INJECTION, SOLUTION INTRA-ARTICULAR; INTRALESIONAL; INTRAMUSCULAR; INTRAVENOUS; SOFT TISSUE at 15:08

## 2025-02-16 RX ADMIN — DIPHENHYDRAMINE HYDROCHLORIDE 25 MG: 50 INJECTION, SOLUTION INTRAMUSCULAR; INTRAVENOUS at 10:57

## 2025-02-16 RX ADMIN — SODIUM CHLORIDE, POTASSIUM CHLORIDE, SODIUM LACTATE AND CALCIUM CHLORIDE: 600; 310; 30; 20 INJECTION, SOLUTION INTRAVENOUS at 15:03

## 2025-02-16 RX ADMIN — HYDROMORPHONE HYDROCHLORIDE 0.5 MG: 1 INJECTION, SOLUTION INTRAMUSCULAR; INTRAVENOUS; SUBCUTANEOUS at 10:05

## 2025-02-16 RX ADMIN — ONDANSETRON 4 MG: 2 INJECTION INTRAMUSCULAR; INTRAVENOUS at 15:16

## 2025-02-16 RX ADMIN — SODIUM CHLORIDE, POTASSIUM CHLORIDE, SODIUM LACTATE AND CALCIUM CHLORIDE: 600; 310; 30; 20 INJECTION, SOLUTION INTRAVENOUS at 22:24

## 2025-02-16 RX ADMIN — PROPOFOL 200 MG: 10 INJECTION, EMULSION INTRAVENOUS at 15:08

## 2025-02-16 RX ADMIN — FENTANYL CITRATE 100 MCG: 50 INJECTION INTRAMUSCULAR; INTRAVENOUS at 15:08

## 2025-02-16 RX ADMIN — CEFAZOLIN 2 G: 1 INJECTION, POWDER, FOR SOLUTION INTRAMUSCULAR; INTRAVENOUS at 15:03

## 2025-02-16 RX ADMIN — SODIUM CHLORIDE, POTASSIUM CHLORIDE, SODIUM LACTATE AND CALCIUM CHLORIDE: 600; 310; 30; 20 INJECTION, SOLUTION INTRAVENOUS at 13:01

## 2025-02-16 RX ADMIN — HYDROMORPHONE HYDROCHLORIDE 0.5 MG: 1 INJECTION, SOLUTION INTRAMUSCULAR; INTRAVENOUS; SUBCUTANEOUS at 08:23

## 2025-02-16 ASSESSMENT — ACTIVITIES OF DAILY LIVING (ADL)
ADLS_ACUITY_SCORE: 41
ADLS_ACUITY_SCORE: 43
ADLS_ACUITY_SCORE: 41
ADLS_ACUITY_SCORE: 43
ADLS_ACUITY_SCORE: 41

## 2025-02-16 NOTE — OP NOTE
OPERATIVE REPORT    PREOPERATIVE DIAGNOSIS:  Left-sided ureteral stone    POSTOPERATIVE DIAGNOSIS:  Same    PROCEDURES PERFORMED:   1. Cystourethroscopy  2. Left ureteroscopy  3. Left retrograde pyelogram with interpretation of intraoperative fluoroscopic imaging  4. Holmium laser lithotripsy with basket stone extraction  5. Left ureteral stent placement    STAFF SURGEON: Xena Nj MD    ANESTHESIA: General    ESTIMATED BLOOD LOSS: 1 cc  DRAINS/TUBES: 6 Ethiopian x 26 cm double-J ureteral stent    IV FLUIDS: Please see dictated anesthesia record  COMPLICATIONS: None.   SPECIMEN: Stones for analysis  SIGNIFICANT FINDINGS:   1. Patient is stone free in left upper tract  2. Proximal curl of the ureteral stent seen in the renal pelvis under fluoroscopy and distal curl seen in the bladder under direct vision.     BRIEF OPERATIVE INDICATIONS: Benedict De Anda is a 64 year old male who presented with left flank pain and was found to have a 6 mm obstructing left ureteral stone. He was also incidentally found to have a 5 cm left lower pole renal mass suspicious for RCC.  After a discussion of all risks, benefits, and alternatives, the patient elected to proceed with definitive stone management. The patient understands the potential need for more than one procedure to eliminate all stone burden.     A 22-Ethiopian rigid cystoscope was inserted into a well-lubricated urethra. The urethra was unremarkable without stricture. The prostate exhibited moderate bilateral lobar hypertrophy     Flexible URS  A dual lumen catheter was used to establish access with a second, Super Stiff guide wire. The previous sensor wire was then clamped to the drape to act as our safety wire. A retrograde pyelogram was performed to serve as a roadmap. A 46 cm 11/13 ureteral access sheath was advanced up to the distal ureter. The flexible ureteroscope was used to identify the stone(s). A 200 micron laser fiber was used at a setting of 0.6 J and 6 Hz and  lithotripsy was performed. A Halo basket was used to remove all fragments greater than 1 mm.  Pullback ureteroscopy was performed and showed no retained stone fragments or significant ureteral injury    A 6Fr x 26-cm double-J stent was advanced over the Sensor wire, and a good proximal curl was seen in the renal pelvis on fluoroscopy and the distal curl was seen in the bladder. The bladder was drained.    The patient tolerated the procedure well and there were no apparent complications. The patient  was transported to the postanesthesia care unit in stable condition.     POSTOP PLAN:  - Stent removal in clinic on 3/2  - Tamsulosin and pyridium for stent-related discomfort  - Chest CT (w/ contrast) for completion of renal mass staging workup  - Referral to one of my partners for partial nephrectomy    Urology will continue to follow.    Xena Nj MD  Reconstructive Urology  [facility]

## 2025-02-16 NOTE — ED TRIAGE NOTES
Pt comes from home. Has constant back pain, coming up from left hip & radiating to left rib. Says this happened to him about three weeks ago. VSS. A&O.

## 2025-02-16 NOTE — ANESTHESIA POSTPROCEDURE EVALUATION
Patient: Benedict De Anda    Procedure: Procedure(s):  Cystoureteroscopy, left retrograde pyelogram, holmium laser lithotripsy of left ureteral calculus and left ureteral stent insertion       Anesthesia Type:  General    Note:  Disposition: Inpatient   Postop Pain Control: Uneventful            Sign Out: Well controlled pain   PONV: No   Neuro/Psych: Uneventful            Sign Out: Acceptable/Baseline neuro status   Airway/Respiratory: Uneventful            Sign Out: Acceptable/Baseline resp. status   CV/Hemodynamics: Uneventful            Sign Out: Acceptable CV status; No obvious hypovolemia; No obvious fluid overload   Other NRE: NONE   DID A NON-ROUTINE EVENT OCCUR? No           Last vitals:  Vitals Value Taken Time   /73 02/16/25 1655   Temp 97.3  F (36.3  C) 02/16/25 1625   Pulse 54 02/16/25 1700   Resp 29 02/16/25 1700   SpO2 95 % 02/16/25 1700   Vitals shown include unfiled device data.    Electronically Signed By: Brendon Hernández MD  February 16, 2025  5:01 PM

## 2025-02-16 NOTE — ANESTHESIA PROCEDURE NOTES
Airway       Patient location during procedure: OR  Staff -        CRNA: Stevenson Elena APRN CRNA       Performed By: CRNA  Consent for Airway        Urgency: elective  Indications and Patient Condition       Indications for airway management: ethan-procedural       Induction type:intravenous       Mask difficulty assessment: 1 - vent by mask    Final Airway Details       Final airway type: supraglottic airway    Supraglottic Airway Details        Type: LMA       Brand: I-Gel       LMA size: 5    Post intubation assessment        Placement verified by: capnometry, equal breath sounds and chest rise        Number of attempts at approach: 1       Secured with: commercial tube lizarraga       Ease of procedure: easy       Dentition: Intact and Unchanged

## 2025-02-16 NOTE — CONSULTS
Urology Consult    Name: Benedict De Anda    MRN: [id number]   YOB: 1960               Chief Complaint:   :eft flank pain    History is obtained from the patient and chart review          History of Present Illness:   Benedict De Anda is a 64 year old male with no prior urologic history who presented to the ER with left flank pain.     His vitals were normal, no fever/chills, WBC 11.6, Cr 1.6 (baseline 1.1), UA without evidence of infection.    On CT he appears to have a 6 mm left mid ureteral stone. Incidentally he was also found to have a 5 mm exophytic left lower pole renal mass- suggestive of RCC. Urology was consulted for further recommendations.      His pain is somewhat better controlled at this moment but he is still uncomfortable.          Past Medical History:     Past Medical History:   Diagnosis Date    Allergic rhinitis, cause unspecified 8/22/2005    testing 2006    Herpes simplex without mention of complication 8/22/2005            Past Surgical History:     Past Surgical History:   Procedure Laterality Date    HC KNEE SCOPE, W/LATERAL RELEASE  1992    knee spur            Social History:     Social History     Tobacco Use    Smoking status: Former     Passive exposure: Past    Smokeless tobacco: Never    Tobacco comments:     stopped age 26   Substance Use Topics    Alcohol use: Yes     Alcohol/week: 4.0 standard drinks of alcohol     Types: 4 Standard drinks or equivalent per week            Family History:     Family History   Problem Relation Age of Onset    C.A.D. Father         bypass    Hypertension Father     Lipids Father     Diabetes No family hx of             Allergies:     Allergies   Allergen Reactions    Amoxicillin Hives            Medications:     No current facility-administered medications for this encounter.     Current Outpatient Medications   Medication Sig Dispense Refill    sildenafil (VIAGRA) 100 MG tablet Take 1 tablet (100 mg) by mouth daily as needed (for  ED). 30 tablet 0    valACYclovir (VALTREX) 500 MG tablet Take 1 tablet (500 mg) by mouth daily. 90 tablet 3             Review of Systems:    ROS: 10 point ROS neg other than the symptoms noted above in the HPI           Physical Exam:   VS:  T: 97.8    HR: 68    BP: 158/77    RR: 18   GEN:  AOx3.  NAD.    CV:  RRR  LUNGS: Non-labored breathing.   BACK:  No midline or CVA tenderness.  ABD:  Soft.  NT.  ND.  No rebound or guarding.  No masses.  EXT:  Warm, well perfused.  No edema.  SKIN:  Warm.  Dry.  No rashes.  NEURO:  CN grossly intact.            Data:   All laboratory data reviewed:    Recent Labs   Lab 02/16/25  0827   WBC 11.6*   HGB 15.0          Recent Labs   Lab 02/16/25  0827      POTASSIUM 4.4   CHLORIDE 107   CO2 22   BUN 28.6*   CR 1.60*   *   EMMA 9.5       Recent Labs   Lab 02/16/25  1038   COLOR Straw   APPEARANCE Clear   URINEGLC Negative   URINEBILI Negative   URINEKETONE Trace*   SG 1.010   URINEPH 6.0   PROTEIN Negative   NITRITE Negative   LEUKEST Negative   RBCU 72*   WBCU 2       All pertinent imaging reviewed:    CT scan of the abdomen:   6 mm left mid-ureteral stone  5 cm left lower pole endo/exophytic mass suggestive of RCC. No convincing evidence of metastatic disease. He does have a few mixed lytic/sclerotic lesions on his left pubic bone/bilateral pubic rami which are nonspecific.                   Impression and Plan:   Impression:   Benedict De Anda is a 64 year old male with no prior urologic history who presented to the ER with left flank pain, found to have a left ureteral stone with hydronephrosis (no evidence of infection) and incidentally found to have a left renal mass suggestive of RCC. No convincing evidence of metastatic disease.     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    PLAN:  Regarding his stone discussed options of MET vs. Intervention. He does not appear to have an infection, therefore I  would plan to attempt primary URS although he understands that there is a chance I will be unable to access the stone today and he may get a stent only. I will plan to leave a stnt post-procedure regardless.    He opts for surgical intervention this afternoon. Please keep NPO.    Regarding his renal mass, this is likely amenable to partial nephrectomy. I explained the procedure to the patient and his family today along with the expected recovery. I do not perform oncologic surgery, but will refer him to one of my amazing partners who perform partial nephrectomy at [facility].     He should undergo CT chest w/ contrast to complete his staging workup. I would prefer this was performed tomorrow once his Cr has improved from his stone.        Xena Nj MD  Reconstructive Urology  [facility]

## 2025-02-16 NOTE — CARE PLAN
"[facility]    ED Boarding Nurse Handoff Addendum Report:    Date/time: 2/16/2025, 1:50 PM    Activity Level: In bed    Fall Risk: No    Active Infusions: Lactated Ringers 100 mL/hr    Current Meds Due: Review MAR    Current care needs: Pain management, IVF    Oxygen requirements (liters/min and/or FiO2): None    Respiratory status: Room air    Vital signs (within last 30 minutes):    Vitals:    02/16/25 0740 02/16/25 1215 02/16/25 1248   BP: (!) 158/77  (!) 141/72   BP Location:   Right arm   Pulse: 68  60   Resp: 18 18 16   Temp: 97.8  F (36.6  C)  98.1  F (36.7  C)   TempSrc:   Oral   SpO2: 96% 95% 94%   Weight: 85.8 kg (189 lb 2.5 oz)     Height: 1.727 m (5' 8\")         BP (!) 141/72 (BP Location: Right arm)   Pulse 60   Temp 98.1  F (36.7  C) (Oral)   Resp 16   Ht 1.727 m (5' 8\")   Wt 85.8 kg (189 lb 2.5 oz)   SpO2 94%   BMI 28.76 kg/m        Focused assessment within last 30 minutes:    Pt A&OX4, VSS on RA. Pain controlled with scheduled medications, see MAR. Scheduled for cystoureteroscopy this afternoon. LR running at 100 mL/hr. Wife and dtr at bedside.     ED Boarding Nurse name: Vida Cantor RN    "

## 2025-02-16 NOTE — PHARMACY-ADMISSION MEDICATION HISTORY
Pharmacy Intern Admission Medication History    Admission medication history is complete. The information provided in this note is only as accurate as the sources available at the time of the update.    Information Source(s): Patient and CareEverywhere/SureScripts via in-person    Pertinent Information: None    Changes made to PTA medication list:  Added: None  Deleted: None  Changed: None    Allergies reviewed with patient and updates made in EHR: yes    Medication History Completed By: Jared Gandhi 2/16/2025 2:53 PM    PTA Med List   Medication Sig Last Dose/Taking    sildenafil (VIAGRA) 100 MG tablet Take 1 tablet (100 mg) by mouth daily as needed (for ED). Unknown    valACYclovir (VALTREX) 500 MG tablet Take 1 tablet (500 mg) by mouth daily. 2/15/2025 Morning

## 2025-02-16 NOTE — ANESTHESIA CARE TRANSFER NOTE
Patient: Benedict De Anda    Procedure: Procedure(s):  Cystoureteroscopy, left retrograde pyelogram, holmium laser lithotripsy of left ureteral calculus and left ureteral stent insertion       Diagnosis: Ureteral stone [N20.1]  Diagnosis Additional Information: No value filed.    Anesthesia Type:   General     Note:    Oropharynx: oropharynx clear of all foreign objects  Level of Consciousness: drowsy  Oxygen Supplementation: face mask  Level of Supplemental Oxygen (L/min / FiO2): 6  Independent Airway: airway patency satisfactory and stable  Dentition: dentition unchanged  Vital Signs Stable: post-procedure vital signs reviewed and stable  Report to RN Given: handoff report given  Patient transferred to: PACU    Handoff Report: Identifed the Patient, Identified the Reponsible Provider, Reviewed the pertinent medical history, Discussed the surgical course, Reviewed Intra-OP anesthesia mangement and issues during anesthesia, Set expectations for post-procedure period and Allowed opportunity for questions and acknowledgement of understanding      Vitals:  Vitals Value Taken Time   BP     Temp     Pulse 79 02/16/25 1625   Resp 18 02/16/25 1625   SpO2 96 % 02/16/25 1625   Vitals shown include unfiled device data.    Electronically Signed By: HUAN Zamarripa CRNA  February 16, 2025  4:25 PM

## 2025-02-16 NOTE — ANESTHESIA PREPROCEDURE EVALUATION
Anesthesia Pre-Procedure Evaluation    Patient: Benedict De Anda   MRN: [id number] : 1960        Procedure : Procedure(s):  CYSTOURETEROSCOPY, WITH RETROGRADE PYELOGRAM, HOLMIUM LASER LITHOTRIPSY OF URETERAL CALCULUS, AND STENT INSERTION          Past Medical History:   Diagnosis Date    Allergic rhinitis, cause unspecified 2005    testing 2006    Herpes simplex without mention of complication 2005      Past Surgical History:   Procedure Laterality Date    HC KNEE SCOPE, W/LATERAL RELEASE      knee spur      Allergies   Allergen Reactions    Amoxicillin Hives      Social History     Tobacco Use    Smoking status: Former     Passive exposure: Past    Smokeless tobacco: Never    Tobacco comments:     stopped age 26   Substance Use Topics    Alcohol use: Yes     Alcohol/week: 4.0 standard drinks of alcohol     Types: 4 Standard drinks or equivalent per week      Wt Readings from Last 1 Encounters:   25 85.8 kg (189 lb 2.5 oz)        Anesthesia Evaluation   Pt has had prior anesthetic. Type: General.    No history of anesthetic complications       ROS/MED HX  ENT/Pulmonary:  - neg pulmonary ROS     Neurologic:  - neg neurologic ROS     Cardiovascular:  - neg cardiovascular ROS     METS/Exercise Tolerance:     Hematologic:  - neg hematologic  ROS     Musculoskeletal:  - neg musculoskeletal ROS     GI/Hepatic:  - neg GI/hepatic ROS     Renal/Genitourinary: Comment: New renal mass    (+) renal disease,      Nephrolithiasis ,       Endo:  - neg endo ROS     Psychiatric/Substance Use:  - neg psychiatric ROS     Infectious Disease:  - neg infectious disease ROS     Malignancy:       Other:            Physical Exam    Airway        Mallampati: II   TM distance: > 3 FB   Neck ROM: full   Mouth opening: > 3 cm    Respiratory Devices and Support         Dental       (+) Minor Abnormalities - some fillings, tiny chips      Cardiovascular   cardiovascular exam normal          Pulmonary   pulmonary exam  "normal                OUTSIDE LABS:  CBC:   Lab Results   Component Value Date    WBC 11.6 (H) 02/16/2025    WBC 8.7 05/06/2024    HGB 15.0 02/16/2025    HGB 15.3 05/06/2024    HCT 42.2 02/16/2025    HCT 45.3 05/06/2024     02/16/2025     05/06/2024     BMP:   Lab Results   Component Value Date     02/16/2025    .3 12/10/2024    POTASSIUM 4.4 02/16/2025    POTASSIUM 4.18 12/10/2024    CHLORIDE 107 02/16/2025    CHLORIDE 106.5 12/10/2024    CO2 22 02/16/2025    CO2 25.0 12/10/2024    BUN 28.6 (H) 02/16/2025    BUN 22 12/10/2024    BUN 20 12/10/2024    CR 1.60 (H) 02/16/2025    CR 1.10 12/10/2024     (H) 02/16/2025    GLC 94 12/10/2024     COAGS: No results found for: \"PTT\", \"INR\", \"FIBR\"  POC: No results found for: \"BGM\", \"HCG\", \"HCGS\"  HEPATIC:   Lab Results   Component Value Date    ALBUMIN 4.3 12/10/2024    PROTTOTAL 6.7 12/10/2024    ALT 22 05/06/2024    AST 31 05/06/2024    ALKPHOS 63 12/10/2024    BILITOTAL 1.5 (A) 12/10/2024     OTHER:   Lab Results   Component Value Date    EMMA 9.5 02/16/2025    LIPASE 125 (H) 05/06/2024    TSH 1.65 11/23/2009    T4 1.1 11/23/2009       Anesthesia Plan    ASA Status:  2       Anesthesia Type: General.     - Airway: LMA   Induction: Intravenous.   Maintenance: Balanced.        Consents    Anesthesia Plan(s) and associated risks, benefits, and realistic alternatives discussed. Questions answered and patient/representative(s) expressed understanding.     - Discussed:     - Discussed with:  Patient      - Extended Intubation/Ventilatory Support Discussed: No.      - Patient is DNR/DNI Status: No     Use of blood products discussed: No .     Postoperative Care    Pain management: IV analgesics, Oral pain medications, Multi-modal analgesia.   PONV prophylaxis: Ondansetron (or other 5HT-3), Dexamethasone or Solumedrol     Comments:               Brendon Hernández MD    I have reviewed the pertinent notes and labs in the chart from the past 30 days " "and (re)examined the patient.  Any updates or changes from those notes are reflected in this note.    Clinically Significant Risk Factors Present on Admission                             # Overweight: Estimated body mass index is 28.76 kg/m  as calculated from the following:    Height as of this encounter: 1.727 m (5' 8\").    Weight as of this encounter: 85.8 kg (189 lb 2.5 oz).                "

## 2025-02-16 NOTE — H&P
[facility]    History and Physical - Hospitalist Service       Date of Admission:  2/16/2025    Assessment & Plan      Benedict De Anda is a 64 year old male with known medical history of HSV, depression and allergic rhinitis admitted on 2/16/2025.     Patient presents with acute onset left lower back, flank, suprapubic pain 9/10 that started on 2/15/2025 with associated nausea and vomiting x 2.  He did had a similar episode about 3 weeks ago which resolved on its own.  He has been taking ibuprofen for pain control at home which did not help.  No personal or family history of cancer.    Patient denies diarrhea. Denies cough, chest pain, or shortness of breath and no reported headaches or vision changes. Denies any urinary symptoms of dysuria or frequency. Patient reports no recent long distance travel, camping or sick changes.     ED course: /77. Labs with Cr 1.60, WBC 11.6, UA with WBC 2, RBC 72, neg for nitrite. CT abdomen pelvis with Left hydronephrosis secondary to a 6 x 4 x 4 mm calculus proximal left ureter at the L3-L4 level. Punctate nonobstructing calculus left lower pole calyx, 5 x 4.5 x 4.5 cm heterogeneously enhancing mass arising from the posterior cortex of the mid left kidney, compatible with a renal cell carcinoma. At least 3 mixed lytic/sclerotic bony exostoses arising from the left pubic bone and bilateral superior pubic rami.     Left-sided kidney stone with hydronephrosis  Assessment: Acute onset left lower back pain 9/1 radiating to left rib, and suprapubic area.  CT abdomen pelvis with left hydronephrosis secondary to 6 x 4 x 4 mm calculus proximal left ureter at the L3-L4 level.  Plan:  -Observation  -Supportive care with IV fluid hydration, analgesics, antiemetics needed  -Urology consult, NPO pending urology recommendation   - CT chest w/ contrast to complete staging workup per Urology pending Cr in AM   - Possible Partial nephrectomy with Oncologic surgery  "referral  -Defer IV antibiotics given UA non acute and afebrile    New left kidney mass  Assessment: CT abdomen pelvis done on 2/16/2025 showed 5 x 4.5 x 4.5 cm heterogeneously enhancing mass arising from the posterior cortex of the mid left kidney, compatible with renal cell carcinoma.  At least 6 mixed lytic/sclerotic bony exostosis arising from the left pubic bone and bilateral superior pubic rami.  Patient and family aware of the new kidney mass.  Plan:  -Urology consult  -Will need biopsy, and possible oncology consult pending biopsy result    Acute Kidney Injury  Assessment: Cr 1.60 with baseline around 1.10. Likely due to nausea and vomiting and kidney stone.   Plan:   - IVF hydration and recheck in AM    Leukocytosis  Assessment: WBC 11.6 which is likely due to dehydration. UA negative for nitrite and WBC 2.  Plan:   - Defer IV Abx for now and recheck WBC in AM    Diet:  NPO pending urology intervention  DVT Prophylaxis: Low Risk/Ambulatory with no VTE prophylaxis indicated  Code Status:  Full code    Clinically Significant Risk Factors Present on Admission                             # Overweight: Estimated body mass index is 28.76 kg/m  as calculated from the following:    Height as of this encounter: 1.727 m (5' 8\").    Weight as of this encounter: 85.8 kg (189 lb 2.5 oz).              Disposition Plan     Medically Ready for Discharge: Anticipated Tomorrow    The patient's care was discussed with the Attending Physician, Dr. Jaimes and Patient.    HUAN Mcclain [facility]  Hospitalist Service  [facility]  Securely message with [phone] (more info)  Text page via [facility] Paging/Directory     ______________________________________________________________________    Chief Complaint   Back pain, hip pain and rib pain    History is obtained from the patient    History of Present Illness     Benedict De Anda is a 64 year old male with known medical history of HSV, depression and allergic " rhinitis admitted on 2/16/2025.     Patient presents with acute onset left lower back, flank, suprapubic pain 9/10 that started on 2/15/2025 with associated nausea and vomiting x 2.  He did had a similar episode about 3 weeks ago which resolved on its own.  He has been taking ibuprofen for pain control at home which did not help.  No personal or family history of cancer.    Patient denies diarrhea. Denies cough, chest pain, or shortness of breath and no reported headaches or vision changes. Denies any urinary symptoms of dysuria or frequency. Patient reports no recent long distance travel, camping or sick changes.     ED course: /77. Labs with Cr 1.60, WBC 11.6, UA with WBC 2, RBC 72, neg for nitrite. CT abdomen pelvis with Left hydronephrosis secondary to a 6 x 4 x 4 mm calculus proximal left ureter at the L3-L4 level. Punctate nonobstructing calculus left lower pole calyx, 5 x 4.5 x 4.5 cm heterogeneously enhancing mass arising from the posterior cortex of the mid left kidney, compatible with a renal cell carcinoma. At least 3 mixed lytic/sclerotic bony exostoses arising from the left pubic bone and bilateral superior pubic rami.     Past Medical History    Past Medical History:   Diagnosis Date    Allergic rhinitis, cause unspecified 8/22/2005    testing 2006    Herpes simplex without mention of complication 8/22/2005       Past Surgical History   Past Surgical History:   Procedure Laterality Date     KNEE SCOPE, W/LATERAL RELEASE  1992    knee spur       Prior to Admission Medications   Prior to Admission Medications   Prescriptions Last Dose Informant Patient Reported? Taking?   sildenafil (VIAGRA) 100 MG tablet   No No   Sig: Take 1 tablet (100 mg) by mouth daily as needed (for ED).   valACYclovir (VALTREX) 500 MG tablet   No No   Sig: Take 1 tablet (500 mg) by mouth daily.      Facility-Administered Medications: None        Review of Systems    The 10 point Review of Systems is negative other than  noted in the HPI or here.     Social History   I have reviewed this patient's social history and updated it with pertinent information if needed.  Social History     Tobacco Use    Smoking status: Former     Passive exposure: Past    Smokeless tobacco: Never    Tobacco comments:     stopped age 26   Substance Use Topics    Alcohol use: Yes     Alcohol/week: 4.0 standard drinks of alcohol     Types: 4 Standard drinks or equivalent per week    Drug use: Not Currently     Types: Marijuana     Comment: occasional         Family History   I have reviewed this patient's family history and updated it with pertinent information if needed.  Family History   Problem Relation Age of Onset    C.A.D. Father         bypass    Hypertension Father     Lipids Father     Diabetes No family hx of          Allergies   Allergies   Allergen Reactions    Amoxicillin Hives        Physical Exam   Vital Signs: Temp: 97.8  F (36.6  C)   BP: (!) 158/77 Pulse: 68   Resp: 18 SpO2: 96 % O2 Device: None (Room air)    Weight: 189 lbs 2.47 oz    General: NAD, well nourished, well developed, good hygiene, in no acute distress, alert and oriented X3  HEENT: Atraumatic, normocephalic, No scleral icterus or ptosis. No nasal discharges. Normal hearing. External ears are normal. Moist mucus membranes. No oral lesions.   Neck: No JVD, thyromegaly, lymphadenopathy, or bruits.   Cardio: RRR, positive S1, S2, no M/R/G.   Lungs: CTAB, no W/R/R, equal expansion, no labored breathing, no respiratory distress,  no increased WOB, stridor, pursed lip or accessory muscle use, talking in full sentences  Abdomen: NT, + BS x4.  No masses, organomegaly, guarding or rebound tenderness  Extremities: Warm and well perfused.  No edema, cyanosis, clubbing  Back: Mild CVA tenderness L>R  Skin: Visualized skin is within normal limits, no rashes, ulcerations or petechiae  Pulses: Dorsalis pedis, posterior tibial: 2+ easily palpable.  Good capillary refill  Psychiatry: Normal  affect, normal speech, judgment and insight are normal  Neuro: No focal deficits.  Motor strength is appropriate for age      Medical Decision Making       75 MINUTES SPENT BY ME on the date of service doing chart review, history, exam, documentation & further activities per the note.      Data     I have personally reviewed the following data over the past 24 hrs:    11.6 (H)  \   15.0   / 189     140 107 28.6 (H) /  111 (H)   4.4 22 1.60 (H) \       Imaging results reviewed over the past 24 hrs:   Recent Results (from the past 24 hours)   CT Abdomen Pelvis w Contrast    Narrative    EXAM: CT ABDOMEN PELVIS W CONTRAST  LOCATION: [facility]  DATE: 2/16/2025    INDICATION: Left flank pain.  COMPARISON: None.  TECHNIQUE: CT scan of the abdomen and pelvis was performed following injection of IV contrast. Multiplanar reformats were obtained. Dose reduction techniques were used.  CONTRAST: 100mL Isovue 370    FINDINGS:   LOWER CHEST: Bibasilar subsegmental atelectasis versus scar.    HEPATOBILIARY: Normal.    PANCREAS: Normal.    SPLEEN: Normal.    ADRENAL GLANDS: Normal.    KIDNEYS/BLADDER: Left hydronephrosis secondary to a 6 x 4 x 4 mm calculus proximal left ureter at the L3-L4 level. Punctate calculus within left lower pole calyx. No right-sided urinary tract calculus. 5 x 4.5 x 4.5 cm heterogeneously enhancing cortical   mass arising from posterior mid left renal cortex. Left renal veins are patent. Tiny hypoattenuating cortical lesions right kidney are too small to characterize with imaging. The right ureter and bladder are negative.    BOWEL: Diverticulosis of the colon. No acute inflammatory change. No obstruction.     LYMPH NODES: No lymphadenopathy.    VASCULATURE: No aortoiliac aneurysm.    PELVIC ORGANS: Normal.    MUSCULOSKELETAL: At least 3 mixed lytic/sclerotic bony exostoses arising from the left pubic bone and bilateral superior pubic rami. Findings could represent multiple  osteochondromas, but are technically indeterminate in setting of large left renal mass.      Impression    IMPRESSION:   1.  Left hydronephrosis secondary to a 6 x 4 x 4 mm calculus proximal left ureter at the L3-L4 level. Punctate nonobstructing calculus left lower pole calyx. No right-sided urinary tract calculi.  2.  5 x 4.5 x 4.5 cm heterogeneously enhancing mass arising from the posterior cortex of the mid left kidney, compatible with a renal cell carcinoma. No evidence of renal vein extension nor retroperitoneal lymphadenopathy.  3.  At least 3 mixed lytic/sclerotic bony exostoses arising from the left pubic bone and bilateral superior pubic rami. Differential considerations would include multiple osteochondromas. Osseous metastases are entirely excluded. MRI of the bony pelvis   would be recommended, if no previous comparison studies available.

## 2025-02-16 NOTE — ED PROVIDER NOTES
"  Emergency Department Note      History of Present Illness     Chief Complaint   Back Pain    HPI     Benedict De Anda is a 64 year old male who presents to the ED with relatives for evaluation of back pain. The patient reports that 3 weeks ago he had a gradual onset of sharp left flank pain above his buttock. He states the pain shoots up to below his rib cage and it wraps around to the front. He did not do anything to provoke the pain when it first started. Last night around 2030, he had the same sudden discomfort and pain above his left buttock. He tried to apply ice, but this provided no relief and his pain continued to intensify. Pain is worsened by movement, and this morning his pain was a 10/10 on his way to the ED. Patient also endorses nausea and vomiting last night and into this morning. Denies fever, dysuria, urinary frequency, incontinence, or numbness or weakness in legs. He has been taking ibuprofen for pain control, with he last dose being 4 tablets roughly 45 minutes ago. Denies history of cancer, diabetes, dialysis, blood thinner use, or IV drug use.    Independent Historian   None    Review of External Notes   None    Past Medical History     Medical History and Problem List   Allergic rhinitis  Erectile dysfunction  HSV infection    Medications   Valtrex  Viagra    Surgical History   Knee scope with lateral release    Physical Exam     Patient Vitals for the past 24 hrs:   BP Temp Pulse Resp SpO2 Height Weight   02/16/25 0740 (!) 158/77 97.8  F (36.6  C) 68 18 96 % 1.727 m (5' 8\") 85.8 kg (189 lb 2.5 oz)     Physical Exam      HEENT:   Conjunctiva are normal and without erythema.    NECK:   Supple, no meningismus.     CV:    Regular rate and rhythm     No murmurs, rubs or gallops.      2+ dorsalis pedis pulses bilateral.  PULM:   Clear to auscultation bilateral.      No respiratory distress.  No stridor.  ABD:   Soft, non-tender, non-distendend.      No rebound or guarding.    No CVA " [Dull/Aching] : dull/aching tenderness  MSK:   Patient is non-tender over the thoracic or lumbar spine.      Non-tender at the paraspinal musculature.      No step-off to the bony spine or overlying lesions.   LYMPH:  No cervical lymphadenopathy.  NEURO:  Strength is 5/5 and sensation is intact to the lower extremities bilateral.      2+ patellar tendon reflexes bilateral.    SKIN:   Warm, dry and intact.    PYSCH:   Mood is good and affect is appropriate.      Diagnostics     Lab Results   Labs Ordered and Resulted from Time of ED Arrival to Time of ED Departure   BASIC METABOLIC PANEL - Abnormal       Result Value    Sodium 140      Potassium 4.4      Chloride 107      Carbon Dioxide (CO2) 22      Anion Gap 11      Urea Nitrogen 28.6 (*)     Creatinine 1.60 (*)     GFR Estimate 48 (*)     Calcium 9.5      Glucose 111 (*)    ROUTINE UA WITH MICROSCOPIC REFLEX TO CULTURE - Abnormal    Color Urine Straw      Appearance Urine Clear      Glucose Urine Negative      Bilirubin Urine Negative      Ketones Urine Trace (*)     Specific Gravity Urine 1.010      Blood Urine Moderate (*)     pH Urine 6.0      Protein Albumin Urine Negative      Urobilinogen Urine Normal      Nitrite Urine Negative      Leukocyte Esterase Urine Negative      Mucus Urine Present (*)     RBC Urine 72 (*)     WBC Urine 2      Squamous Epithelials Urine <1     CBC WITH PLATELETS AND DIFFERENTIAL - Abnormal    WBC Count 11.6 (*)     RBC Count 4.54      Hemoglobin 15.0      Hematocrit 42.2      MCV 93      MCH 33.0      MCHC 35.5      RDW 11.9      Platelet Count 189      % Neutrophils 80      % Lymphocytes 10      % Monocytes 10      % Eosinophils 1      % Basophils 0      % Immature Granulocytes 0      NRBCs per 100 WBC 0      Absolute Neutrophils 9.3 (*)     Absolute Lymphocytes 1.1      Absolute Monocytes 1.1      Absolute Eosinophils 0.1      Absolute Basophils 0.0      Absolute Immature Granulocytes 0.0      Absolute NRBCs 0.0       Imaging   CT Abdomen Pelvis w Contrast    Final Result   IMPRESSION:    1.  Left hydronephrosis secondary to a 6 x 4 x 4 mm calculus proximal left ureter at the L3-L4 level. Punctate nonobstructing calculus left lower pole calyx. No right-sided urinary tract calculi.   2.  5 x 4.5 x 4.5 cm heterogeneously enhancing mass arising from the posterior cortex of the mid left kidney, compatible with a renal cell carcinoma. No evidence of renal vein extension nor retroperitoneal lymphadenopathy.   3.  At least 3 mixed lytic/sclerotic bony exostoses arising from the left pubic bone and bilateral superior pubic rami. Differential considerations would include multiple osteochondromas. Osseous metastases are entirely excluded. MRI of the bony pelvis    would be recommended, if no previous comparison studies available.        EKG   None     Independent Interpretation   None    ED Course      Medications Administered   Medications   HYDROmorphone (PF) (DILAUDID) injection 0.5 mg (0.5 mg Intravenous $Given 2/16/25 1035)   ondansetron (ZOFRAN) injection 4 mg (4 mg Intravenous $Given 2/16/25 0823)   sodium chloride 0.9% BOLUS 1,000 mL (0 mLs Intravenous Stopped 2/16/25 0930)   iopamidol (ISOVUE-370) solution 500 mL (100 mLs Intravenous $Given 2/16/25 0947)   sodium chloride (PF) 0.9% PF flush 100 mL (65 mLs Intravenous $Given 2/16/25 0947)   morphine (PF) injection 6 mg (6 mg Intravenous $Given 2/16/25 1057)   haloperidol lactate (HALDOL) injection 2.5 mg (2.5 mg Intravenous $Given 2/16/25 1057)   diphenhydrAMINE (BENADRYL) injection 25 mg (25 mg Intravenous $Given 2/16/25 1057)     Procedures   None      Discussion of Management   Admitting Hospitalist, Anamaria PRESSLEY CNP with Dr. Jaimes    ED Course   ED Course as of 02/16/25 1134   Sun Feb 16, 2025   0716 I obtained history and examined the patient as noted above.    1107 I rechecked and updated the patient.    1131 I consulted with admitting hospitalist, Anamaria PRESSLEY CNP with Dr. Jaimes, regarding admission.  [3] : 3 Discussed patient's presentation, findings, and plan of care.      Additional Documentation  None    Medical Decision Making / Diagnosis     CMS Diagnoses: None    MIPS       None    MDM     Benedict De Anda is a 64 year old male presents with left-sided flank/back pain.  He has no features concerning for cauda equina syndrome, epidural hematoma, epidural abscess or discitis.  I was primarily concerned for ureteral stone.  CT scan was performed revealing a 4 x 6 mm proximal left ureteral stone.  There is no associated infection but he does have a degree of renal insufficiency with a creatinine of 1.6.  His pain was difficult to control and will require observation stay.    Unfortunately patient was incidentally found to have a 5 cm left renal mass with likely metastatic lesions to the pelvic bones.  Patient was made aware of these findings and the probable findings of renal cell carcinoma.  Patient will need further workup via urology and oncology.    Disposition   The patient was admitted to the hospital.     Diagnosis     ICD-10-CM    1. Ureteral stone  N20.1       2. Acute kidney injury  N17.9       3. Left renal mass  N28.89       4. Lesion of pelvic bone  M89.9          Scribe Disclosure:  ERIC ARSHAD, am serving as a scribe at 7:41 AM on 2/16/2025 to document services personally performed by Yayo Bustillos MD based on my observations and the provider's statements to me.      Yayo Bustillos MD  02/16/25 [id number]     [de-identified] : Patient s/p left shoulder arthroscopic labral repair, SAD on 1/9/24. Was attending PT Program and is feeling better with the therapy.  [] : This patient has had an injection before: no [FreeTextEntry1] : left shoulder  [de-identified] : none  [FreeTextEntry5] : Pt has been feeling relief with physical therapy  [de-identified] : 1/9/24 [de-identified] : labral repair and SAD

## 2025-02-17 ENCOUNTER — APPOINTMENT (OUTPATIENT)
Dept: NUCLEAR MEDICINE | Facility: CLINIC | Age: 65
End: 2025-02-17
Attending: STUDENT IN AN ORGANIZED HEALTH CARE EDUCATION/TRAINING PROGRAM
Payer: COMMERCIAL

## 2025-02-17 ENCOUNTER — TELEPHONE (OUTPATIENT)
Dept: UROLOGY | Facility: CLINIC | Age: 65
End: 2025-02-17
Payer: COMMERCIAL

## 2025-02-17 ENCOUNTER — APPOINTMENT (OUTPATIENT)
Dept: CT IMAGING | Facility: CLINIC | Age: 65
End: 2025-02-17
Attending: STUDENT IN AN ORGANIZED HEALTH CARE EDUCATION/TRAINING PROGRAM
Payer: COMMERCIAL

## 2025-02-17 VITALS
WEIGHT: 189.15 LBS | SYSTOLIC BLOOD PRESSURE: 143 MMHG | BODY MASS INDEX: 28.67 KG/M2 | HEIGHT: 68 IN | RESPIRATION RATE: 20 BRPM | HEART RATE: 64 BPM | OXYGEN SATURATION: 97 % | TEMPERATURE: 98.7 F | DIASTOLIC BLOOD PRESSURE: 5 MMHG

## 2025-02-17 LAB
ANION GAP SERPL CALCULATED.3IONS-SCNC: 10 MMOL/L (ref 7–15)
BUN SERPL-MCNC: 18.8 MG/DL (ref 8–23)
CALCIUM SERPL-MCNC: 8.8 MG/DL (ref 8.8–10.4)
CHLORIDE SERPL-SCNC: 105 MMOL/L (ref 98–107)
CREAT SERPL-MCNC: 1.32 MG/DL (ref 0.67–1.17)
EGFRCR SERPLBLD CKD-EPI 2021: 61 ML/MIN/1.73M2
ERYTHROCYTE [DISTWIDTH] IN BLOOD BY AUTOMATED COUNT: 11.9 % (ref 10–15)
GLUCOSE SERPL-MCNC: 115 MG/DL (ref 70–99)
HCO3 SERPL-SCNC: 23 MMOL/L (ref 22–29)
HCT VFR BLD AUTO: 38.7 % (ref 40–53)
HGB BLD-MCNC: 13.5 G/DL (ref 13.3–17.7)
MCH RBC QN AUTO: 32.9 PG (ref 26.5–33)
MCHC RBC AUTO-ENTMCNC: 34.9 G/DL (ref 31.5–36.5)
MCV RBC AUTO: 94 FL (ref 78–100)
PLATELET # BLD AUTO: 172 10E3/UL (ref 150–450)
POTASSIUM SERPL-SCNC: 3.9 MMOL/L (ref 3.4–5.3)
RBC # BLD AUTO: 4.1 10E6/UL (ref 4.4–5.9)
SODIUM SERPL-SCNC: 138 MMOL/L (ref 135–145)
WBC # BLD AUTO: 11.1 10E3/UL (ref 4–11)

## 2025-02-17 PROCEDURE — 258N000003 HC RX IP 258 OP 636: Performed by: STUDENT IN AN ORGANIZED HEALTH CARE EDUCATION/TRAINING PROGRAM

## 2025-02-17 PROCEDURE — 80048 BASIC METABOLIC PNL TOTAL CA: CPT | Performed by: STUDENT IN AN ORGANIZED HEALTH CARE EDUCATION/TRAINING PROGRAM

## 2025-02-17 PROCEDURE — 78306 BONE IMAGING WHOLE BODY: CPT

## 2025-02-17 PROCEDURE — 250N000013 HC RX MED GY IP 250 OP 250 PS 637: Performed by: STUDENT IN AN ORGANIZED HEALTH CARE EDUCATION/TRAINING PROGRAM

## 2025-02-17 PROCEDURE — A9503 TC99M MEDRONATE: HCPCS | Performed by: STUDENT IN AN ORGANIZED HEALTH CARE EDUCATION/TRAINING PROGRAM

## 2025-02-17 PROCEDURE — 71260 CT THORAX DX C+: CPT

## 2025-02-17 PROCEDURE — 82374 ASSAY BLOOD CARBON DIOXIDE: CPT | Performed by: STUDENT IN AN ORGANIZED HEALTH CARE EDUCATION/TRAINING PROGRAM

## 2025-02-17 PROCEDURE — 36415 COLL VENOUS BLD VENIPUNCTURE: CPT | Performed by: STUDENT IN AN ORGANIZED HEALTH CARE EDUCATION/TRAINING PROGRAM

## 2025-02-17 PROCEDURE — 343N000001 HC RX 343 MED OP 636: Performed by: STUDENT IN AN ORGANIZED HEALTH CARE EDUCATION/TRAINING PROGRAM

## 2025-02-17 PROCEDURE — G0378 HOSPITAL OBSERVATION PER HR: HCPCS

## 2025-02-17 PROCEDURE — 85027 COMPLETE CBC AUTOMATED: CPT | Performed by: STUDENT IN AN ORGANIZED HEALTH CARE EDUCATION/TRAINING PROGRAM

## 2025-02-17 PROCEDURE — 99239 HOSP IP/OBS DSCHRG MGMT >30: CPT | Performed by: STUDENT IN AN ORGANIZED HEALTH CARE EDUCATION/TRAINING PROGRAM

## 2025-02-17 PROCEDURE — 250N000011 HC RX IP 250 OP 636: Performed by: STUDENT IN AN ORGANIZED HEALTH CARE EDUCATION/TRAINING PROGRAM

## 2025-02-17 PROCEDURE — 250N000009 HC RX 250: Performed by: STUDENT IN AN ORGANIZED HEALTH CARE EDUCATION/TRAINING PROGRAM

## 2025-02-17 RX ORDER — TAMSULOSIN HYDROCHLORIDE 0.4 MG/1
0.4 CAPSULE ORAL DAILY
Status: DISCONTINUED | OUTPATIENT
Start: 2025-02-17 | End: 2025-02-17 | Stop reason: HOSPADM

## 2025-02-17 RX ORDER — OXYBUTYNIN CHLORIDE 5 MG/1
5 TABLET ORAL 3 TIMES DAILY PRN
Status: DISCONTINUED | OUTPATIENT
Start: 2025-02-17 | End: 2025-02-17 | Stop reason: HOSPADM

## 2025-02-17 RX ORDER — TC 99M MEDRONATE 20 MG/10ML
25 INJECTION, POWDER, LYOPHILIZED, FOR SOLUTION INTRAVENOUS ONCE
Status: COMPLETED | OUTPATIENT
Start: 2025-02-17 | End: 2025-02-17

## 2025-02-17 RX ORDER — TAMSULOSIN HYDROCHLORIDE 0.4 MG/1
0.4 CAPSULE ORAL DAILY
Qty: 30 CAPSULE | Refills: 0 | Status: SHIPPED | OUTPATIENT
Start: 2025-02-17

## 2025-02-17 RX ORDER — OXYBUTYNIN CHLORIDE 5 MG/1
5 TABLET ORAL 3 TIMES DAILY PRN
Qty: 30 TABLET | Refills: 0 | Status: SHIPPED | OUTPATIENT
Start: 2025-02-17

## 2025-02-17 RX ORDER — IOPAMIDOL 755 MG/ML
500 INJECTION, SOLUTION INTRAVASCULAR ONCE
Status: COMPLETED | OUTPATIENT
Start: 2025-02-17 | End: 2025-02-17

## 2025-02-17 RX ADMIN — SODIUM CHLORIDE 100 ML: 9 INJECTION, SOLUTION INTRAVENOUS at 09:02

## 2025-02-17 RX ADMIN — IOPAMIDOL 96 ML: 755 INJECTION, SOLUTION INTRAVENOUS at 09:01

## 2025-02-17 RX ADMIN — TC 99M MEDRONATE 27.5 MILLICURIE: 20 INJECTION, POWDER, LYOPHILIZED, FOR SOLUTION INTRAVENOUS at 09:54

## 2025-02-17 RX ADMIN — SODIUM CHLORIDE, POTASSIUM CHLORIDE, SODIUM LACTATE AND CALCIUM CHLORIDE: 600; 310; 30; 20 INJECTION, SOLUTION INTRAVENOUS at 06:39

## 2025-02-17 RX ADMIN — TAMSULOSIN HYDROCHLORIDE 0.4 MG: 0.4 CAPSULE ORAL at 11:45

## 2025-02-17 ASSESSMENT — ACTIVITIES OF DAILY LIVING (ADL)
ADLS_ACUITY_SCORE: 48
ADLS_ACUITY_SCORE: 45
ADLS_ACUITY_SCORE: 43
ADLS_ACUITY_SCORE: 48
ADLS_ACUITY_SCORE: 48
ADLS_ACUITY_SCORE: 43
ADLS_ACUITY_SCORE: 43
ADLS_ACUITY_SCORE: 48
ADLS_ACUITY_SCORE: 43
ADLS_ACUITY_SCORE: 48
ADLS_ACUITY_SCORE: 45

## 2025-02-17 NOTE — DISCHARGE SUMMARY
Discharge Note    Patient discharged to home via private vehicle  accompanied by significant other .  IV: Discontinued  Prescriptions filled and given to patient/family.   Belongings reviewed and sent with patient.   Home medications returned to patient: NA  Equipment sent with: N/A.   patient verbalizes understanding of discharge instructions. AVS given to patient.  Additional education completed?  N/A

## 2025-02-17 NOTE — DISCHARGE SUMMARY
"[facility]  Hospitalist Discharge Summary      Date of Admission:  2/16/2025  Date of Discharge:  2/17/2025  1:44 PM  Discharging Provider: Bindu Santiago DO  Discharge Service: Hospitalist Service    Discharge Diagnoses   Left-sided kidney stone with hydronephrosis  New left kidney mass  Acute Kidney Injury  Leukocytosis, mild    Clinically Significant Risk Factors     # Overweight: Estimated body mass index is 28.76 kg/m  as calculated from the following:    Height as of this encounter: 1.727 m (5' 8\").    Weight as of this encounter: 85.8 kg (189 lb 2.5 oz).       Follow-ups Needed After Discharge   Follow-up Appointments       Follow-up and recommended labs and tests       Follow up with primary care provider, Redd Tejada, within 7 days for hospital follow- up.      Follow up with urology for stent removal and to discuss next steps for kidney mass.                Discharge Disposition   Discharged to home  Condition at discharge: Stable    Hospital Course   Benedict De Anda is a 64 year old male with known medical history of HSV, depression and allergic rhinitis admitted on 2/16/2025.      Patient presents with acute onset left lower back, flank, suprapubic pain 9/10 that started on 2/15/2025 with associated nausea and vomiting x 2.  He did had a similar episode about 3 weeks ago which resolved on its own.  He has been taking ibuprofen for pain control at home which did not help.  No personal or family history of cancer.     Patient denies diarrhea. Denies cough, chest pain, or shortness of breath and no reported headaches or vision changes. Denies any urinary symptoms of dysuria or frequency. Patient reports no recent long distance travel, camping or sick changes.      ED course: /77. Labs with Cr 1.60, WBC 11.6, UA with WBC 2, RBC 72, neg for nitrite. CT abdomen pelvis with Left hydronephrosis secondary to a 6 x 4 x 4 mm calculus proximal left ureter at the L3-L4 " level. Punctate nonobstructing calculus left lower pole calyx, 5 x 4.5 x 4.5 cm heterogeneously enhancing mass arising from the posterior cortex of the mid left kidney, compatible with a renal cell carcinoma. At least 3 mixed lytic/sclerotic bony exostoses arising from the left pubic bone and bilateral superior pubic rami.      Left-sided kidney stone with hydronephrosis  Acute onset left lower back pain 9/1 radiating to left rib, and suprapubic area.  CT abdomen pelvis with left hydronephrosis secondary to 6 x 4 x 4 mm calculus proximal left ureter at the L3-L4 level.  Underwent stone extraction and L ureteral stent placement by urology on 2/16/25.   - Tamsulosin and oxybutynin per urology  - Urology follow up for stent removal     New left kidney mass  CT abdomen pelvis done on 2/16/2025 showed 5 x 4.5 x 4.5 cm heterogeneously enhancing mass arising from the posterior cortex of the mid left kidney, compatible with renal cell carcinoma.  At least 6 mixed lytic/sclerotic bony exostosis arising from the left pubic bone and bilateral superior pubic rami.  Patient and family aware of the new kidney mass. CT chest w/ contrast negative for mets. Bone scan completed, results pending.  - Urology follow up to review results of bone scan and discuss next steps     Acute Kidney Injury, improving  Cr 1.60 with baseline around 1.10. Likely due to nausea and vomiting and kidney stone. Improved to 1.32 with IVF and stone removal/stent placement.     Leukocytosis, mild  WBC 11.6 which is likely due to dehydration. UA negative for nitrite and WBC 2.    Consultations This Hospital Stay   UROLOGY IP CONSULT    Code Status   Full Code    Time Spent on this Encounter   I, Bindu Santiago DO, personally saw the patient today and spent greater than 30 minutes discharging this patient.       Bindu Santiago DO  [facility] [facility] [address] [facility]  [address] [place] [address]  Phone: [phone]  Fax:  [phone]  ______________________________________________________________________    Physical Exam   Vital Signs: Temp: 98.7  F (37.1  C) Temp src: Oral BP: (!) 143/5 Pulse: 64   Resp: 20 SpO2: 97 % O2 Device: None (Room air) Oxygen Delivery: 5 LPM  Weight: 189 lbs 2.47 oz  Constitutional: Awake, alert, no distress, and cooperative  Cardiovascular: Regular rate and rhythm, normal S1 and S2, no S3 or S4, and no murmur noted  Respiratory: No increased work of breathing, good air exchange, clear to auscultation bilaterally, no crackles or wheezing  Gastrointestinal: Abdomen soft, non-tender, non-distended. BS normal. No masses, organomegaly        Primary Care Physician   Redd Tejada    Discharge Orders      Reason for your hospital stay    You were in the hospital for an obstructing kidney stone that was surgically removed. A stent was also placed to help relieve the obstruction.    A new kidney mass was also found on imaging. You will need to follow up with urology for further evaluation.     Activity    Your activity upon discharge: activity as tolerated     Follow-up and recommended labs and tests     Follow up with primary care provider, Redd Tejada, within 7 days for hospital follow- up.      Follow up with urology for stent removal and to discuss next steps for kidney mass.     Diet    Follow this diet upon discharge:       Regular Diet Adult       Significant Results and Procedures   Most Recent 3 CBC's:  Recent Labs   Lab Test 02/17/25  0618 02/16/25  0827 05/06/24  1657   WBC 11.1* 11.6* 8.7   HGB 13.5 15.0 15.3   MCV 94 93 97    189 206     Most Recent 3 BMP's:  Recent Labs   Lab Test 02/17/25  1000 02/16/25  0827 12/10/24  0000 05/06/24  1657    140 140.3 142   POTASSIUM 3.9 4.4 4.18 4.4   CHLORIDE 105 107 106.5 108*   CO2 23 22 25.0 25   BUN 18.8 28.6* 22  20 23.9*   CR 1.32* 1.60* 1.10 1.14   ANIONGAP 10 11  --  9   EMMA 8.8 9.5 9.5 9.1   * 111* 94 98   ,    Results for orders placed or performed during the hospital encounter of 02/16/25   CT Abdomen Pelvis w Contrast    Narrative    EXAM: CT ABDOMEN PELVIS W CONTRAST  LOCATION: [facility]  DATE: 2/16/2025    INDICATION: Left flank pain.  COMPARISON: None.  TECHNIQUE: CT scan of the abdomen and pelvis was performed following injection of IV contrast. Multiplanar reformats were obtained. Dose reduction techniques were used.  CONTRAST: 100mL Isovue 370    FINDINGS:   LOWER CHEST: Bibasilar subsegmental atelectasis versus scar.    HEPATOBILIARY: Normal.    PANCREAS: Normal.    SPLEEN: Normal.    ADRENAL GLANDS: Normal.    KIDNEYS/BLADDER: Left hydronephrosis secondary to a 6 x 4 x 4 mm calculus proximal left ureter at the L3-L4 level. Punctate calculus within left lower pole calyx. No right-sided urinary tract calculus. 5 x 4.5 x 4.5 cm heterogeneously enhancing cortical   mass arising from posterior mid left renal cortex. Left renal veins are patent. Tiny hypoattenuating cortical lesions right kidney are too small to characterize with imaging. The right ureter and bladder are negative.    BOWEL: Diverticulosis of the colon. No acute inflammatory change. No obstruction.     LYMPH NODES: No lymphadenopathy.    VASCULATURE: No aortoiliac aneurysm.    PELVIC ORGANS: Normal.    MUSCULOSKELETAL: At least 3 mixed lytic/sclerotic bony exostoses arising from the left pubic bone and bilateral superior pubic rami. Findings could represent multiple osteochondromas, but are technically indeterminate in setting of large left renal mass.      Impression    IMPRESSION:   1.  Left hydronephrosis secondary to a 6 x 4 x 4 mm calculus proximal left ureter at the L3-L4 level. Punctate nonobstructing calculus left lower pole calyx. No right-sided urinary tract calculi.  2.  5 x 4.5 x 4.5 cm heterogeneously enhancing mass arising from the posterior cortex of the mid left kidney, compatible with a renal cell carcinoma.  No evidence of renal vein extension nor retroperitoneal lymphadenopathy.  3.  At least 3 mixed lytic/sclerotic bony exostoses arising from the left pubic bone and bilateral superior pubic rami. Differential considerations would include multiple osteochondromas. Osseous metastases are entirely excluded. MRI of the bony pelvis   would be recommended, if no previous comparison studies available.   XR Surgery GIANA L/T 5 Min Fluoro w Stills    Narrative    This exam was marked as non-reportable because it will not be read by a   radiologist or a [place] non-radiologist provider.         CT Chest w Contrast    Narrative    EXAM: CT CHEST W CONTRAST  LOCATION: [facility]  DATE: 2/17/2025    INDICATION: renal mass  >5 cm  COMPARISON: Abdomen/pelvis CT from 02/16/2025 is reviewed.   TECHNIQUE: CT chest with IV contrast. Multiplanar reformats were obtained. Dose reduction techniques were used.    CONTRAST: 96mL Isovue 370    FINDINGS:   LUNGS AND PLEURA: Benign variant right upper lobe azygos fissure. Strands of atelectasis both lungs.     MEDIASTINUM/AXILLAE: Normal.    CORONARY ARTERY CALCIFICATION: None.     UPPER ABDOMEN: Left ureteral stent.     MUSCULOSKELETAL: Calcific tendinitis right rotator cuff. Old right sixth rib fracture. Minimal degenerative change T3-T4 interspace.       Impression    IMPRESSION:   No evidence of thoracic metastases    NM Bone Scan Whole Body    Narrative    EXAM: NM BONE SCAN WHOLE BODY  LOCATION: [facility]  DATE: 2/17/2025    INDICATION: Malignant neoplasm of left kidney, except renal pelvis  COMPARISON: CT the abdomen pelvis dated 2/16/2025 and CT the chest dated 2/17/2025.  TECHNIQUE: 27.5 mCi technetium-99m MDP, IV. Anterior and posterior delayed whole-body images at 3 hours with additional spot images of the skull.    FINDINGS: Radiotracer uptake in a pattern typical of degenerative change involving the shoulders, spine, hips, knees, mid  feet, and great toes, without suspicious areas of altered uptake to suggest osseous metastasis.      Impression    IMPRESSION:    No osseous metastasis.       Discharge Medications   Discharge Medication List as of 2/17/2025  1:09 PM        START taking these medications    Details   oxyBUTYnin (DITROPAN) 5 MG tablet Take 1 tablet (5 mg) by mouth 3 times daily as needed for bladder spasms., Disp-30 tablet, R-0, E-Prescribe      tamsulosin (FLOMAX) 0.4 MG capsule Take 1 capsule (0.4 mg) by mouth daily., Disp-30 capsule, R-0, E-Prescribe           CONTINUE these medications which have NOT CHANGED    Details   sildenafil (VIAGRA) 100 MG tablet Take 1 tablet (100 mg) by mouth daily as needed (for ED)., Disp-30 tablet, R-0, E-PrescribeRun through with Collin daypon BIN [id number] [facility] [id number] Member ID [id number]      valACYclovir (VALTREX) 500 MG tablet Take 1 tablet (500 mg) by mouth daily., Disp-90 tablet, R-3, E-Prescribe           Allergies   Allergies   Allergen Reactions    Amoxicillin Hives

## 2025-02-17 NOTE — TELEPHONE ENCOUNTER
----- Message from Xena Nj sent at 2/16/2025  4:50 PM CST -----  Regarding: Stent removal  Hi all-    Could you please schedule this patient for stent removal with me on 3/2?    Thanks    -RM

## 2025-02-17 NOTE — PROGRESS NOTES
Progress Notes  Examined and evaluated Martín who seems to be doing better though he does have some stent discomfort at this time  We discussed about stent discomfort and management with medications and I will recommend medications for the same upon discharge  We also discussed in detail about the left large renal mass and need for a staging evaluation in view of the need to rule out metastatic disease in the chest and the bones.  I will recommend  getting a CT chest and a bone scan done and subsequent follow-up to discuss these in the clinic  Recommendations  1.  Okay to discharge from urology perspective, would consider adding tamsulosin as well as oxybutynin 5 mg up to 3 times a day for stent discomfort  2.  We will arrange for stent removal with urology clinic and subsequent follow-up with CT chest and bone scan.  If these can be done prior to discharge that would be great.      John Ramos MD

## 2025-02-17 NOTE — PLAN OF CARE
"Pt is A&Ox4, VSS on ra, has stinging and blood when urinating, otherwise denies any pain, denies Sob, nausea, PIV assessed and flushed, LR running at 100 mL/hr, SBA.  Goal Outcome Evaluation:      Plan of Care Reviewed With: patient    Overall Patient Progress: no changeOverall Patient Progress: no change    Outcome Evaluation: Admision to unit      Problem: Adult Inpatient Plan of Care  Goal: Plan of Care Review  Description: The Plan of Care Review/Shift note should be completed every shift.  The Outcome Evaluation is a brief statement about your assessment that the patient is improving, declining, or no change.  This information will be displayed automatically on your shift  note.  Outcome: Progressing  Flowsheets (Taken 2/17/2025 0612)  Outcome Evaluation: Admision to unit  Plan of Care Reviewed With: patient  Overall Patient Progress: no change  Goal: Patient-Specific Goal (Individualized)  Description: You can add care plan individualizations to a care plan. Examples of Individualization might be:  \"Parent requests to be called daily at 9am for status\", \"I have a hard time hearing out of my right ear\", or \"Do not touch me to wake me up as it startles  me\".  Outcome: Progressing  Goal: Absence of Hospital-Acquired Illness or Injury  Outcome: Progressing  Intervention: Identify and Manage Fall Risk  Recent Flowsheet Documentation  Taken 2/16/2025 2100 by Harrison Nicholson RN  Safety Promotion/Fall Prevention: activity supervised  Intervention: Prevent Skin Injury  Recent Flowsheet Documentation  Taken 2/16/2025 2100 by Harrison Nicholson RN  Body Position: position changed independently  Intervention: Prevent Infection  Recent Flowsheet Documentation  Taken 2/16/2025 2100 by Harrison Nicholson RN  Infection Prevention:   cohorting utilized   rest/sleep promoted  Goal: Optimal Comfort and Wellbeing  Outcome: Progressing  Goal: Readiness for Transition of Care  Outcome: Progressing     Problem: Pain " Acute  Goal: Optimal Pain Control and Function  Outcome: Progressing  Intervention: Prevent or Manage Pain  Recent Flowsheet Documentation  Taken 2/16/2025 2100 by Harrison Nicholson RN  Medication Review/Management: medications reviewed     Problem: Fall Injury Risk  Goal: Absence of Fall and Fall-Related Injury  Outcome: Progressing  Intervention: Identify and Manage Contributors  Recent Flowsheet Documentation  Taken 2/16/2025 2100 by Harrison Nicholson RN  Medication Review/Management: medications reviewed  Intervention: Promote Injury-Free Environment  Recent Flowsheet Documentation  Taken 2/16/2025 2100 by Harrison Nicholson RN  Safety Promotion/Fall Prevention: activity supervised     Problem: Infection  Goal: Absence of Infection Signs and Symptoms  Outcome: Progressing

## 2025-02-17 NOTE — TELEPHONE ENCOUNTER
----- Message from Xena Nj sent at 2/17/2025  3:11 PM CST -----  Regarding: RE: follow up  I only put his stent removal on 3/2 because I am on vacation next week.    Dr. Ramos- if you had time to meet with him next week, would you also be able to remove his stent? If not- I will remove on 3/2-  no worries.     -  ----- Message -----  From: Dee Dee Madera  Sent: 2/17/2025   2:30 PM CST  To: Xena Nj MD; John Ramos MD  Subject: RE: follow up                                    Dr. Clem Blanchard's orders say to schedule the stent removal around 3/2, just want to double check about the timing of the surgery consult. Do you want in the next week or in March after his stent removal?  ----- Message -----  From: John Ramos MD  Sent: 2/17/2025   9:17 AM CST  To: Xena Nj MD; Dee Dee Madera  Subject: follow up                                        Harrison Merrittah  Can you have Martín on my schedule in the next week or so to discuss surgery for his renal tumor  I had ordered some scans for him in the hospital and he should have those available for the visit. I think he is also getting a follow-up with Dr Nj for stent removal in the next few days and I can see him after that

## 2025-02-19 ENCOUNTER — CARE COORDINATION (OUTPATIENT)
Dept: FAMILY MEDICINE | Facility: CLINIC | Age: 65
End: 2025-02-19

## 2025-02-19 LAB
APPEARANCE STONE: NORMAL
COMPN STONE: NORMAL
SPECIMEN WT: 22 MG

## 2025-02-20 ENCOUNTER — HOSPITAL ENCOUNTER (EMERGENCY)
Facility: CLINIC | Age: 65
Discharge: HOME | End: 2025-02-20
Attending: EMERGENCY MEDICINE
Payer: COMMERCIAL

## 2025-02-20 ENCOUNTER — HOSPITAL ENCOUNTER (INPATIENT)
Facility: CLINIC | Age: 65
LOS: 5 days | Discharge: HOME | End: 2025-02-25
Attending: STUDENT IN AN ORGANIZED HEALTH CARE EDUCATION/TRAINING PROGRAM | Admitting: INTERNAL MEDICINE
Payer: COMMERCIAL

## 2025-02-20 ENCOUNTER — NURSE TRIAGE (OUTPATIENT)
Dept: FAMILY MEDICINE | Facility: CLINIC | Age: 65
End: 2025-02-20

## 2025-02-20 ENCOUNTER — APPOINTMENT (OUTPATIENT)
Dept: CT IMAGING | Facility: CLINIC | Age: 65
End: 2025-02-20
Attending: STUDENT IN AN ORGANIZED HEALTH CARE EDUCATION/TRAINING PROGRAM
Payer: COMMERCIAL

## 2025-02-20 ENCOUNTER — APPOINTMENT (OUTPATIENT)
Dept: GENERAL RADIOLOGY | Facility: CLINIC | Age: 65
End: 2025-02-20
Attending: EMERGENCY MEDICINE
Payer: COMMERCIAL

## 2025-02-20 VITALS
HEIGHT: 68 IN | BODY MASS INDEX: 28.67 KG/M2 | HEART RATE: 77 BPM | TEMPERATURE: 99.1 F | SYSTOLIC BLOOD PRESSURE: 144 MMHG | RESPIRATION RATE: 18 BRPM | WEIGHT: 189.15 LBS | OXYGEN SATURATION: 98 % | DIASTOLIC BLOOD PRESSURE: 65 MMHG

## 2025-02-20 DIAGNOSIS — R42 DIZZINESS: ICD-10-CM

## 2025-02-20 DIAGNOSIS — R10.9 FLANK PAIN: ICD-10-CM

## 2025-02-20 DIAGNOSIS — R65.20: ICD-10-CM

## 2025-02-20 DIAGNOSIS — N39.0 COMPLICATED UTI (URINARY TRACT INFECTION): ICD-10-CM

## 2025-02-20 DIAGNOSIS — T83.592A INFECTION ASSOCIATED WITH INDWELLING URETERAL STENT, INITIAL ENCOUNTER: ICD-10-CM

## 2025-02-20 DIAGNOSIS — R30.0 DYSURIA: ICD-10-CM

## 2025-02-20 DIAGNOSIS — R31.0 GROSS HEMATURIA: ICD-10-CM

## 2025-02-20 DIAGNOSIS — Z96.0 URETERAL STENT PRESENT: ICD-10-CM

## 2025-02-20 DIAGNOSIS — N12 PYELONEPHRITIS OF LEFT KIDNEY: Primary | ICD-10-CM

## 2025-02-20 DIAGNOSIS — A41.89: ICD-10-CM

## 2025-02-20 LAB
ALBUMIN SERPL BCG-MCNC: 3.8 G/DL (ref 3.5–5.2)
ALBUMIN UR-MCNC: 50 MG/DL
ALP SERPL-CCNC: 79 U/L (ref 40–150)
ALT SERPL W P-5'-P-CCNC: 26 U/L (ref 0–70)
ANION GAP SERPL CALCULATED.3IONS-SCNC: 12 MMOL/L (ref 7–15)
ANION GAP SERPL CALCULATED.3IONS-SCNC: 12 MMOL/L (ref 7–15)
APPEARANCE UR: ABNORMAL
AST SERPL W P-5'-P-CCNC: 28 U/L (ref 0–45)
BASOPHILS # BLD AUTO: 0 10E3/UL (ref 0–0.2)
BASOPHILS # BLD AUTO: 0 10E3/UL (ref 0–0.2)
BASOPHILS NFR BLD AUTO: 0 %
BASOPHILS NFR BLD AUTO: 0 %
BILIRUB SERPL-MCNC: 1.7 MG/DL
BILIRUB UR QL STRIP: NEGATIVE
BUN SERPL-MCNC: 15.4 MG/DL (ref 8–23)
BUN SERPL-MCNC: 16.5 MG/DL (ref 8–23)
CALCIUM SERPL-MCNC: 8.6 MG/DL (ref 8.8–10.4)
CALCIUM SERPL-MCNC: 9.2 MG/DL (ref 8.8–10.4)
CHLORIDE SERPL-SCNC: 101 MMOL/L (ref 98–107)
CHLORIDE SERPL-SCNC: 104 MMOL/L (ref 98–107)
COLOR UR AUTO: YELLOW
CREAT SERPL-MCNC: 1.22 MG/DL (ref 0.67–1.17)
CREAT SERPL-MCNC: 1.36 MG/DL (ref 0.67–1.17)
EGFRCR SERPLBLD CKD-EPI 2021: 58 ML/MIN/1.73M2
EGFRCR SERPLBLD CKD-EPI 2021: 67 ML/MIN/1.73M2
EOSINOPHIL # BLD AUTO: 0 10E3/UL (ref 0–0.7)
EOSINOPHIL # BLD AUTO: 0 10E3/UL (ref 0–0.7)
EOSINOPHIL NFR BLD AUTO: 0 %
EOSINOPHIL NFR BLD AUTO: 0 %
ERYTHROCYTE [DISTWIDTH] IN BLOOD BY AUTOMATED COUNT: 11.9 % (ref 10–15)
ERYTHROCYTE [DISTWIDTH] IN BLOOD BY AUTOMATED COUNT: 11.9 % (ref 10–15)
GLUCOSE SERPL-MCNC: 120 MG/DL (ref 70–99)
GLUCOSE SERPL-MCNC: 132 MG/DL (ref 70–99)
GLUCOSE UR STRIP-MCNC: NEGATIVE MG/DL
HCO3 BLDV-SCNC: 25 MMOL/L (ref 21–28)
HCO3 SERPL-SCNC: 21 MMOL/L (ref 22–29)
HCO3 SERPL-SCNC: 22 MMOL/L (ref 22–29)
HCT VFR BLD AUTO: 41.1 % (ref 40–53)
HCT VFR BLD AUTO: 42.2 % (ref 40–53)
HGB BLD-MCNC: 14.2 G/DL (ref 13.3–17.7)
HGB BLD-MCNC: 14.8 G/DL (ref 13.3–17.7)
HGB UR QL STRIP: ABNORMAL
HOLD SPECIMEN: NORMAL
IMM GRANULOCYTES # BLD: 0.1 10E3/UL
IMM GRANULOCYTES # BLD: 0.2 10E3/UL
IMM GRANULOCYTES NFR BLD: 1 %
IMM GRANULOCYTES NFR BLD: 1 %
KETONES UR STRIP-MCNC: NEGATIVE MG/DL
LACTATE BLD-SCNC: 1.9 MMOL/L
LEUKOCYTE ESTERASE UR QL STRIP: ABNORMAL
LYMPHOCYTES # BLD AUTO: 0.7 10E3/UL (ref 0.8–5.3)
LYMPHOCYTES # BLD AUTO: 0.7 10E3/UL (ref 0.8–5.3)
LYMPHOCYTES NFR BLD AUTO: 4 %
LYMPHOCYTES NFR BLD AUTO: 5 %
MCH RBC QN AUTO: 32.9 PG (ref 26.5–33)
MCH RBC QN AUTO: 32.9 PG (ref 26.5–33)
MCHC RBC AUTO-ENTMCNC: 34.5 G/DL (ref 31.5–36.5)
MCHC RBC AUTO-ENTMCNC: 35.1 G/DL (ref 31.5–36.5)
MCV RBC AUTO: 94 FL (ref 78–100)
MCV RBC AUTO: 95 FL (ref 78–100)
MONOCYTES # BLD AUTO: 0.6 10E3/UL (ref 0–1.3)
MONOCYTES # BLD AUTO: 1.2 10E3/UL (ref 0–1.3)
MONOCYTES NFR BLD AUTO: 4 %
MONOCYTES NFR BLD AUTO: 9 %
MUCOUS THREADS #/AREA URNS LPF: PRESENT /LPF
NEUTROPHILS # BLD AUTO: 11 10E3/UL (ref 1.6–8.3)
NEUTROPHILS # BLD AUTO: 14.7 10E3/UL (ref 1.6–8.3)
NEUTROPHILS NFR BLD AUTO: 84 %
NEUTROPHILS NFR BLD AUTO: 91 %
NITRATE UR QL: NEGATIVE
NRBC # BLD AUTO: 0 10E3/UL
NRBC # BLD AUTO: 0 10E3/UL
NRBC BLD AUTO-RTO: 0 /100
NRBC BLD AUTO-RTO: 0 /100
PCO2 BLDV: 36 MM HG (ref 40–50)
PH BLDV: 7.44 [PH] (ref 7.32–7.43)
PH UR STRIP: 8 [PH] (ref 5–7)
PLATELET # BLD AUTO: 158 10E3/UL (ref 150–450)
PLATELET # BLD AUTO: 173 10E3/UL (ref 150–450)
PO2 BLDV: 13 MM HG (ref 25–47)
POTASSIUM SERPL-SCNC: 3.9 MMOL/L (ref 3.4–5.3)
POTASSIUM SERPL-SCNC: 4 MMOL/L (ref 3.4–5.3)
PROT SERPL-MCNC: 6.7 G/DL (ref 6.4–8.3)
RBC # BLD AUTO: 4.31 10E6/UL (ref 4.4–5.9)
RBC # BLD AUTO: 4.5 10E6/UL (ref 4.4–5.9)
RBC URINE: >182 /HPF
SAO2 % BLDV: 16 % (ref 70–75)
SODIUM SERPL-SCNC: 135 MMOL/L (ref 135–145)
SODIUM SERPL-SCNC: 137 MMOL/L (ref 135–145)
SP GR UR STRIP: 1.02 (ref 1–1.03)
UROBILINOGEN UR STRIP-MCNC: NORMAL MG/DL
WBC # BLD AUTO: 13 10E3/UL (ref 4–11)
WBC # BLD AUTO: 16.1 10E3/UL (ref 4–11)
WBC URINE: 105 /HPF

## 2025-02-20 PROCEDURE — 258N000003 HC RX IP 258 OP 636: Performed by: EMERGENCY MEDICINE

## 2025-02-20 PROCEDURE — 96374 THER/PROPH/DIAG INJ IV PUSH: CPT

## 2025-02-20 PROCEDURE — 36415 COLL VENOUS BLD VENIPUNCTURE: CPT | Performed by: EMERGENCY MEDICINE

## 2025-02-20 PROCEDURE — 99222 1ST HOSP IP/OBS MODERATE 55: CPT | Performed by: INTERNAL MEDICINE

## 2025-02-20 PROCEDURE — 81001 URINALYSIS AUTO W/SCOPE: CPT | Performed by: EMERGENCY MEDICINE

## 2025-02-20 PROCEDURE — 120N000004 HC R&B MS OVERFLOW

## 2025-02-20 PROCEDURE — 258N000003 HC RX IP 258 OP 636: Performed by: INTERNAL MEDICINE

## 2025-02-20 PROCEDURE — 82565 ASSAY OF CREATININE: CPT | Performed by: STUDENT IN AN ORGANIZED HEALTH CARE EDUCATION/TRAINING PROGRAM

## 2025-02-20 PROCEDURE — 250N000011 HC RX IP 250 OP 636: Performed by: STUDENT IN AN ORGANIZED HEALTH CARE EDUCATION/TRAINING PROGRAM

## 2025-02-20 PROCEDURE — 250N000011 HC RX IP 250 OP 636: Performed by: INTERNAL MEDICINE

## 2025-02-20 PROCEDURE — 74177 CT ABD & PELVIS W/CONTRAST: CPT

## 2025-02-20 PROCEDURE — 96361 HYDRATE IV INFUSION ADD-ON: CPT

## 2025-02-20 PROCEDURE — 99284 EMERGENCY DEPT VISIT MOD MDM: CPT | Mod: 25

## 2025-02-20 PROCEDURE — 36415 COLL VENOUS BLD VENIPUNCTURE: CPT | Performed by: STUDENT IN AN ORGANIZED HEALTH CARE EDUCATION/TRAINING PROGRAM

## 2025-02-20 PROCEDURE — 85004 AUTOMATED DIFF WBC COUNT: CPT | Performed by: EMERGENCY MEDICINE

## 2025-02-20 PROCEDURE — 81003 URINALYSIS AUTO W/O SCOPE: CPT | Performed by: EMERGENCY MEDICINE

## 2025-02-20 PROCEDURE — 99285 EMERGENCY DEPT VISIT HI MDM: CPT | Mod: 25

## 2025-02-20 PROCEDURE — 74018 RADEX ABDOMEN 1 VIEW: CPT

## 2025-02-20 PROCEDURE — 82374 ASSAY BLOOD CARBON DIOXIDE: CPT | Performed by: EMERGENCY MEDICINE

## 2025-02-20 PROCEDURE — 250N000011 HC RX IP 250 OP 636: Performed by: EMERGENCY MEDICINE

## 2025-02-20 PROCEDURE — 87040 BLOOD CULTURE FOR BACTERIA: CPT | Performed by: STUDENT IN AN ORGANIZED HEALTH CARE EDUCATION/TRAINING PROGRAM

## 2025-02-20 PROCEDURE — 80048 BASIC METABOLIC PNL TOTAL CA: CPT | Performed by: EMERGENCY MEDICINE

## 2025-02-20 PROCEDURE — 258N000003 HC RX IP 258 OP 636: Performed by: STUDENT IN AN ORGANIZED HEALTH CARE EDUCATION/TRAINING PROGRAM

## 2025-02-20 PROCEDURE — 250N000013 HC RX MED GY IP 250 OP 250 PS 637: Performed by: INTERNAL MEDICINE

## 2025-02-20 PROCEDURE — 85004 AUTOMATED DIFF WBC COUNT: CPT | Performed by: STUDENT IN AN ORGANIZED HEALTH CARE EDUCATION/TRAINING PROGRAM

## 2025-02-20 PROCEDURE — 80048 BASIC METABOLIC PNL TOTAL CA: CPT | Performed by: STUDENT IN AN ORGANIZED HEALTH CARE EDUCATION/TRAINING PROGRAM

## 2025-02-20 PROCEDURE — 87186 SC STD MICRODIL/AGAR DIL: CPT | Performed by: EMERGENCY MEDICINE

## 2025-02-20 PROCEDURE — 82803 BLOOD GASES ANY COMBINATION: CPT

## 2025-02-20 PROCEDURE — 250N000013 HC RX MED GY IP 250 OP 250 PS 637: Performed by: EMERGENCY MEDICINE

## 2025-02-20 RX ORDER — AMOXICILLIN 250 MG
1 CAPSULE ORAL 2 TIMES DAILY PRN
Status: DISCONTINUED | OUTPATIENT
Start: 2025-02-20 | End: 2025-02-25 | Stop reason: HOSPADM

## 2025-02-20 RX ORDER — HYDROMORPHONE HCL IN WATER/PF 6 MG/30 ML
0.2 PATIENT CONTROLLED ANALGESIA SYRINGE INTRAVENOUS
Status: DISCONTINUED | OUTPATIENT
Start: 2025-02-20 | End: 2025-02-21

## 2025-02-20 RX ORDER — TAMSULOSIN HYDROCHLORIDE 0.4 MG/1
0.4 CAPSULE ORAL DAILY
Status: DISCONTINUED | OUTPATIENT
Start: 2025-02-20 | End: 2025-02-25 | Stop reason: HOSPADM

## 2025-02-20 RX ORDER — IOPAMIDOL 755 MG/ML
500 INJECTION, SOLUTION INTRAVASCULAR ONCE
Status: COMPLETED | OUTPATIENT
Start: 2025-02-20 | End: 2025-02-20

## 2025-02-20 RX ORDER — POLYETHYLENE GLYCOL 3350 17 G/17G
17 POWDER, FOR SOLUTION ORAL 2 TIMES DAILY PRN
Status: DISCONTINUED | OUTPATIENT
Start: 2025-02-20 | End: 2025-02-25 | Stop reason: HOSPADM

## 2025-02-20 RX ORDER — AMOXICILLIN 250 MG
2 CAPSULE ORAL 2 TIMES DAILY PRN
Status: DISCONTINUED | OUTPATIENT
Start: 2025-02-20 | End: 2025-02-25 | Stop reason: HOSPADM

## 2025-02-20 RX ORDER — LIDOCAINE 40 MG/G
CREAM TOPICAL
Status: DISCONTINUED | OUTPATIENT
Start: 2025-02-20 | End: 2025-02-25 | Stop reason: HOSPADM

## 2025-02-20 RX ORDER — HYDROMORPHONE HCL IN WATER/PF 6 MG/30 ML
0.4 PATIENT CONTROLLED ANALGESIA SYRINGE INTRAVENOUS
Status: DISCONTINUED | OUTPATIENT
Start: 2025-02-20 | End: 2025-02-21

## 2025-02-20 RX ORDER — CALCIUM CARBONATE 500 MG/1
1000 TABLET, CHEWABLE ORAL 4 TIMES DAILY PRN
Status: DISCONTINUED | OUTPATIENT
Start: 2025-02-20 | End: 2025-02-25 | Stop reason: HOSPADM

## 2025-02-20 RX ORDER — CEFTRIAXONE 2 G/1
2 INJECTION, POWDER, FOR SOLUTION INTRAMUSCULAR; INTRAVENOUS ONCE
Status: COMPLETED | OUTPATIENT
Start: 2025-02-20 | End: 2025-02-20

## 2025-02-20 RX ORDER — CEFTRIAXONE 1 G/1
1 INJECTION, POWDER, FOR SOLUTION INTRAMUSCULAR; INTRAVENOUS ONCE
Status: DISCONTINUED | OUTPATIENT
Start: 2025-02-20 | End: 2025-02-20

## 2025-02-20 RX ORDER — ACETAMINOPHEN 650 MG/1
650 SUPPOSITORY RECTAL EVERY 4 HOURS PRN
Status: DISCONTINUED | OUTPATIENT
Start: 2025-02-20 | End: 2025-02-21

## 2025-02-20 RX ORDER — DIPHENHYDRAMINE HYDROCHLORIDE 50 MG/ML
25 INJECTION INTRAMUSCULAR; INTRAVENOUS ONCE
Status: COMPLETED | OUTPATIENT
Start: 2025-02-20 | End: 2025-02-20

## 2025-02-20 RX ORDER — NALOXONE HYDROCHLORIDE 0.4 MG/ML
0.4 INJECTION, SOLUTION INTRAMUSCULAR; INTRAVENOUS; SUBCUTANEOUS
Status: DISCONTINUED | OUTPATIENT
Start: 2025-02-20 | End: 2025-02-25 | Stop reason: HOSPADM

## 2025-02-20 RX ORDER — CEFTRIAXONE 2 G/1
2 INJECTION, POWDER, FOR SOLUTION INTRAMUSCULAR; INTRAVENOUS EVERY 24 HOURS
Status: DISCONTINUED | OUTPATIENT
Start: 2025-02-21 | End: 2025-02-24

## 2025-02-20 RX ORDER — VALACYCLOVIR HYDROCHLORIDE 500 MG/1
500 TABLET, FILM COATED ORAL DAILY
Status: DISCONTINUED | OUTPATIENT
Start: 2025-02-20 | End: 2025-02-25 | Stop reason: HOSPADM

## 2025-02-20 RX ORDER — ONDANSETRON 2 MG/ML
4 INJECTION INTRAMUSCULAR; INTRAVENOUS EVERY 6 HOURS PRN
Status: DISCONTINUED | OUTPATIENT
Start: 2025-02-20 | End: 2025-02-25 | Stop reason: HOSPADM

## 2025-02-20 RX ORDER — HYDROMORPHONE HYDROCHLORIDE 2 MG/1
2 TABLET ORAL EVERY 4 HOURS PRN
Status: DISCONTINUED | OUTPATIENT
Start: 2025-02-20 | End: 2025-02-21

## 2025-02-20 RX ORDER — ONDANSETRON 4 MG/1
4 TABLET, ORALLY DISINTEGRATING ORAL EVERY 8 HOURS PRN
Qty: 10 TABLET | Refills: 0 | Status: ON HOLD | OUTPATIENT
Start: 2025-02-20 | End: 2025-02-23

## 2025-02-20 RX ORDER — ONDANSETRON 4 MG/1
4 TABLET, ORALLY DISINTEGRATING ORAL EVERY 6 HOURS PRN
Status: DISCONTINUED | OUTPATIENT
Start: 2025-02-20 | End: 2025-02-25 | Stop reason: HOSPADM

## 2025-02-20 RX ORDER — PHENAZOPYRIDINE HYDROCHLORIDE 100 MG/1
100 TABLET, FILM COATED ORAL 3 TIMES DAILY PRN
Qty: 21 TABLET | Refills: 0 | Status: ON HOLD | OUTPATIENT
Start: 2025-02-20

## 2025-02-20 RX ORDER — NALOXONE HYDROCHLORIDE 0.4 MG/ML
0.2 INJECTION, SOLUTION INTRAMUSCULAR; INTRAVENOUS; SUBCUTANEOUS
Status: DISCONTINUED | OUTPATIENT
Start: 2025-02-20 | End: 2025-02-25 | Stop reason: HOSPADM

## 2025-02-20 RX ORDER — CIPROFLOXACIN 500 MG/1
500 TABLET, FILM COATED ORAL 2 TIMES DAILY
Qty: 10 TABLET | Refills: 0 | Status: ON HOLD | OUTPATIENT
Start: 2025-02-20 | End: 2025-02-25

## 2025-02-20 RX ORDER — PHENAZOPYRIDINE HYDROCHLORIDE 100 MG/1
100 TABLET, FILM COATED ORAL ONCE
Status: COMPLETED | OUTPATIENT
Start: 2025-02-20 | End: 2025-02-20

## 2025-02-20 RX ORDER — SODIUM CHLORIDE 9 MG/ML
INJECTION, SOLUTION INTRAVENOUS CONTINUOUS
Status: DISCONTINUED | OUTPATIENT
Start: 2025-02-20 | End: 2025-02-21

## 2025-02-20 RX ORDER — OXYBUTYNIN CHLORIDE 5 MG/1
5 TABLET ORAL 3 TIMES DAILY PRN
Status: DISCONTINUED | OUTPATIENT
Start: 2025-02-20 | End: 2025-02-25 | Stop reason: HOSPADM

## 2025-02-20 RX ORDER — ACETAMINOPHEN 325 MG/1
650 TABLET ORAL EVERY 4 HOURS PRN
Status: DISCONTINUED | OUTPATIENT
Start: 2025-02-20 | End: 2025-02-21

## 2025-02-20 RX ADMIN — ACETAMINOPHEN 650 MG: 325 TABLET, FILM COATED ORAL at 20:54

## 2025-02-20 RX ADMIN — SODIUM CHLORIDE, PRESERVATIVE FREE: 5 INJECTION INTRAVENOUS at 20:08

## 2025-02-20 RX ADMIN — SODIUM CHLORIDE 1000 ML: 9 INJECTION, SOLUTION INTRAVENOUS at 06:23

## 2025-02-20 RX ADMIN — IOPAMIDOL 95 ML: 755 INJECTION, SOLUTION INTRAVENOUS at 15:46

## 2025-02-20 RX ADMIN — SODIUM CHLORIDE 1000 ML: 9 INJECTION, SOLUTION INTRAVENOUS at 07:45

## 2025-02-20 RX ADMIN — TAMSULOSIN HYDROCHLORIDE 0.4 MG: 0.4 CAPSULE ORAL at 20:54

## 2025-02-20 RX ADMIN — SODIUM CHLORIDE 1000 ML: 9 INJECTION, SOLUTION INTRAVENOUS at 15:01

## 2025-02-20 RX ADMIN — DIPHENHYDRAMINE HYDROCHLORIDE 25 MG: 50 INJECTION, SOLUTION INTRAMUSCULAR; INTRAVENOUS at 06:44

## 2025-02-20 RX ADMIN — CEFTRIAXONE 2 G: 2 INJECTION, POWDER, FOR SOLUTION INTRAMUSCULAR; INTRAVENOUS at 16:00

## 2025-02-20 RX ADMIN — VALACYCLOVIR HYDROCHLORIDE 500 MG: 500 TABLET, FILM COATED ORAL at 20:54

## 2025-02-20 RX ADMIN — HYDROMORPHONE HYDROCHLORIDE 1 MG: 2 TABLET ORAL at 20:54

## 2025-02-20 RX ADMIN — ONDANSETRON 4 MG: 2 INJECTION, SOLUTION INTRAMUSCULAR; INTRAVENOUS at 20:58

## 2025-02-20 RX ADMIN — PHENAZOPYRIDINE HYDROCHLORIDE 100 MG: 100 TABLET, FILM COATED ORAL at 09:36

## 2025-02-20 ASSESSMENT — ACTIVITIES OF DAILY LIVING (ADL)
CHANGE_IN_FUNCTIONAL_STATUS_SINCE_ONSET_OF_CURRENT_ILLNESS/INJURY: YES
ADLS_ACUITY_SCORE: 46
ADLS_ACUITY_SCORE: 46
ADLS_ACUITY_SCORE: 25
WALKING_OR_CLIMBING_STAIRS_DIFFICULTY: NO
ADLS_ACUITY_SCORE: 25
ADLS_ACUITY_SCORE: 46
WEAR_GLASSES_OR_BLIND: NO
DRESSING/BATHING_DIFFICULTY: NO
DIFFICULTY_EATING/SWALLOWING: NO
ADLS_ACUITY_SCORE: 46
ADLS_ACUITY_SCORE: 46
HEARING_DIFFICULTY_OR_DEAF: NO
FALL_HISTORY_WITHIN_LAST_SIX_MONTHS: NO
DOING_ERRANDS_INDEPENDENTLY_DIFFICULTY: NO
DIFFICULTY_COMMUNICATING: NO
CONCENTRATING,_REMEMBERING_OR_MAKING_DECISIONS_DIFFICULTY: NO
ADLS_ACUITY_SCORE: 46
ADLS_ACUITY_SCORE: 46
ADLS_ACUITY_SCORE: 20
TOILETING_ISSUES: NO

## 2025-02-20 ASSESSMENT — COLUMBIA-SUICIDE SEVERITY RATING SCALE - C-SSRS
2. HAVE YOU ACTUALLY HAD ANY THOUGHTS OF KILLING YOURSELF IN THE PAST MONTH?: NO
6. HAVE YOU EVER DONE ANYTHING, STARTED TO DO ANYTHING, OR PREPARED TO DO ANYTHING TO END YOUR LIFE?: NO
1. IN THE PAST MONTH, HAVE YOU WISHED YOU WERE DEAD OR WISHED YOU COULD GO TO SLEEP AND NOT WAKE UP?: NO

## 2025-02-20 NOTE — PROGRESS NOTES
UROLOGY  Formal consult to follow tomorrow.    Recent URS on 02/16/25. UA concerning for possible UTI.  Urine culture and blood cultures in process.  Patient has questionable fever initially and appeared nontoxic, but new rigors and confusion this afternoon causing return to ER.  Worsening WBC. Has been having dysuria and some degree of hematuria intermittently since ureteral stent placement this weekend.  Also found during his episode of nephrolithiasis was a greater than 5 cm left renal lesion.  Stent appeared to be in good position on KUB this morning.  CT imaging was obtained.  This was reviewed by myself and Dr. Ramos. Stent appears to be in good position.  Mild perinephric stranding around the left kidney consistent with likely pyelonephritis.  No areas concerning for abscess.     -Continue with IV antibiotics for concern for possible bacteremia in conjunction with pyelonephritis.  Follow cultures.  -As blood pressure tolerates, continue Flomax 0.4 mg once daily for stent discomfort.  -Continue to monitor WBC and kidney function.  -If some improvement in creatinine, can utilize Pyridium 100 mg 3 times daily as needed for dysuria.  -Continue with indwelling ureteral stent.  No plan to remove this right now, as this will likely worsen clinical picture.  -Will consider pushing back ureteral stent removal until patient has had additional antibiotics, as this is currently scheduled for 02/24/2025 (Monday).  -Patient will need to have pyelonephritis resolved prior to any surgical intervention for his left renal mass.  -No plans for acute urological intervention.    -Formal consult to follow in the kyara Mullins PA-C  [facility]   [phone]

## 2025-02-20 NOTE — ED TRIAGE NOTES
Pt arrives via EMS with c/o confusion and headache that started shortly after taking an oral abx. Pt was seen this AM and dx with a kidney stone. Pt reports feeling dizzy, is slow to answer questions but answers them appropriately. Pt breathing fast for EMS and slightly during triage.     Triage Assessment (Adult)       Row Name 02/20/25 1413          Triage Assessment    Airway WDL WDL        Respiratory WDL    Respiratory WDL X;rhythm/pattern     Rhythm/Pattern, Respiratory tachypneic        Skin Circulation/Temperature WDL    Skin Circulation/Temperature WDL WDL        Cardiac WDL    Cardiac WDL WDL        Peripheral/Neurovascular WDL    Peripheral Neurovascular WDL WDL        Cognitive/Neuro/Behavioral WDL    Cognitive/Neuro/Behavioral WDL X        Cookie Coma Scale    Best Eye Response 4-->(E4) spontaneous     Best Motor Response 6-->(M6) obeys commands     Best Verbal Response 5-->(V5) oriented     [place] Coma Scale Score 15

## 2025-02-20 NOTE — ED PROVIDER NOTES
"  Emergency Department Note      History of Present Illness     Chief Complaint   Flank Pain and Dizziness      HPI   Benedict De Anda is a 64 year old male with history of kidney stones who presents to the ED with flank pain and dizziness. Patient reports painful urination since his stent insertion for a left kidney stone 3 days ago on Monday.  He notes occasional blood clot passage.  He notes he is not in \"a lot of pain\", just uncomfortable, specifically reporting over the last couple of days chills.  He notes he has not been drinking much because he did not want to pee because it hurts to pee.  He notes associated lightheadedness when he stands up.  He reports a 102 fever as well which he is taking ibuprofen for, last took a couple hours ago.  He notes that he had been using a thermometer that he does not especially trust, temporal thermometer that is ranged from 101-105  He states he had one clot in his urine but other than that no hematuria.  He denies chest pain, shortness of breath, or leg swelling. States at the stent appointment they found renal cell carcinoma and he is under evaluation for this..       Independent Historian   None    Review of External Notes   I reviewed urologic progress note from 2/17/2025 in which patient was given plan for further outpatient evaluation.    Past Medical History     Medical History and Problem List   Allergic rhinitis  Herpes simplex     Medications   Ditropan   Flomax  Valtrex     Surgical History   Combined cystoscopy, retrogrades, ureteroscopy, laser holmium lithotripsy left ureter, insert stent  Knee scope with lateral release       Physical Exam     Patient Vitals for the past 24 hrs:   BP Temp Temp src Pulse Resp SpO2 Height Weight   02/20/25 0745 (!) 144/71 -- -- 78 18 94 % -- --   02/20/25 0653 -- 99.1  F (37.3  C) Oral -- -- -- -- --   02/20/25 0605 (!) 146/94 98.4  F (36.9  C) Oral 90 18 98 % 1.727 m (5' 8\") 85.8 kg (189 lb 2.5 oz)     Physical Exam  General: " Adult sitting upright  Eyes: PERRL, Conjunctive within normal limits.  No scleral icterus.  ENT: Moist mucous membranes, oropharynx clear.   CV: Normal S1S2, no murmur, rub or gallop. Regular rate and rhythm  Resp: Clear to auscultation bilaterally, no wheezes, rales or rhonchi. Normal respiratory effort.  GI: Abdomen is soft, nontender and nondistended. No palpable masses. No rebound or guarding.  No CVA tenderness to percussion.  MSK: No edema. Nontender. Normal active range of motion.  Skin: Warm and dry. No rashes or lesions or ecchymoses on visible skin.  Neuro: Alert and oriented. Responds appropriately to all questions and commands. No focal findings appreciated. Normal muscle tone.  Psych: Normal mood and affect. Pleasant.     Diagnostics     Lab Results   Labs Ordered and Resulted from Time of ED Arrival to Time of ED Departure   ROUTINE UA WITH MICROSCOPIC REFLEX TO CULTURE - Abnormal       Result Value    Color Urine Yellow      Appearance Urine Slightly Cloudy (*)     Glucose Urine Negative      Bilirubin Urine Negative      Ketones Urine Negative      Specific Gravity Urine 1.017      Blood Urine Large (*)     pH Urine 8.0 (*)     Protein Albumin Urine 50 (*)     Urobilinogen Urine Normal      Nitrite Urine Negative      Leukocyte Esterase Urine Large (*)     Mucus Urine Present (*)     RBC Urine >182 (*)     WBC Urine 105 (*)    BASIC METABOLIC PANEL - Abnormal    Sodium 137      Potassium 3.9      Chloride 104      Carbon Dioxide (CO2) 21 (*)     Anion Gap 12      Urea Nitrogen 16.5      Creatinine 1.22 (*)     GFR Estimate 67      Calcium 9.2      Glucose 132 (*)    CBC WITH PLATELETS AND DIFFERENTIAL - Abnormal    WBC Count 13.0 (*)     RBC Count 4.50      Hemoglobin 14.8      Hematocrit 42.2      MCV 94      MCH 32.9      MCHC 35.1      RDW 11.9      Platelet Count 173      % Neutrophils 84      % Lymphocytes 5      % Monocytes 9      % Eosinophils 0      % Basophils 0      % Immature Granulocytes  1      NRBCs per 100 WBC 0      Absolute Neutrophils 11.0 (*)     Absolute Lymphocytes 0.7 (*)     Absolute Monocytes 1.2      Absolute Eosinophils 0.0      Absolute Basophils 0.0      Absolute Immature Granulocytes 0.1      Absolute NRBCs 0.0     URINE CULTURE       Imaging   XR KUB   Preliminary Result   IMPRESSION: Left ureteral stent in place. Faint density overlying the sacrum near the stent noted, distal ureteral stone could have this appearance.          EKG   None    Independent Interpretation   X-ray KUB shows ureteral stent which appears to be in appropriate position    ED Course      Medications Administered   Medications   phenazopyridine (PYRIDIUM) tablet 100 mg (has no administration in time range)   sodium chloride 0.9% BOLUS 1,000 mL (0 mLs Intravenous Stopped 2/20/25 0736)   diphenhydrAMINE (BENADRYL) injection 25 mg (25 mg Intravenous $Given 2/20/25 0644)   sodium chloride 0.9% BOLUS 1,000 mL (0 mLs Intravenous Stopped 2/20/25 0902)       Procedures   None     Discussion of Management   Urology, Dr. Randhawa    ED Course   ED Course as of 02/20/25 0905   Thu Feb 20, 2025   0635 I obtained history and examined the patient as noted above.    0735 I rechecked the patient and explained findings.    0813 I spoke with POLLY Pride, urology, regarding the patient's presentation, findings, and plan of care.     0848      I reassessed the patient.  He denies any new concerns or new symptoms.  I discussed findings of plan and plan per urology after discussion with their team.    Additional Documentation  None    Medical Decision Making / Diagnosis     CMS Diagnoses: None    MIPS       None    [facility]   Benedict De Anda is a 64 year old male approximately 5 days status post left ureteral stent placement who presents emergency department with ongoing pain primarily with urination, passage of intermittent clots, dizziness, and chills with reports of fevers albeit with a reportedly unreliable thermometer from  102-105 at home.  He has been reducing his oral intake purposefully to avoid urination due to the pain associated.  Here in the emergency department, he is mildly hypertensive but not febrile or tachycardic.  He does not appear ill.  IV was placed and he was given IV hydration and blood work was checked.  His renal function is reassuringly downtrending from mild ANNALEE days ago.  He has slight leukocytosis.  Urinalysis shows significant hematuria and some pyuria, possibly reactive.  Urine culture is sent given reports of fevers.  KUB x-ray demonstrates what appears to be good placement of the renal stent and may be a distal stone fragment.  After discussion with urology, lower suspicion for infection in this clinical setting, however it is considered.  Urology recommended ciprofloxacin and Pyridium as he was not discharged with this as he could have been.  This should help with the dysuria.  Recommended drinking plenty of fluids to stay hydrated.  Discussed antiemetic use and antibiotic use which she was agreeable with.  He is recommended follow-up on Monday as scheduled or earlier if needed with urology.  Return precautions including ongoing fever (recommended to  a new oral thermometer), worsening pain, uncontrolled vomiting, etc. were discussed with the patient.  All questions were answered he was discharged home in stable improved condition.    Disposition   The patient was discharged.     Diagnosis     ICD-10-CM    1. Flank pain  R10.9     left sided      2. Dysuria  R30.0       3. Gross hematuria  R31.0       4. Ureteral stent present  Z96.0       5. Dizziness  R42            Discharge Medications   New Prescriptions    CIPROFLOXACIN (CIPRO) 500 MG TABLET    Take 1 tablet (500 mg) by mouth 2 times daily for 5 days.    ONDANSETRON (ZOFRAN ODT) 4 MG ODT TAB    Take 1 tablet (4 mg) by mouth every 8 hours as needed for nausea or vomiting.    PHENAZOPYRIDINE (PYRIDIUM) 100 MG TABLET    Take 1 tablet (100 mg) by  mouth 3 times daily as needed for urinary tract discomfort.         Scribe Disclosure:  I, Quintin Sergio, am serving as a scribe at 6:25 AM on 2/20/2025 to document services personally performed by Kerri Nguyen MD based on my observations and the provider's statements to me.        Kerri Nguyen MD  02/20/25 3083

## 2025-02-20 NOTE — ED PROVIDER NOTES
Emergency Department Note      History of Present Illness     Chief Complaint   Altered Mental Status      HPI   Benedict De Anda is a 64 year old male with history of kidney stone 4 days s/p left ureteral stent placement who presents to the ED via EMS for evaluation of altered mental status. The patient was seen here early this morning for flank pain and dizziness. He was discharged home with pyridium and Cipro. When he got home, he developed a fever and took ibuprofen with no relief. Martín states he has had a headache since yesterday and feels very chilled. Headache does not change with head movements side-to-side. He took the antibiotic and within 10 minutes was slurring his words, confused, and not making sense. He endorses some shortness of breath now and dysuria but this has been present since the stent placement. Patient denies abdominal pain, nausea, vomiting, chest pain, cough, pharyngitis, rhinorrhea, or new rash. Martín reports he is allergic to amoxicillin.           Independent Historian   None as detailed above.    Review of External Notes   Discharge summary February 17, 2025 for hydronephrosis and new kidney mass reviewed.    Past Medical History     Medical History and Problem List   Allergic rhinitis  Herpes simplex virus infection  Erectile dysfunction  Kidney stone  Left renal mass  ANNALEE   Lesion of pelvic bone     Medications   Cipro  Zofran  Ditropan  Pyridium  Viagra  Flomax  Valtrex     Surgical History   Combined cystoscopy, retrogrades, ureteroscopy, laser holmium lithotripsy ureter(s), insert stent   Knee scope, w/lateral release     Physical Exam     Patient Vitals for the past 24 hrs:   BP Temp Temp src Pulse Resp SpO2   02/20/25 1724 116/64 99.3  F (37.4  C) Oral 91 16 95 %   02/20/25 1658 104/53 -- -- -- -- 95 %   02/20/25 1415 (!) 144/64 98.6  F (37  C) Oral 84 18 97 %     Physical Exam  GENERAL: Patient appears fatigued, but nontoxic.  Speaking clearly.  HEAD: Atraumatic.  NECK: No  rigidity  CV: RRR, no murmurs, rubs or gallops  PULM: CTAB with good aeration; no retractions, rales, rhonchi, or wheezing  ABD: Soft, nontender, nondistended, no guarding  DERM: No rash. Skin warm and dry  EXTREMITY: Moving all extremities without difficulty. No calf tenderness or peripheral edema         Diagnostics     Lab Results   Labs Ordered and Resulted from Time of ED Arrival to Time of ED Departure   ISTAT GASES LACTATE VENOUS POCT - Abnormal       Result Value    Lactic Acid POCT 1.9      Bicarbonate Venous POCT 25      O2 Sat, Venous POCT 16 (*)     pCO2 Venous POCT 36 (*)     pH Venous POCT 7.44 (*)     pO2 Venous POCT 13 (*)    COMPREHENSIVE METABOLIC PANEL - Abnormal    Sodium 135      Potassium 4.0      Carbon Dioxide (CO2) 22      Anion Gap 12      Urea Nitrogen 15.4      Creatinine 1.36 (*)     GFR Estimate 58 (*)     Calcium 8.6 (*)     Chloride 101      Glucose 120 (*)     Alkaline Phosphatase 79      AST 28      ALT 26      Protein Total 6.7      Albumin 3.8      Bilirubin Total 1.7 (*)    CBC WITH PLATELETS AND DIFFERENTIAL - Abnormal    WBC Count 16.1 (*)     RBC Count 4.31 (*)     Hemoglobin 14.2      Hematocrit 41.1      MCV 95      MCH 32.9      MCHC 34.5      RDW 11.9      Platelet Count 158      % Neutrophils 91      % Lymphocytes 4      % Monocytes 4      % Eosinophils 0      % Basophils 0      % Immature Granulocytes 1      NRBCs per 100 WBC 0      Absolute Neutrophils 14.7 (*)     Absolute Lymphocytes 0.7 (*)     Absolute Monocytes 0.6      Absolute Eosinophils 0.0      Absolute Basophils 0.0      Absolute Immature Granulocytes 0.2      Absolute NRBCs 0.0     BLOOD CULTURE   BLOOD CULTURE       Imaging   CT Abdomen Pelvis w Contrast   Final Result   IMPRESSION:    1.  Interval placement of left double-J ureteral stent. Left-sided hydronephrosis has decreased slightly. No evidence of stone material along the stent. Perinephric and periureteral stranding and edema has increased slightly,  and could be related to    infection.   2.  Punctate nonobstructing stone in the lower pole left kidney.   3.  Stable heterogeneous mass arising from the posterior mid to lower left kidney concerning for renal cell carcinoma.             Independent Interpretation   None    ED Course      Medications Administered   Medications   sodium chloride 0.9% BOLUS 1,000 mL (0 mLs Intravenous Stopped 2/20/25 1722)   cefTRIAXone (ROCEPHIN) 2 g vial to attach to  ml bag for ADULTS or NS 50 ml bag for PEDS (0 g Intravenous Stopped 2/20/25 1645)   iopamidol (ISOVUE-370) solution 500 mL (95 mLs Intravenous $Given 2/20/25 1546)   sodium chloride (PF) 0.9% PF flush 100 mL (64 mLs Intravenous $Given 2/20/25 1546)       Procedures   Procedures     Discussion of Management   Admitting Hospitalist, Dr. Fam    ED Course   ED Course as of 02/20/25 1757   Thu Feb 20, 2025   1444 I obtained history and examined the patient as noted above.    1454 I spoke with Neha Mullins PA-C, Urology, regarding the patient's history and presentation in the emergency department today.     1506 I spoke with Dr. Fam, Hospitalist, regarding the patient's history and presentation in the emergency department today. Accepting patient for admission.        Additional Documentation  None    Medical Decision Making / Diagnosis        MDM   Benedict De Anda is a 64 year old male presents feeling unwell in the setting of a recent renal mass diagnosis and a ureteral stent.  Reviewed evaluation from this morning and recent discharge summary.  Vital signs on arrival here are overall reassuring.  He is having shaking chills and feels unwell, so I suspect he is developing a fever.  Did take antipyretic prior to arrival.  After seeing the patient, I started him on IV ceftriaxone 1500 and gave him IV fluids to treat for potential infection along his ureteral stent and/or developing bacteremia.  Leukocytosis is elevated from this morning.  Lactate within normal  limits.  Not hypotensive.  CT scan showing inflammation along his stent.  Discussed with urology.  They recommend continue antibiotics IV but they do not want to remove the stent.  Discussed with hospitalist and they will admit him for observation.  In regards to his slurred speech and confusion, I suspect from developing infection.  I do not suspect CVA.  He is moving all extremities without issue and speaking clearly for me.  He does not appear confused currently.  In regards to his headache, suspect secondary to the infection.  Do have blood cultures pending.  He has negative jolt accentuation sign.  Do not think he requires LP.    Disposition   The patient was admitted to the hospital.     Diagnosis     ICD-10-CM    1. Infection associated with indwelling ureteral stent, initial encounter  T83.592A       2. Complicated UTI (urinary tract infection)  N39.0            Discharge Medications   New Prescriptions    No medications on file         Scribe Disclosure:  I, Bindu Fischerstephy, am serving as a scribe at 2:56 PM on 2/20/2025 to document services personally performed by Rony Armstrong MD based on my observations and the provider's statements to me.        Rony Armstrong MD  02/20/25 2874

## 2025-02-20 NOTE — TELEPHONE ENCOUNTER
"Nurse Triage SBAR    Is this a 2nd Level Triage? YES, LICENSED PRACTITIONER REVIEW IS REQUIRED    Situation: Patient's wife and patient on phone. Wife reporting sudden confusion.     Background: Patient seen in ED today and prescribed cipro, zofran and pyridium.     2/16/25- Had left ureteral stent placed.     Assessment: Wife reports patient became very confused and saying things that did not make sense such as \"Need to get the blankies. Need to get them in the same direction of the gatorade\".   Patient states, \"I can't think right\". Patient able to speak clearly at time of call.     Reports confusion started at 11:28am, shortly after taking ciprofloxacin antibiotic at 11:15am.   States patient has not taken any other of his home medications today, just whatever was given at ED.     Wife reports patient is \"very sleepy\".     Endorses headache and wife reports patient is holding his head.     Denies difficulty breathing, trouble swallowing, or fever.     Protocol Recommended Disposition:   Call  Now    Recommendation: Advised to call  now. Patient declined and wife states she will drive patient to ER now.     Advised to call 911 if any new or worsening symptoms. Wife verbalized understanding and is in agreement with plan.      Reason for Disposition   Difficult to awaken or acting confused (disoriented, slurred speech) and new-onset    Additional Information   Negative: Difficult to awaken or acting confused (disoriented, slurred speech) and has diabetes mellitus    Answer Assessment - Initial Assessment Questions  1. LEVEL OF CONSCIOUSNESS: \"How is he (she, the patient) acting right now?\" (e.g., alert-oriented, confused, lethargic, stuporous, comatose)      Confused, \"can't think straight\"   2. ONSET: \"When did the confusion start?\"  (minutes, hours, days)      Around 11:30am   3. PATTERN \"Does this come and go, or has it been constant since it started?\"  \"Is it present now?\"      Constant   4. " "ALCOHOL or DRUGS: \"Has he been drinking alcohol or taking any drugs?\"       Ciprofloxacin only medication taken today  5. NARCOTIC MEDICINES: \"Has he been receiving any narcotic medications?\" (e.g., morphine, Vicodin)      Not today- had some Sunday   6. CAUSE: \"What do you think is causing the confusion?\"       Cipro?  7. OTHER SYMPTOMS: \"Are there any other symptoms?\" (e.g., difficulty breathing, headache, fever, weakness)      No difficulty, fever, weakness.   Yes has headache    Protocols used: Confusion - Delirium-A-OH    Germaine Baires RN  [facility]  "

## 2025-02-20 NOTE — PROGRESS NOTES
"Care Coordination Initial Assessment    The patient was admitted into [facility] on 2/16/25 for a kidney stone on left side. He was discharged on 2/17/25 with instructions to follow up with urology and with PCP within 7 days of his discharge date.     PCP: Redd Tejada    Referral Source:  ED/IP List    Utilization:   Last PCP Appt.: 12/10/24    Health Maintenance Reviewed: Yes    Current Medical Health Concerns:   Please review patients current medical problem list    Patient/Caregiver Understanding: yes    Medication Management:   Patient has understanding of regimen and is adherent:  Yes    Functional Status:   Independent with all ADL/IADL's    Current Behavioral Health Concerns:   No concerns-patient just feels very tired overall and still not feeling the best-he did have to go to ER today due to this     Patient/Caregiver Understanding:  Yes    Psychosocial:  Living Situation:  Lives at home with wife     Gaps:    NA    Resources Given:    The patient did not need anything currently-he did have to go to the ER today due to feeling very weak and not well     Plan:   I called the patient to see how he is doing and he stated that he is still not doing very well. He was seen at the ER today because of feeling very \"off\" still and he just wants to rest. He declined scheduling a hospital follow up visit at this time but stated that he will call back to get scheduled once he is feeling better.         "

## 2025-02-20 NOTE — PHARMACY-ADMISSION MEDICATION HISTORY
Pharmacy Intern Admission Medication History    Admission medication history is complete. The information provided in this note is only as accurate as the sources available at the time of the update.    Information Source(s): Patient, Family member, and CareEverywhere/SureScripts via in-person    Pertinent Information: Patient started a 5 day course of Ciprofloxacin BID today and took 1 dose so far this morning.     Changes made to PTA medication list:  Added: None  Deleted: None  Changed: None    Allergies reviewed with patient and updates made in EHR: yes    Medication History Completed By: Jared Gandhi 2/20/2025 5:46 PM    PTA Med List   Medication Sig Last Dose/Taking    ciprofloxacin (CIPRO) 500 MG tablet Take 1 tablet (500 mg) by mouth 2 times daily for 5 days. 2/20/2025 Morning    ondansetron (ZOFRAN ODT) 4 MG ODT tab Take 1 tablet (4 mg) by mouth every 8 hours as needed for nausea or vomiting. Unknown    oxyBUTYnin (DITROPAN) 5 MG tablet Take 1 tablet (5 mg) by mouth 3 times daily as needed for bladder spasms. 2/19/2025 Noon    phenazopyridine (PYRIDIUM) 100 MG tablet Take 1 tablet (100 mg) by mouth 3 times daily as needed for urinary tract discomfort. Unknown    sildenafil (VIAGRA) 100 MG tablet Take 1 tablet (100 mg) by mouth daily as needed (for ED). Unknown    tamsulosin (FLOMAX) 0.4 MG capsule Take 1 capsule (0.4 mg) by mouth daily. 2/19/2025 Morning    valACYclovir (VALTREX) 500 MG tablet Take 1 tablet (500 mg) by mouth daily. 2/19/2025 Morning

## 2025-02-20 NOTE — ED TRIAGE NOTES
Arrives from home with wife. States had a stent placed left kidney this past Sunday. The past two days has had chills. Woke up this morning feeling very chilled and very dizzy. Pale. Left flank pain 3/10. Last took Ibuprofen around two hours ago, as home thermometer was 102 F.      Triage Assessment (Adult)       Row Name 02/20/25 0607          Triage Assessment    Airway WDL WDL        Respiratory WDL    Respiratory WDL WDL        Skin Circulation/Temperature WDL    Skin Circulation/Temperature WDL WDL        Cardiac WDL    Cardiac WDL WDL        Peripheral/Neurovascular WDL    Peripheral Neurovascular WDL WDL        Cognitive/Neuro/Behavioral WDL    Cognitive/Neuro/Behavioral WDL WDL

## 2025-02-20 NOTE — TELEPHONE ENCOUNTER
This patient goes to [facility], not [facility].

## 2025-02-20 NOTE — H&P
[facility]  History and Physical  Hospitalist - Humberto Fam DO       Date of Admission:  2/20/2025    Chief Complaint   Headache, chills    History is obtained from the patient, the patient's wife at bedside, the emergency department physician, as well as electronic medical record.    History of Present Illness   Benedict De Anda is a 64 year old male with past medical history of HSV, depression, recently diagnosed left kidney mass who presented on 2/20/2025 with chief complaint of headache, chills, confusion.  Of note, the patient was recently hospitalized from 2/16 to 2/17 with the diagnosis of left-sided kidney stone with hydronephrosis and new left kidney mass.  The patient underwent cystourethroscopy with laser lithotripsy with basket stone extraction and left ureteral stent placement on 2/16/2025.  Patient states he was feeling well at the time of discharge but was still having some left-sided flank pain.  He notes he was also having some hematuria and did pass a blood clot last evening.  Today he developed a headache and chills and came to the emergency department for evaluation.  He was diagnosed with a suspected urinary tract infection and discharged with ciprofloxacin.  Unfortunately he became more confused and family became concerned so they called EMS and he was brought back for evaluation.  The patient states that he urinated just before coming to the hospital.  He reports left kidney pain with urination and increasing urinary frequency over the past week or so while dealing with a kidney stone.  He admits to chills at home, but denies any fevers.    In the emergency department, patient is on IV temperature 98.6  F, heart rate 84, blood pressure 144/64, respiratory rate 18, SpO2 97% on room air.  Lab work showed BUN/creatinine 15.4/1.36, lactic acid 1.9, WBC 16.1.  Urinalysis this morning revealed large blood, large leukocyte esterase, 105 WBC, greater than 182 RBC.  Urology was  contacted by the emergency department who recommended no acute procedural interventions.  The patient was started on IV fluids, IV ceftriaxone in the setting of complicated urinary tract infection.  Urology was consulted to see patient.    ASSESSMENT/PLAN    Complicated Urinary Tract Infection  L Ureteral Stone s/p Lithotripsy and L Ureteral Stent on 2/16/2025  - Appreciate Urology recommendations  - Ceftriaxone 2 gm given his chills with concern for bacteremia  - Blood cultures pending  - Monitor urine output and signs of urethral obstruction given his blood clots  - Continue tamsulosin  - IVF  - Urine culture pending    Chronic Medical Problems:  Suspected Chronic Kidney Disease Stage 2-3a - Baseline Cr = 1.3  Hx of HSV  Depression/Anxiety    PLAN: Resume home medications as appropriate once confirmed by pharmacy.     I spent 48 minutes in reviewing this patient's labs, imaging, medications, medical history.  In addition time was spent interviewing the patient, communicating with family, and medical decision making.     DVT Prophylaxis: Pneumatic Compression Devices  Code Status: Full Code  Disposition: Medically Ready for Discharge: Anticipated in 2-4 Days    Goals to discharge include: abx plan established, urologic symptoms improved    Humberto Fam DO  Securely message with Vocera (more info)  Text page via [employer] Paging/Directory     Primary Care Physician   Redd Tejada    -----------------------------------------------------------------------------------------------------------------------------------------------------------------------------------------------------    Past Medical History    I have reviewed this patient's medical history and updated it with pertinent information if needed.   Past Medical History:   Diagnosis Date    Allergic rhinitis, cause unspecified 8/22/2005    testing 2006    Herpes simplex without mention of complication 8/22/2005       Past Surgical History   I have  reviewed this patient's surgical history and updated it with pertinent information if needed.  Past Surgical History:   Procedure Laterality Date    COMBINED CYSTOSCOPY, RETROGRADES, URETEROSCOPY, LASER HOLMIUM LITHOTRIPSY URETER(S), INSERT STENT Left 2/16/2025    Procedure: Cystourethroscopy Left ureteroscopy Left retrograde pyelogram with interpretation of intraoperative fluoroscopic imaging Holmium laser lithotripsy with basket stone extraction Left ureteral stent placement;  Surgeon: Xena Nj MD;  Location:  OR     KNEE SCOPE, W/LATERAL RELEASE  1992    knee spur       Prior to Admission Medications   Prior to Admission Medications   Prescriptions Last Dose Informant Patient Reported? Taking?   ciprofloxacin (CIPRO) 500 MG tablet   No No   Sig: Take 1 tablet (500 mg) by mouth 2 times daily for 5 days.   ondansetron (ZOFRAN ODT) 4 MG ODT tab   No No   Sig: Take 1 tablet (4 mg) by mouth every 8 hours as needed for nausea or vomiting.   oxyBUTYnin (DITROPAN) 5 MG tablet   No No   Sig: Take 1 tablet (5 mg) by mouth 3 times daily as needed for bladder spasms.   phenazopyridine (PYRIDIUM) 100 MG tablet   No No   Sig: Take 1 tablet (100 mg) by mouth 3 times daily as needed for urinary tract discomfort.   sildenafil (VIAGRA) 100 MG tablet   No No   Sig: Take 1 tablet (100 mg) by mouth daily as needed (for ED).   tamsulosin (FLOMAX) 0.4 MG capsule   No No   Sig: Take 1 capsule (0.4 mg) by mouth daily.   valACYclovir (VALTREX) 500 MG tablet   No No   Sig: Take 1 tablet (500 mg) by mouth daily.      Facility-Administered Medications: None     Allergies   Allergies   Allergen Reactions    Amoxicillin Hives       Social History   I have reviewed this patient's social history and updated it with pertinent information if needed. Benedict De Anda  reports that he has quit smoking. He has been exposed to tobacco smoke. He has never used smokeless tobacco. He reports current alcohol use of about 4.0 standard drinks  of alcohol per week. He reports that he does not currently use drugs after having used the following drugs: Marijuana.    Family History   I have reviewed this patient's family history and updated it with pertinent information if needed.   Family History   Problem Relation Age of Onset    C.A.D. Father         bypass    Hypertension Father     Lipids Father     Diabetes No family hx of        -----------------------------------------------------------------------------------------------------------------------------------------------------------------------------------------------------    Review of Systems   The 10 point Review of Systems is negative other than noted in the HPI or here.     Physical Exam   Temp: 98.6  F (37  C) Temp src: Oral BP: (!) 144/64 Pulse: 84   Resp: 18 SpO2: 97 % O2 Device: None (Room air)    Vital Signs with Ranges  Temp:  [98.4  F (36.9  C)-99.1  F (37.3  C)] 98.6  F (37  C)  Pulse:  [77-90] 84  Resp:  [18] 18  BP: (144-146)/(64-94) 144/64  SpO2:  [94 %-98 %] 97 %  0 lbs 0 oz    Constitutional: Awake, alert, cooperative, no apparent distress.  Eyes: Conjunctiva and pupils examined and normal.  HEENT: Moist mucous membranes, normal dentition.  Respiratory: Clear to auscultation bilaterally, no crackles or wheezing.  Cardiovascular: Regular rate and rhythm, normal S1 and S2, and no murmur noted.  GI: Soft, non-distended, non-tender, normal bowel sounds.  Lymph/Hematologic: No anterior cervical or supraclavicular adenopathy.  Skin: No rashes, no cyanosis, no edema.  Musculoskeletal: No joint swelling, erythema or tenderness.  Neurologic: Cranial nerves 2-12 intact, normal strength and sensation.  Psychiatric: Alert, oriented to person, place and time, no obvious anxiety or depression.     Data     Recent Labs   Lab 02/20/25  1434 02/20/25  0618 02/17/25  1000 02/17/25  0618   WBC 16.1* 13.0*  --  11.1*   HGB 14.2 14.8  --  13.5   MCV 95 94  --  94    173  --  172    137 138  --     POTASSIUM 4.0 3.9 3.9  --    CHLORIDE 101 104 105  --    CO2 22 21* 23  --    BUN 15.4 16.5 18.8  --    CR 1.36* 1.22* 1.32*  --    ANIONGAP 12 12 10  --    EMMA 8.6* 9.2 8.8  --    * 132* 115*  --    ALBUMIN 3.8  --   --   --    PROTTOTAL 6.7  --   --   --    BILITOTAL 1.7*  --   --   --    ALKPHOS 79  --   --   --    ALT 26  --   --   --    AST 28  --   --   --        Recent Results (from the past 24 hours)   XR KUB    Narrative    EXAM: XR KUB  LOCATION: [facility]  DATE: 2/20/2025    INDICATION: Left flank pain, history of stent placement.  COMPARISON: None.      Impression    IMPRESSION: Left ureteral stent in place. Faint density overlying the sacrum near the stent noted, distal ureteral stone could have this appearance.

## 2025-02-20 NOTE — DISCHARGE INSTRUCTIONS
It is likely that all of your symptoms can be explained by the stent.  Pyridium may help with the painful urination.  You should drink plenty of fluids to help with painful urination and hydration.    Please buy a new thermometer with which you can measure an oral temperature.    Continue with care as per your urologist.  Continue Flomax and oxybutynin.  Use ciprofloxacin for potential infection, although your symptoms seem more likely related to post reactive changes rather than acute infection.    Return to the emergency department with worsening of pain, uncontrolled vomiting, or fevers despite antibiotics after 24 to 48 hours of antibiotic use.

## 2025-02-20 NOTE — ED NOTES
[facility]  ED Nurse Handoff Report    ED Chief complaint: Altered Mental Status  . ED Diagnosis:   Final diagnoses:   Infection associated with indwelling ureteral stent, initial encounter       Allergies:   Allergies   Allergen Reactions    Amoxicillin Hives       Code Status: Full Code    Activity level - Baseline/Home:  independent.  Activity Level - Current:   standby.   Lift room needed: No.   Bariatric: No   Needed: No   Isolation: No.   Infection: Not Applicable.     Respiratory status: Room air    Vital Signs (within 30 minutes):   Vitals:    02/20/25 1415   BP: (!) 144/64   Pulse: 84   Resp: 18   Temp: 98.6  F (37  C)   TempSrc: Oral   SpO2: 97%       Cardiac Rhythm:  ,      Pain level:    Patient confused: No.   Patient Falls Risk: nonskid shoes/slippers when out of bed and patient and family education.   Elimination Status:  due to void      Patient Report - Initial Complaint: altered mental status.   Focused Assessment: 64 year old male with history of kidney stone 4 days s/p left ureteral stent placement who presents to the ED via EMS for evaluation of altered mental status. The patient was seen here early this morning for flank pain and dizziness. He was discharged home with pyridium and Cipro. When he got home, he developed a fever and took ibuprofen with no relief. Martín states he has had a headache since yesterday and feels very chilled. Headache does not change with head movements side-to-side. He took the antibiotic and within 10 minutes was slurring his words, confused, and not making sense. He endorses some shortness of breath and dysuria but this has been present since the stent placement. Patient denies abdominal pain, nausea, vomiting, chest pain, cough, pharyngitis, rhinorrhea, or new rash. Martín reports he is allergic to amoxicillin.            Abnormal Results:   Labs Ordered and Resulted from Time of ED Arrival to Time of ED Departure   ISTAT GASES LACTATE  VENOUS POCT - Abnormal       Result Value    Lactic Acid POCT 1.9      Bicarbonate Venous POCT 25      O2 Sat, Venous POCT 16 (*)     pCO2 Venous POCT 36 (*)     pH Venous POCT 7.44 (*)     pO2 Venous POCT 13 (*)    CBC WITH PLATELETS AND DIFFERENTIAL - Abnormal    WBC Count 16.1 (*)     RBC Count 4.31 (*)     Hemoglobin 14.2      Hematocrit 41.1      MCV 95      MCH 32.9      MCHC 34.5      RDW 11.9      Platelet Count 158      % Neutrophils 91      % Lymphocytes 4      % Monocytes 4      % Eosinophils 0      % Basophils 0      % Immature Granulocytes 1      NRBCs per 100 WBC 0      Absolute Neutrophils 14.7 (*)     Absolute Lymphocytes 0.7 (*)     Absolute Monocytes 0.6      Absolute Eosinophils 0.0      Absolute Basophils 0.0      Absolute Immature Granulocytes 0.2      Absolute NRBCs 0.0     COMPREHENSIVE METABOLIC PANEL   BLOOD CULTURE   BLOOD CULTURE        CT Abdomen Pelvis w Contrast    (Results Pending)       Treatments provided: see EMR  Family Comments: involved and supportive  OBS brochure/video discussed/provided to patient:  N/A  ED Medications:   Medications   sodium chloride 0.9% BOLUS 1,000 mL (1,000 mLs Intravenous $New Bag 2/20/25 1501)   cefTRIAXone (ROCEPHIN) 1 g vial to attach to  mL bag for ADULTS or NS 50 mL bag for PEDS (has no administration in time range)       Drips infusing:  No  For the majority of the shift this patient was Green.   Interventions performed were NA.    Sepsis treatment initiated: No    Cares/treatment/interventions/medications to be completed following ED care: see inpatient orders    ED Nurse Name: Yas Powers RN  3:08 PM   RECEIVING UNIT ED HANDOFF REVIEW    Above ED Nurse Handoff Report was reviewed: Yes  Reviewed by: Jaida Quintanilla RN on February 20, 2025 at 5:37 PM   PAVITHRA Sumner called the ED to inform them the note was read: Yes

## 2025-02-21 LAB
ANION GAP SERPL CALCULATED.3IONS-SCNC: 12 MMOL/L (ref 7–15)
ANION GAP SERPL CALCULATED.3IONS-SCNC: 12 MMOL/L (ref 7–15)
BASE EXCESS BLDV CALC-SCNC: -1.2 MMOL/L (ref -3–3)
BUN SERPL-MCNC: 17 MG/DL (ref 8–23)
BUN SERPL-MCNC: 18.7 MG/DL (ref 8–23)
CALCIUM SERPL-MCNC: 7.2 MG/DL (ref 8.8–10.4)
CALCIUM SERPL-MCNC: 7.6 MG/DL (ref 8.8–10.4)
CHLORIDE SERPL-SCNC: 102 MMOL/L (ref 98–107)
CHLORIDE SERPL-SCNC: 104 MMOL/L (ref 98–107)
CREAT SERPL-MCNC: 1.41 MG/DL (ref 0.67–1.17)
CREAT SERPL-MCNC: 1.52 MG/DL (ref 0.67–1.17)
EGFRCR SERPLBLD CKD-EPI 2021: 51 ML/MIN/1.73M2
EGFRCR SERPLBLD CKD-EPI 2021: 56 ML/MIN/1.73M2
ERYTHROCYTE [DISTWIDTH] IN BLOOD BY AUTOMATED COUNT: 12 % (ref 10–15)
GLUCOSE SERPL-MCNC: 114 MG/DL (ref 70–99)
GLUCOSE SERPL-MCNC: 125 MG/DL (ref 70–99)
HCO3 BLDV-SCNC: 23 MMOL/L (ref 21–28)
HCO3 SERPL-SCNC: 18 MMOL/L (ref 22–29)
HCO3 SERPL-SCNC: 20 MMOL/L (ref 22–29)
HCT VFR BLD AUTO: 34.5 % (ref 40–53)
HGB BLD-MCNC: 11.9 G/DL (ref 13.3–17.7)
LACTATE SERPL-SCNC: 1.7 MMOL/L (ref 0.7–2)
MAGNESIUM SERPL-MCNC: 1.4 MG/DL (ref 1.7–2.3)
MCH RBC QN AUTO: 32.3 PG (ref 26.5–33)
MCHC RBC AUTO-ENTMCNC: 34.5 G/DL (ref 31.5–36.5)
MCV RBC AUTO: 94 FL (ref 78–100)
O2/TOTAL GAS SETTING VFR VENT: 0 %
OXYHGB MFR BLDV: 40 % (ref 70–75)
PCO2 BLDV: 35 MM HG (ref 40–50)
PH BLDV: 7.42 [PH] (ref 7.32–7.43)
PHOSPHATE SERPL-MCNC: 1.7 MG/DL (ref 2.5–4.5)
PLATELET # BLD AUTO: 142 10E3/UL (ref 150–450)
PO2 BLDV: 24 MM HG (ref 25–47)
POTASSIUM SERPL-SCNC: 3.3 MMOL/L (ref 3.4–5.3)
POTASSIUM SERPL-SCNC: 3.9 MMOL/L (ref 3.4–5.3)
RBC # BLD AUTO: 3.68 10E6/UL (ref 4.4–5.9)
SAO2 % BLDV: 41 % (ref 70–75)
SODIUM SERPL-SCNC: 132 MMOL/L (ref 135–145)
SODIUM SERPL-SCNC: 136 MMOL/L (ref 135–145)
WBC # BLD AUTO: 15.9 10E3/UL (ref 4–11)

## 2025-02-21 PROCEDURE — 99222 1ST HOSP IP/OBS MODERATE 55: CPT | Mod: FS | Performed by: STUDENT IN AN ORGANIZED HEALTH CARE EDUCATION/TRAINING PROGRAM

## 2025-02-21 PROCEDURE — 83735 ASSAY OF MAGNESIUM: CPT | Performed by: INTERNAL MEDICINE

## 2025-02-21 PROCEDURE — 999N000157 HC STATISTIC RCP TIME EA 10 MIN

## 2025-02-21 PROCEDURE — 99233 SBSQ HOSP IP/OBS HIGH 50: CPT | Performed by: INTERNAL MEDICINE

## 2025-02-21 PROCEDURE — 250N000011 HC RX IP 250 OP 636: Performed by: INTERNAL MEDICINE

## 2025-02-21 PROCEDURE — 250N000013 HC RX MED GY IP 250 OP 250 PS 637: Performed by: INTERNAL MEDICINE

## 2025-02-21 PROCEDURE — 36415 COLL VENOUS BLD VENIPUNCTURE: CPT | Performed by: INTERNAL MEDICINE

## 2025-02-21 PROCEDURE — 120N000004 HC R&B MS OVERFLOW

## 2025-02-21 PROCEDURE — 80048 BASIC METABOLIC PNL TOTAL CA: CPT | Performed by: INTERNAL MEDICINE

## 2025-02-21 PROCEDURE — 258N000003 HC RX IP 258 OP 636: Performed by: INTERNAL MEDICINE

## 2025-02-21 PROCEDURE — 250N000009 HC RX 250: Performed by: INTERNAL MEDICINE

## 2025-02-21 PROCEDURE — 82805 BLOOD GASES W/O2 SATURATION: CPT | Performed by: INTERNAL MEDICINE

## 2025-02-21 PROCEDURE — 85014 HEMATOCRIT: CPT | Performed by: INTERNAL MEDICINE

## 2025-02-21 PROCEDURE — 83605 ASSAY OF LACTIC ACID: CPT | Performed by: INTERNAL MEDICINE

## 2025-02-21 PROCEDURE — 84100 ASSAY OF PHOSPHORUS: CPT | Performed by: INTERNAL MEDICINE

## 2025-02-21 PROCEDURE — 94640 AIRWAY INHALATION TREATMENT: CPT

## 2025-02-21 RX ORDER — POTASSIUM CHLORIDE 1500 MG/1
40 TABLET, EXTENDED RELEASE ORAL ONCE
Status: COMPLETED | OUTPATIENT
Start: 2025-02-21 | End: 2025-02-21

## 2025-02-21 RX ORDER — PROCHLORPERAZINE MALEATE 5 MG/1
10 TABLET ORAL EVERY 6 HOURS PRN
Status: DISCONTINUED | OUTPATIENT
Start: 2025-02-21 | End: 2025-02-25 | Stop reason: HOSPADM

## 2025-02-21 RX ORDER — HYDROMORPHONE HYDROCHLORIDE 1 MG/ML
0.5 INJECTION, SOLUTION INTRAMUSCULAR; INTRAVENOUS; SUBCUTANEOUS
Status: DISCONTINUED | OUTPATIENT
Start: 2025-02-21 | End: 2025-02-25 | Stop reason: HOSPADM

## 2025-02-21 RX ORDER — HYDROMORPHONE HYDROCHLORIDE 1 MG/ML
0.3 INJECTION, SOLUTION INTRAMUSCULAR; INTRAVENOUS; SUBCUTANEOUS
Status: DISCONTINUED | OUTPATIENT
Start: 2025-02-21 | End: 2025-02-22

## 2025-02-21 RX ORDER — POTASSIUM CHLORIDE 1500 MG/1
20 TABLET, EXTENDED RELEASE ORAL ONCE
Status: COMPLETED | OUTPATIENT
Start: 2025-02-21 | End: 2025-02-21

## 2025-02-21 RX ORDER — ACETAMINOPHEN 325 MG/1
975 TABLET ORAL EVERY 8 HOURS
Status: DISCONTINUED | OUTPATIENT
Start: 2025-02-21 | End: 2025-02-25 | Stop reason: HOSPADM

## 2025-02-21 RX ORDER — DEXTROSE MONOHYDRATE, SODIUM CHLORIDE, AND POTASSIUM CHLORIDE 50; 1.49; 4.5 G/1000ML; G/1000ML; G/1000ML
INJECTION, SOLUTION INTRAVENOUS CONTINUOUS
Status: DISCONTINUED | OUTPATIENT
Start: 2025-02-21 | End: 2025-02-22

## 2025-02-21 RX ORDER — ALBUTEROL SULFATE 0.83 MG/ML
2.5 SOLUTION RESPIRATORY (INHALATION)
Status: DISCONTINUED | OUTPATIENT
Start: 2025-02-21 | End: 2025-02-25 | Stop reason: HOSPADM

## 2025-02-21 RX ADMIN — HYDROMORPHONE HYDROCHLORIDE 2 MG: 2 TABLET ORAL at 06:13

## 2025-02-21 RX ADMIN — OXYBUTYNIN CHLORIDE 5 MG: 5 TABLET ORAL at 06:13

## 2025-02-21 RX ADMIN — POTASSIUM CHLORIDE, DEXTROSE MONOHYDRATE AND SODIUM CHLORIDE: 150; 5; 450 INJECTION, SOLUTION INTRAVENOUS at 19:00

## 2025-02-21 RX ADMIN — VANCOMYCIN HYDROCHLORIDE 1750 MG: 5 INJECTION, POWDER, LYOPHILIZED, FOR SOLUTION INTRAVENOUS at 19:00

## 2025-02-21 RX ADMIN — ACETAMINOPHEN 650 MG: 325 TABLET, FILM COATED ORAL at 01:05

## 2025-02-21 RX ADMIN — CALCIUM CARBONATE (ANTACID) CHEW TAB 500 MG 1000 MG: 500 CHEW TAB at 21:57

## 2025-02-21 RX ADMIN — SODIUM CHLORIDE 1000 ML: 9 INJECTION, SOLUTION INTRAVENOUS at 15:26

## 2025-02-21 RX ADMIN — CALCIUM CARBONATE (ANTACID) CHEW TAB 500 MG 1000 MG: 500 CHEW TAB at 16:34

## 2025-02-21 RX ADMIN — ACETAMINOPHEN 975 MG: 325 TABLET, FILM COATED ORAL at 15:21

## 2025-02-21 RX ADMIN — ONDANSETRON 4 MG: 4 TABLET, ORALLY DISINTEGRATING ORAL at 18:06

## 2025-02-21 RX ADMIN — ALBUTEROL SULFATE 2.5 MG: 2.5 SOLUTION RESPIRATORY (INHALATION) at 16:55

## 2025-02-21 RX ADMIN — TAMSULOSIN HYDROCHLORIDE 0.4 MG: 0.4 CAPSULE ORAL at 08:55

## 2025-02-21 RX ADMIN — AZITHROMYCIN MONOHYDRATE 500 MG: 500 INJECTION, POWDER, LYOPHILIZED, FOR SOLUTION INTRAVENOUS at 17:33

## 2025-02-21 RX ADMIN — HYDROMORPHONE HYDROCHLORIDE 2 MG: 2 TABLET ORAL at 01:05

## 2025-02-21 RX ADMIN — POLYETHYLENE GLYCOL 3350 17 G: 17 POWDER, FOR SOLUTION ORAL at 08:55

## 2025-02-21 RX ADMIN — POTASSIUM CHLORIDE 40 MEQ: 1500 TABLET, EXTENDED RELEASE ORAL at 20:11

## 2025-02-21 RX ADMIN — ACETAMINOPHEN 650 MG: 325 TABLET, FILM COATED ORAL at 06:13

## 2025-02-21 RX ADMIN — HYDROMORPHONE HYDROCHLORIDE 0.4 MG: 0.2 INJECTION, SOLUTION INTRAMUSCULAR; INTRAVENOUS; SUBCUTANEOUS at 15:22

## 2025-02-21 RX ADMIN — PROCHLORPERAZINE EDISYLATE 10 MG: 5 INJECTION INTRAMUSCULAR; INTRAVENOUS at 19:57

## 2025-02-21 RX ADMIN — CEFTRIAXONE 2 G: 2 INJECTION, POWDER, FOR SOLUTION INTRAMUSCULAR; INTRAVENOUS at 16:51

## 2025-02-21 RX ADMIN — SODIUM CHLORIDE, PRESERVATIVE FREE: 5 INJECTION INTRAVENOUS at 06:00

## 2025-02-21 RX ADMIN — POTASSIUM CHLORIDE 20 MEQ: 1500 TABLET, EXTENDED RELEASE ORAL at 20:25

## 2025-02-21 RX ADMIN — VALACYCLOVIR HYDROCHLORIDE 500 MG: 500 TABLET, FILM COATED ORAL at 08:55

## 2025-02-21 ASSESSMENT — ACTIVITIES OF DAILY LIVING (ADL)
ADLS_ACUITY_SCORE: 29
ADLS_ACUITY_SCORE: 35
ADLS_ACUITY_SCORE: 29
ADLS_ACUITY_SCORE: 31
ADLS_ACUITY_SCORE: 25
ADLS_ACUITY_SCORE: 31
ADLS_ACUITY_SCORE: 29

## 2025-02-21 NOTE — PROVIDER NOTIFICATION
Eric Dawn, Dr. Florian: FYI: Temp 100.9 and RR 32, mild rigors. I plan to give Tylenol shortly and will continue to monitor.

## 2025-02-21 NOTE — PROGRESS NOTES
"[facility]    Medicine Progress Note - Hospitalist Service    Date of Admission:  2/20/2025    Assessment & Plan   Benedict De Anda is a 64 year old male who came to attention on 2/20/2025 due to concern for new and rather abrupt onset of confusion and headache.      His recent medical history includes hospitalization from 2/16-2/17 left ureteral stent placement with laser lithotripsy with basket stone extraction for management of a 6 x 4 mm calculus in the proximal left ureter.  He was also incidentally noted to have a 5 cm heterogeneously enhancing mass in the left kidney compatible with renal cell carcinoma.  The patient then returned to the emergency department on 2/20/2025 early in the morning with concern for flank pain and dizziness at which point he reported fever and dysuria.  Urology was contacted but patient was discharged home with ciprofloxacin.  It was in this setting that the patient had return to the emergency department with confusion and headache.    PMH includes nephrolithiasis.    On presentation to the emergency department, VS: /64 came down to 104/53 over the course of about 1/2-hour.  HR 91, RR 16 with oxygen saturation 95% breathing room air.  Temperature 98.6.  Exam: Patient was described as \"fatigued but nontoxic\"-appearing.  Labs: Creatinine 1.36 with normal electrolytes.  Total bilirubin 1.7.  Glucose 120.  Lactic acid 1.9.  VBG pH 7.44 with pCO2 36.  WBC 16.1, Hgb 14.2, .  Urinalysis from earlier in the day was strongly positive and had been sent for urine culture.  Blood cultures were also obtained in the ED at the time of admission.  Imaging: CT abdomen and pelvis with contrast shows \"Interval placement of left double-J ureteral stent. Left-sided hydronephrosis has decreased slightly. No evidence of stone material along the stent. Perinephric and periureteral stranding and edema has increased slightly, and could be related to   infection...\"   " "Adilson was started on Ceftriaxone 2 g IV q 24 hours and NS at 100 ml/hr.    DX:  Sepsis with mild acute kidney injury, assoc infectious encephalopathy, elevated white cell count and fevers.  These are believed to be due to complicated urinary tract infection.  He has grown an Enterococcus from the urine, but I am concerned that he may have had a GNR that we have been unable to culture due to his exposure to antibiotics.    Complicated urinary tract infection status post left ureteral instrumentation and lithotripsy.  Acute kidney injury.  Baseline creatinine 1.1.  Very mild acute encephalopathy. Although this is mentioned by family it would be compatible with the moderate sepsis syndrome (i.e. infectious encephalopathy), it will be managed by treating the UTI.  GIA/MDD.  PLAN:  Given a liter of NS.  (This is about the 6th liter of crystalloid in 2 days.) Now on D5 0.45 NS with 20 mEq KCl at 125 ml/hr.   Start Vanco to cover for the Enterococcus. Pt describes significant PCN allergy.    Consider involving ID.   Strict I & O.          Diet: Combination Diet Regular Diet Adult    DVT Prophylaxis: Pneumatic Compression Devices  Lopez Catheter: Not present  Lines: None     Cardiac Monitoring: None  Code Status: Full Code      Clinically Significant Risk Factors Present on Admission           # Hypocalcemia: Lowest Ca = 7.6 mg/dL in last 2 days, will monitor and replace as appropriate                   # Overweight: Estimated body mass index is 29.33 kg/m  as calculated from the following:    Height as of this encounter: 1.727 m (5' 8\").    Weight as of this encounter: 87.5 kg (192 lb 14.4 oz).              Social Drivers of Health    Tobacco Use: Medium Risk (12/10/2024)    Patient History     Smoking Tobacco Use: Former     Smokeless Tobacco Use: Never     Passive Exposure: Past          Disposition Plan     Medically Ready for Discharge: Anticipated in 2-4 Days             Chencho Alston MD  Hospitalist Service  [facility]  Securely message with [employer] (more info)  Text page via [facility] Paging/Directory   ______________________________________________________________________    Interval History   Chart reviewed, pt interviewed.       Rigors at the time that I saw him on the medical floor today. Family present at the bedside also concerned about abd distention. He has pain in the abd and mostly left side. Urinary frequency.  Passing small stools and small amounts of gas.     Physical Exam   Vital Signs: Temp: 97.9  F (36.6  C) Temp src: Oral BP: 109/78 Pulse: 84   Resp: 30 SpO2: 92 % O2 Device: None (Room air)    Weight: 192 lbs 14.44 oz    General Appearance: Alert and mostly coherent. Family feels he is confused.   Respiratory:  Good air entry throughout, scant end expiratory wheeze. No rales  Cardiovascular: RRR without murmur  GI: mildly distended, non-tender, no mass appreciable.  Skin: no lesions.  Other:      Medical Decision Making       50 MINUTES SPENT BY ME on the date of service doing chart review, history, exam, documentation & further activities per the note.      Data   Results for orders placed or performed during the hospital encounter of 02/20/25 (from the past 24 hours)   Basic metabolic panel   Result Value Ref Range    Sodium 136 135 - 145 mmol/L    Potassium 3.9 3.4 - 5.3 mmol/L    Chloride 104 98 - 107 mmol/L    Carbon Dioxide (CO2) 20 (L) 22 - 29 mmol/L    Anion Gap 12 7 - 15 mmol/L    Urea Nitrogen 18.7 8.0 - 23.0 mg/dL    Creatinine 1.52 (H) 0.67 - 1.17 mg/dL    GFR Estimate 51 (L) >60 mL/min/1.73m2    Calcium 7.6 (L) 8.8 - 10.4 mg/dL    Glucose 125 (H) 70 - 99 mg/dL   CBC with platelets   Result Value Ref Range    WBC Count 15.9 (H) 4.0 - 11.0 10e3/uL    RBC Count 3.68 (L) 4.40 - 5.90 10e6/uL    Hemoglobin 11.9 (L) 13.3 - 17.7 g/dL    Hematocrit 34.5 (L) 40.0 - 53.0 %    MCV 94 78 - 100 fL    MCH 32.3 26.5 - 33.0 pg    MCHC 34.5 31.5 - 36.5 g/dL    RDW 12.0 10.0 - 15.0 %     Platelet Count 142 (L) 150 - 450 10e3/uL   Blood gas venous   Result Value Ref Range    pH Venous 7.42 7.32 - 7.43    pCO2 Venous 35 (L) 40 - 50 mm Hg    pO2 Venous 24 (L) 25 - 47 mm Hg    Bicarbonate Venous 23 21 - 28 mmol/L    Base Excess/Deficit Venous -1.2 -3.0 - 3.0 mmol/L    FIO2 0     Oxyhemoglobin Venous 40 (L) 70 - 75 %    O2 Sat, Venous 41.0 (L) 70.0 - 75.0 %    Narrative    In healthy individuals, oxyhemoglobin (O2Hb) and oxygen saturation (SO2) are approximately equal. In the presence of dyshemoglobins, oxyhemoglobin can be considerably lower than oxygen saturation.   Lactic acid whole blood   Result Value Ref Range    Lactic Acid 1.7 0.7 - 2.0 mmol/L   Basic metabolic panel   Result Value Ref Range    Sodium 132 (L) 135 - 145 mmol/L    Potassium 3.3 (L) 3.4 - 5.3 mmol/L    Chloride 102 98 - 107 mmol/L    Carbon Dioxide (CO2) 18 (L) 22 - 29 mmol/L    Anion Gap 12 7 - 15 mmol/L    Urea Nitrogen 17.0 8.0 - 23.0 mg/dL    Creatinine 1.41 (H) 0.67 - 1.17 mg/dL    GFR Estimate 56 (L) >60 mL/min/1.73m2    Calcium 7.2 (L) 8.8 - 10.4 mg/dL    Glucose 114 (H) 70 - 99 mg/dL

## 2025-02-21 NOTE — PHARMACY-VANCOMYCIN DOSING SERVICE
Pharmacy Vancomycin Initial Note  Date of Service 2025  Patient's  1960  64 year old, male    Indication: Sepsis and Urinary Tract Infection    Current estimated CrCl = Estimated Creatinine Clearance: 53.8 mL/min (A) (based on SCr of 1.52 mg/dL (H)).    Creatinine for last 3 days  2025:  6:18 AM Creatinine 1.22 mg/dL;  2:34 PM Creatinine 1.36 mg/dL  2025:  6:46 AM Creatinine 1.52 mg/dL    Recent Vancomycin Level(s) for last 3 days  No results found for requested labs within last 3 days.      Vancomycin IV Administrations (past 72 hours)        No vancomycin orders with administrations in past 72 hours.                    Nephrotoxins and other renal medications (From now, onward)      Start     Dose/Rate Route Frequency Ordered Stop    25 153  vancomycin (VANCOCIN) 1,750 mg in 0.9% NaCl 517.5 mL intermittent infusion         1,750 mg  over 2 Hours Intravenous ONCE 25 15225  valACYclovir (VALTREX) tablet 500 mg        Note to Pharmacy: PTA Sig:Take 1 tablet (500 mg) by mouth daily.      500 mg Oral DAILY 25 194              Contrast Orders - past 72 hours (72h ago, onward)      Start     Dose/Rate Route Frequency Stop    25 154  iopamidol (ISOVUE-370) solution 500 mL         500 mL Intravenous ONCE 25 1546            [employer] Prediction of Planned Initial Vancomycin Regimen  Loading dose: 1750 mg at 16:00 2025.  Regimen: 1500 mg IV every 24 hours.  Start time: 04:00 on 2025  Exposure target: AUC24 (range)400-600 mg/L.hr   AUC24,ss: 545 mg/L.hr  Probability of AUC24 > 400: 82 %  Ctrough,ss: 16.5 mg/L  Probability of Ctrough,ss > 20: 33 %  Probability of nephrotoxicity (Lodise BALDEMAR ): 12 %          Plan:  Start vancomycin  1500 mg IV q24h.   Vancomycin monitoring method: AUC  Vancomycin therapeutic monitoring goal: 400-600 mg*h/L  Pharmacy will check vancomycin levels as appropriate in 1-3 Days.    Serum creatinine levels  will be ordered daily for the first week of therapy and at least twice weekly for subsequent weeks.      Inocencia Manuel RPH

## 2025-02-21 NOTE — CONSULTS
[facility] Consultation by [facility]    Benedict De Anda MRN# [id number]   Age: 64 year old YOB: 1960     Date of Admission:  2/20/2025    Reason for consult: UTI, recent L ureteral stent and lithotripsy        Requesting PA/MD: Dr. Fam       Level of consult: Consult, follow and place orders           Assessment and Plan:   Assessment:   POD 5 Cystourethroscopy Left ureteroscopy Left retrograde pyelogram with interpretation of intraoperative fluoroscopic imaging Holmium laser lithotripsy with basket stone extraction Left ureteral stent placement   Left renal mass >5 cm  Left sided pyelonephritis  ANNALEE  Leukocytosis      Plan:   -Continue with left-sided indwelling ureteral stent.  -Possible side effects with an indwelling ureteral stent such as urgency and frequency of urination, dysuria, hematuria, symptoms of urine reflux, and some achiness in the side. Indwelling ureteral stents need to be exchanged every three months or removed by three months.    -Continue with IV antibiotics.  Follow cultures.  Patient is on IV Rocephin.  Listed allergy to amoxicillin with hives.  Urine culture is currently showing Enterococcus, which may need changing of antibiotics.  -Would recommend patient be treated with a total of 10 to 14 days of culture specific antibiotics for complicated infection.  -We discussed possibly pushing back his ureteral stent removal.  I did discuss this with Dr. Ramos, and he thinks it is reasonable to leave it as scheduled on Monday.  -Pain and nausea management per primary service.  -Continue to monitor kidney function and leukocytosis.  -If clinical worsening, would consider broadening antibiotics and/or repeat imaging.  -Will eventually need partial nephrectomy for his left renal mass, but we do need to have the pyelonephritis and inflammation resolved prior to this.  -Flomax 0.4 mg once daily as blood pressure tolerates for stent discomfort.  -If patient starts having  a lot of urgency and frequency can consider starting oxybutynin 5 mg 3 times daily as needed.  -Bowel regimen.  -Would recommend bladder scan once daily to ensure adequate emptying, as patient does have some distention on examination.  -Will continue to follow along.    Neha Mullins PA-C   [facility]  [phone]               Chief Complaint:   Altered Mental Status, fever, and flank pain     History is obtained from the patient and EMR.         History of Present Illness:   This patient is a 64 year old male who presented to the ER yesterday due to concern for fever and hematuria.  He had also endorsed some flank pain.  Urinalysis was concerning for possible urinary tract infection.  Was thought that he possibly had pyelonephritis after recent left-sided ureteroscopy for left ureteral stone on 02/16/2025 with Dr. Nj.  He initially was sent home, but returned shortly due to altered mental status.  He was also found to have chills and rigors.  He endorses headache and symptoms worsened after taking ciprofloxacin.    CT imaging was obtained which shows concern for left-sided pyelonephritis.  Left ureteral stent appears to be in good position.  The left renal mass that was incidentally found over the weekend when he was imaged for his kidney stone is stable.  He has undergone staging imaging with bone scan and CT of the chest, which did not show evidence of metastasis in those areas.  He is meeting with Dr. Ramos in the future to discuss partial nephrectomy.  He also is scheduled to have his ureteral stent removed with him on 02/24/2025 in clinic.    Patient has been started on IV Rocephin.  He endorses symptoms of dysuria, hematuria with occasional clot, and reflux.  He has felt more distended as of late.  Bowels are somewhat atypical for him right now.  He was given MiraLAX this morning.  Patient endorses nausea, but denies vomiting.  He does note that the fever and chills have been occurring less  frequently.  Tmax of 100.9.  He continues to have left-sided flank pain.  No tachycardia noted.  Occasionally it softer blood pressure.  Evidence of tachypnea.    Blood cultures are in process.  Urine culture now shows greater than 100,000 CFU per mL of Enterococcus faecalis.  Creatinine 1.5 to EGFR 51 (1.36 EGFR 58).  Baseline creatinine is around 1.10.  Wife is at the bedside.         Past Medical History:     Past Medical History:   Diagnosis Date    Allergic rhinitis, cause unspecified 8/22/2005    testing 2006    Herpes simplex without mention of complication 8/22/2005        Patient Active Problem List   Diagnosis    Herpes simplex virus (HSV) infection    Allergic rhinitis    ED (erectile dysfunction)    ACP (advance care planning)    Kidney stone on left side    Left kidney mass    Ureteral stone    Left renal mass    Acute kidney injury    Lesion of pelvic bone    Infection associated with indwelling ureteral stent, initial encounter           Past Surgical History:     Past Surgical History:   Procedure Laterality Date    COMBINED CYSTOSCOPY, RETROGRADES, URETEROSCOPY, LASER HOLMIUM LITHOTRIPSY URETER(S), INSERT STENT Left 2/16/2025    Procedure: Cystourethroscopy Left ureteroscopy Left retrograde pyelogram with interpretation of intraoperative fluoroscopic imaging Holmium laser lithotripsy with basket stone extraction Left ureteral stent placement;  Surgeon: Xena Nj MD;  Location:  OR     KNEE SCOPE, W/LATERAL RELEASE  1992    knee spur             Social History:     Social History     Tobacco Use    Smoking status: Former     Passive exposure: Past    Smokeless tobacco: Never    Tobacco comments:     stopped age 26   Substance Use Topics    Alcohol use: Yes     Alcohol/week: 4.0 standard drinks of alcohol     Types: 4 Standard drinks or equivalent per week             Family History:     Family History   Problem Relation Age of Onset    C.A.D. Father         bypass    Hypertension Father      Lipids Father     Diabetes No family hx of      Family history reviewed.          Allergies:     Allergies   Allergen Reactions    Amoxicillin Hives          Medications:     Current Facility-Administered Medications   Medication Dose Route Frequency Provider Last Rate Last Admin    acetaminophen (TYLENOL) tablet 650 mg  650 mg Oral Q4H PRN Humberto Fam DO   650 mg at 02/21/25 0613    Or    acetaminophen (TYLENOL) Suppository 650 mg  650 mg Rectal Q4H PRN Humberto Fam DO        calcium carbonate (TUMS) chewable tablet 1,000 mg  1,000 mg Oral 4x Daily PRN Humberto Fam DO        cefTRIAXone (ROCEPHIN) 2 g vial to attach to  ml bag for ADULTS or NS 50 ml bag for PEDS  2 g Intravenous Q24H Humberto Fam DO        HYDROmorphone (DILAUDID) half-tab 1 mg  1 mg Oral Q4H PRN Humberto Fam DO   1 mg at 02/20/25 2054    HYDROmorphone (DILAUDID) injection 0.2 mg  0.2 mg Intravenous Q2H PRN Humberto Fam DO        HYDROmorphone (DILAUDID) injection 0.4 mg  0.4 mg Intravenous Q2H PRN Humberto Fam DO        HYDROmorphone (DILAUDID) tablet 2 mg  2 mg Oral Q4H PRN Humberto Fam DO   2 mg at 02/21/25 0613    lidocaine (LMX4) cream   Topical Q1H PRN Humberto Fam DO        lidocaine 1 % 0.1-1 mL  0.1-1 mL Other Q1H PRN Humberto Fam DO        naloxone (NARCAN) injection 0.2 mg  0.2 mg Intravenous Q2 Min PRN Humberto Fam DO        Or    naloxone (NARCAN) injection 0.4 mg  0.4 mg Intravenous Q2 Min PRN Humberto Fam DO        Or    naloxone (NARCAN) injection 0.2 mg  0.2 mg Intramuscular Q2 Min PRHumberto Houser DO        Or    naloxone (NARCAN) injection 0.4 mg  0.4 mg Intramuscular Q2 Min PRN Humberto Fam DO        ondansetron (ZOFRAN ODT) ODT tab 4 mg  4 mg Oral Q6H PRN Sarwat, Humberto Junaid, DO        Or    ondansetron (ZOFRAN) injection 4 mg  4 mg Intravenous Q6H PRN Humberto Fam DO   4 mg at  "02/20/25 2058    oxyBUTYnin (DITROPAN) tablet 5 mg  5 mg Oral TID PRN Humberto Fam DO   5 mg at 02/21/25 0613    polyethylene glycol (MIRALAX) Packet 17 g  17 g Oral BID PRN Humberto Fam DO   17 g at 02/21/25 0855    senna-docusate (SENOKOT-S/PERICOLACE) 8.6-50 MG per tablet 1 tablet  1 tablet Oral BID PRN Humberto Fam DO        Or    senna-docusate (SENOKOT-S/PERICOLACE) 8.6-50 MG per tablet 2 tablet  2 tablet Oral BID PRN Humberto Fam DO        sodium chloride (PF) 0.9% PF flush 3 mL  3 mL Intracatheter Q8H Humberto Fam DO   3 mL at 02/20/25 2008    sodium chloride (PF) 0.9% PF flush 3 mL  3 mL Intracatheter q1 min prn Humberto Fam DO        sodium chloride 0.9 % infusion   Intravenous Continuous Humberto Fam  mL/hr at 02/21/25 0600 New Bag at 02/21/25 0600    tamsulosin (FLOMAX) capsule 0.4 mg  0.4 mg Oral Daily Humberto Fam DO   0.4 mg at 02/21/25 0855    valACYclovir (VALTREX) tablet 500 mg  500 mg Oral Daily Humberto Fam DO   500 mg at 02/21/25 0855             Review of Systems:   A comprehensive 10-point review of systems was performed and found to be negative except as described in the HPI.     /68 (BP Location: Left arm)   Pulse 83   Temp 97.9  F (36.6  C) (Oral)   Resp 20   Ht 1.727 m (5' 8\")   Wt 88.7 kg (195 lb 8 oz)   SpO2 94%   BMI 29.73 kg/m    PSYCH: NAD  EYES: EOMI  MOUTH: MMM  NECK: Supple, no notable adenopathy  RESP: Unlabored breathing, occasional cough  CARDIAC: No LE edema, regular radial pulse  SKIN: Warm, no rashes  ABD: Nontender, but slightly distended  NEURO: AAO x3  URO: Urinating on own with hematuria, some dysuria, and refulx         Data:     Lab Results   Component Value Date    WBC 15.9 (H) 02/21/2025    HGB 11.9 (L) 02/21/2025    HCT 34.5 (L) 02/21/2025    MCV 94 02/21/2025     (L) 02/21/2025     Lab Results   Component Value Date    CR 1.52 (H) 02/21/2025    CR 1.36 (H) " 02/20/2025     Recent Labs   Lab 02/20/25  0613   COLOR Yellow   APPEARANCE Slightly Cloudy*   URINEGLC Negative   URINEBILI Negative   URINEKETONE Negative   SG 1.017   URINEPH 8.0*   PROTEIN 50*   NITRITE Negative   LEUKEST Large*   RBCU >182*   WBCU 105*     All cultures:  Recent Labs   Lab 02/20/25  1514 02/20/25  0613   CULTURE No growth after 12 hours  No growth after 12 hours >100,000 CFU/mL Enterococcus faecalis*      CT Abdomen Pelvis w Contrast    Result Date: 2/20/2025  EXAM: CT ABDOMEN PELVIS W CONTRAST LOCATION: [facility] DATE: 2/20/2025 INDICATION: Left ureteral stent, rigors. COMPARISON: 2/16/2025. TECHNIQUE: CT scan of the abdomen and pelvis was performed following injection of intravenous contrast. Multiplanar reformats were obtained. Dose reduction techniques were used. CONTRAST: 100 mL Isovue 370. FINDINGS: LOWER CHEST: Atelectasis is present in both bases. HEPATOBILIARY: Normal. PANCREAS: Normal. SPLEEN: Borderline splenomegaly at 13.1 cm. No focal lesions. ADRENAL GLANDS: Normal. KIDNEYS/BLADDER: There has been interval placement of a left double-J ureteral stent. Left-sided hydronephrosis has decreased slightly. No evidence of stone material along the stent. Perinephric and periureteral stranding is slightly increased from the previous study. Punctate nonobstructing stone in the lower pole left kidney on image 94 of series 3. Otherwise, no evidence for renal or ureteral calculi through either kidney. Heterogeneous mass arising from the posterior mid to lower left kidney is again noted and concerning for renal cell carcinoma. Tiny amount of nondependent gas is seen within the bladder. No evidence for bladder mass. BOWEL: No colonic wall thickening or inflammatory change. LYMPH NODES: Normal. VASCULATURE: Normal. PELVIC ORGANS: No free fluid or adenopathy through the pelvis. MUSCULOSKELETAL: Mild degenerative changes are noted through the spine.     IMPRESSION: 1.   Interval placement of left double-J ureteral stent. Left-sided hydronephrosis has decreased slightly. No evidence of stone material along the stent. Perinephric and periureteral stranding and edema has increased slightly, and could be related to infection. 2.  Punctate nonobstructing stone in the lower pole left kidney. 3.  Stable heterogeneous mass arising from the posterior mid to lower left kidney concerning for renal cell carcinoma.     XR KUB    Result Date: 2/20/2025  EXAM: XR KUB LOCATION: [facility] DATE: 2/20/2025 INDICATION: Left flank pain, history of stent placement. COMPARISON: None.     IMPRESSION: Left ureteral stent in place. Faint density overlying the sacrum near the stent noted, distal ureteral stone could have this appearance.    NM Bone Scan Whole Body    Result Date: 2/17/2025  EXAM: NM BONE SCAN WHOLE BODY LOCATION: [facility] DATE: 2/17/2025 INDICATION: Malignant neoplasm of left kidney, except renal pelvis COMPARISON: CT the abdomen pelvis dated 2/16/2025 and CT the chest dated 2/17/2025. TECHNIQUE: 27.5 mCi technetium-99m MDP, IV. Anterior and posterior delayed whole-body images at 3 hours with additional spot images of the skull. FINDINGS: Radiotracer uptake in a pattern typical of degenerative change involving the shoulders, spine, hips, knees, mid feet, and great toes, without suspicious areas of altered uptake to suggest osseous metastasis.     IMPRESSION: No osseous metastasis.    CT Chest w Contrast    Result Date: 2/17/2025  EXAM: CT CHEST W CONTRAST LOCATION: [facility] DATE: 2/17/2025 INDICATION: renal mass  >5 cm COMPARISON: Abdomen/pelvis CT from 02/16/2025 is reviewed. TECHNIQUE: CT chest with IV contrast. Multiplanar reformats were obtained. Dose reduction techniques were used. CONTRAST: 96mL Isovue 370 FINDINGS: LUNGS AND PLEURA: Benign variant right upper lobe azygos fissure. Strands of atelectasis both  lungs. MEDIASTINUM/AXILLAE: Normal. CORONARY ARTERY CALCIFICATION: None. UPPER ABDOMEN: Left ureteral stent. MUSCULOSKELETAL: Calcific tendinitis right rotator cuff. Old right sixth rib fracture. Minimal degenerative change T3-T4 interspace.     IMPRESSION: No evidence of thoracic metastases     XR Surgery GIANA L/T 5 Min Fluoro w Stills    Result Date: 2/16/2025  This exam was marked as non-reportable because it will not be read by a radiologist or a [facility] non-radiologist provider.     CT Abdomen Pelvis w Contrast    Result Date: 2/16/2025  EXAM: CT ABDOMEN PELVIS W CONTRAST LOCATION: [facility] DATE: 2/16/2025 INDICATION: Left flank pain. COMPARISON: None. TECHNIQUE: CT scan of the abdomen and pelvis was performed following injection of IV contrast. Multiplanar reformats were obtained. Dose reduction techniques were used. CONTRAST: 100mL Isovue 370 FINDINGS: LOWER CHEST: Bibasilar subsegmental atelectasis versus scar. HEPATOBILIARY: Normal. PANCREAS: Normal. SPLEEN: Normal. ADRENAL GLANDS: Normal. KIDNEYS/BLADDER: Left hydronephrosis secondary to a 6 x 4 x 4 mm calculus proximal left ureter at the L3-L4 level. Punctate calculus within left lower pole calyx. No right-sided urinary tract calculus. 5 x 4.5 x 4.5 cm heterogeneously enhancing cortical mass arising from posterior mid left renal cortex. Left renal veins are patent. Tiny hypoattenuating cortical lesions right kidney are too small to characterize with imaging. The right ureter and bladder are negative. BOWEL: Diverticulosis of the colon. No acute inflammatory change. No obstruction. LYMPH NODES: No lymphadenopathy. VASCULATURE: No aortoiliac aneurysm. PELVIC ORGANS: Normal. MUSCULOSKELETAL: At least 3 mixed lytic/sclerotic bony exostoses arising from the left pubic bone and bilateral superior pubic rami. Findings could represent multiple osteochondromas, but are technically indeterminate in setting of large left renal mass.      IMPRESSION: 1.  Left hydronephrosis secondary to a 6 x 4 x 4 mm calculus proximal left ureter at the L3-L4 level. Punctate nonobstructing calculus left lower pole calyx. No right-sided urinary tract calculi. 2.  5 x 4.5 x 4.5 cm heterogeneously enhancing mass arising from the posterior cortex of the mid left kidney, compatible with a renal cell carcinoma. No evidence of renal vein extension nor retroperitoneal lymphadenopathy. 3.  At least 3 mixed lytic/sclerotic bony exostoses arising from the left pubic bone and bilateral superior pubic rami. Differential considerations would include multiple osteochondromas. Osseous metastases are entirely excluded. MRI of the bony pelvis would be recommended, if no previous comparison studies available.

## 2025-02-21 NOTE — PLAN OF CARE
"Goal Outcome Evaluation:      Plan of Care Reviewed With: patient, spouse    Overall Patient Progress: no changeOverall Patient Progress: no change    [id number]    VSS except tachypnea. Pt endorsing hot and cold flashes with intermittent mild rigors. Afebrile. 2/10 left flank pressure that increases with urination. Tylenol x1, 1 mg PO dilaudid x1. Urine rafi/tea per pt, denies dysuria. Reporting headache, dizziness, and \"hazy\" vision. Ax1, bed alarm on. IVF infusing. A/Ox4, was previously confused at home and ED, continue to monitor. Intermittent nausea, zofran x1 with relief. Continue to monitor and provide cares.    Problem: Adult Inpatient Plan of Care  Goal: Plan of Care Review  Description: The Plan of Care Review/Shift note should be completed every shift.  The Outcome Evaluation is a brief statement about your assessment that the patient is improving, declining, or no change.  This information will be displayed automatically on your shift  note.  Outcome: Not Progressing  Flowsheets (Taken 2/20/2025 2221)  Plan of Care Reviewed With:   patient   spouse  Overall Patient Progress: no change  Goal: Patient-Specific Goal (Individualized)  Description: You can add care plan individualizations to a care plan. Examples of Individualization might be:  \"Parent requests to be called daily at 9am for status\", \"I have a hard time hearing out of my right ear\", or \"Do not touch me to wake me up as it startles  me\".  Outcome: Not Progressing  Goal: Absence of Hospital-Acquired Illness or Injury  Outcome: Not Progressing  Intervention: Identify and Manage Fall Risk  Recent Flowsheet Documentation  Taken 2/20/2025 2000 by Carol Donovan, RN  Safety Promotion/Fall Prevention:   activity supervised   assistive device/personal items within reach   clutter free environment maintained   increased rounding and observation   increase visualization of patient   lighting adjusted   nonskid shoes/slippers when out of bed   patient and " family education   room near nurse's station   safety round/check completed   supervised activity  Intervention: Prevent Infection  Recent Flowsheet Documentation  Taken 2/20/2025 2000 by Carol Donovan, RN  Infection Prevention:   cohorting utilized   environmental surveillance performed   equipment surfaces disinfected   hand hygiene promoted   rest/sleep promoted   single patient room provided  Goal: Optimal Comfort and Wellbeing  Outcome: Not Progressing  Goal: Readiness for Transition of Care  Outcome: Not Progressing  Intervention: Mutually Develop Transition Plan  Recent Flowsheet Documentation  Taken 2/20/2025 2022 by Carol Donovan, RN  Equipment Currently Used at Home: none     Problem: UTI (Urinary Tract Infection)  Goal: Improved Infection Symptoms  Outcome: Not Progressing  Intervention: Prevent Infection Progression  Recent Flowsheet Documentation  Taken 2/20/2025 2000 by Carol Donovan, RN  Bladder Spasm Management: (meds) other (see comments)  Intervention: Facilitate Optimal Urinary Elimination  Recent Flowsheet Documentation  Taken 2/20/2025 2000 by Carol Donovan, RN  Urinary Elimination Promotion:   frequent voiding encouraged   toileting offered   voiding relaxation promoted

## 2025-02-21 NOTE — PLAN OF CARE
"Goal Outcome Evaluation:      Plan of Care Reviewed With: patient    Overall Patient Progress: no changeOverall Patient Progress: no change    VSS. Tmax 100.9 with mild rigors and hot/cold flashes. Tylenol x3. 3-5/10 headache and left flank pressure. 1 mg po dilaudid x1, 2 mg po dilaudid x2, heat in use. Zofran x1 for intermittent nausea. Tolerating clears, did not want to attempt anything more. Endorses increase flank pressure with urination and urgency. Reporting dizziness and \"hazy\" vision. Ax1 with bed alarm. A/Ox4. IVF infusing. Continue to monitor and provide cares.    Problem: Adult Inpatient Plan of Care  Goal: Plan of Care Review  Description: The Plan of Care Review/Shift note should be completed every shift.  The Outcome Evaluation is a brief statement about your assessment that the patient is improving, declining, or no change.  This information will be displayed automatically on your shift  note.  2/21/2025 0453 by Carol Donovan, RN  Outcome: Not Progressing  Flowsheets (Taken 2/21/2025 0453)  Plan of Care Reviewed With: patient  Overall Patient Progress: no change  2/20/2025 2221 by Carol Donovan, RN  Outcome: Not Progressing  Flowsheets (Taken 2/20/2025 2221)  Plan of Care Reviewed With:   patient   spouse  Overall Patient Progress: no change  Goal: Patient-Specific Goal (Individualized)  Description: You can add care plan individualizations to a care plan. Examples of Individualization might be:  \"Parent requests to be called daily at 9am for status\", \"I have a hard time hearing out of my right ear\", or \"Do not touch me to wake me up as it startles  me\".  2/21/2025 0453 by Carol Donovan, RN  Outcome: Not Progressing  2/20/2025 2221 by Carol Donovan, RN  Outcome: Not Progressing  Goal: Absence of Hospital-Acquired Illness or Injury  2/21/2025 0453 by Carol Donovan, RN  Outcome: Not Progressing  2/20/2025 2221 by Carol Donovan, RN  Outcome: Not Progressing  Intervention: Identify and Manage Fall " Risk  Recent Flowsheet Documentation  Taken 2/20/2025 2000 by Carol Donovan RN  Safety Promotion/Fall Prevention:   activity supervised   assistive device/personal items within reach   clutter free environment maintained   increased rounding and observation   increase visualization of patient   lighting adjusted   nonskid shoes/slippers when out of bed   patient and family education   room near nurse's station   safety round/check completed   supervised activity  Intervention: Prevent Infection  Recent Flowsheet Documentation  Taken 2/20/2025 2000 by Carol Donovan RN  Infection Prevention:   cohorting utilized   environmental surveillance performed   equipment surfaces disinfected   hand hygiene promoted   rest/sleep promoted   single patient room provided  Goal: Optimal Comfort and Wellbeing  2/21/2025 0453 by Carol Donovan RN  Outcome: Not Progressing  2/20/2025 2221 by Carol Donovan RN  Outcome: Not Progressing  Goal: Readiness for Transition of Care  2/21/2025 0453 by Carol Donovan RN  Outcome: Not Progressing  2/20/2025 2221 by Carol Donovan RN  Outcome: Not Progressing  Intervention: Mutually Develop Transition Plan  Recent Flowsheet Documentation  Taken 2/20/2025 2022 by Carol Donovan RN  Equipment Currently Used at Home: none     Problem: UTI (Urinary Tract Infection)  Goal: Improved Infection Symptoms  2/21/2025 0453 by Carol Donovan RN  Outcome: Not Progressing  2/20/2025 2221 by Carol Donovan RN  Outcome: Not Progressing  Intervention: Prevent Infection Progression  Recent Flowsheet Documentation  Taken 2/20/2025 2000 by Carol Donovan RN  Bladder Spasm Management: (meds) other (see comments)  Intervention: Facilitate Optimal Urinary Elimination  Recent Flowsheet Documentation  Taken 2/20/2025 2000 by Carol Donovan RN  Urinary Elimination Promotion:   frequent voiding encouraged   toileting offered   voiding relaxation promoted

## 2025-02-22 ENCOUNTER — APPOINTMENT (OUTPATIENT)
Dept: GENERAL RADIOLOGY | Facility: CLINIC | Age: 65
End: 2025-02-22
Attending: INTERNAL MEDICINE
Payer: COMMERCIAL

## 2025-02-22 LAB
ADV 40+41 DNA STL QL NAA+NON-PROBE: NEGATIVE
ANION GAP SERPL CALCULATED.3IONS-SCNC: 12 MMOL/L (ref 7–15)
ASTRO TYP 1-8 RNA STL QL NAA+NON-PROBE: NEGATIVE
BACTERIA UR CULT: ABNORMAL
BUN SERPL-MCNC: 12.3 MG/DL (ref 8–23)
C CAYETANENSIS DNA STL QL NAA+NON-PROBE: NEGATIVE
C DIFF TOX B STL QL: NEGATIVE
CALCIUM SERPL-MCNC: 7.6 MG/DL (ref 8.8–10.4)
CAMPYLOBACTER DNA SPEC NAA+PROBE: NEGATIVE
CHLORIDE SERPL-SCNC: 104 MMOL/L (ref 98–107)
CREAT SERPL-MCNC: 1.38 MG/DL (ref 0.67–1.17)
CRYPTOSP DNA STL QL NAA+NON-PROBE: NEGATIVE
E COLI O157 DNA STL QL NAA+NON-PROBE: NORMAL
E HISTOLYT DNA STL QL NAA+NON-PROBE: NEGATIVE
EAEC ASTA GENE ISLT QL NAA+PROBE: NEGATIVE
EC STX1+STX2 GENES STL QL NAA+NON-PROBE: NEGATIVE
EGFRCR SERPLBLD CKD-EPI 2021: 57 ML/MIN/1.73M2
EPEC EAE GENE STL QL NAA+NON-PROBE: NEGATIVE
ERYTHROCYTE [DISTWIDTH] IN BLOOD BY AUTOMATED COUNT: 12 % (ref 10–15)
ETEC LTA+ST1A+ST1B TOX ST NAA+NON-PROBE: NEGATIVE
FLUAV RNA SPEC QL NAA+PROBE: NEGATIVE
FLUBV RNA RESP QL NAA+PROBE: NEGATIVE
G LAMBLIA DNA STL QL NAA+NON-PROBE: NEGATIVE
GLUCOSE SERPL-MCNC: 120 MG/DL (ref 70–99)
HCO3 SERPL-SCNC: 20 MMOL/L (ref 22–29)
HCT VFR BLD AUTO: 34.9 % (ref 40–53)
HGB BLD-MCNC: 11.9 G/DL (ref 13.3–17.7)
MAGNESIUM SERPL-MCNC: 1.5 MG/DL (ref 1.7–2.3)
MAGNESIUM SERPL-MCNC: 2.3 MG/DL (ref 1.7–2.3)
MCH RBC QN AUTO: 32.5 PG (ref 26.5–33)
MCHC RBC AUTO-ENTMCNC: 34.1 G/DL (ref 31.5–36.5)
MCV RBC AUTO: 95 FL (ref 78–100)
NOROVIRUS GI+II RNA STL QL NAA+NON-PROBE: NEGATIVE
P SHIGELLOIDES DNA STL QL NAA+NON-PROBE: NEGATIVE
PHOSPHATE SERPL-MCNC: 1.3 MG/DL (ref 2.5–4.5)
PHOSPHATE SERPL-MCNC: 2 MG/DL (ref 2.5–4.5)
PLATELET # BLD AUTO: 129 10E3/UL (ref 150–450)
POTASSIUM SERPL-SCNC: 3.7 MMOL/L (ref 3.4–5.3)
POTASSIUM SERPL-SCNC: 3.7 MMOL/L (ref 3.4–5.3)
RBC # BLD AUTO: 3.66 10E6/UL (ref 4.4–5.9)
RSV RNA SPEC NAA+PROBE: NEGATIVE
RVA RNA STL QL NAA+NON-PROBE: NEGATIVE
SALMONELLA SP RPOD STL QL NAA+PROBE: NEGATIVE
SAPO I+II+IV+V RNA STL QL NAA+NON-PROBE: NEGATIVE
SARS-COV-2 RNA RESP QL NAA+PROBE: NEGATIVE
SHIGELLA SP+EIEC IPAH ST NAA+NON-PROBE: NEGATIVE
SODIUM SERPL-SCNC: 136 MMOL/L (ref 135–145)
V CHOLERAE DNA SPEC QL NAA+PROBE: NEGATIVE
VIBRIO DNA SPEC NAA+PROBE: NEGATIVE
WBC # BLD AUTO: 9.4 10E3/UL (ref 4–11)
Y ENTEROCOL DNA STL QL NAA+PROBE: NEGATIVE

## 2025-02-22 PROCEDURE — 250N000011 HC RX IP 250 OP 636: Performed by: INTERNAL MEDICINE

## 2025-02-22 PROCEDURE — 83735 ASSAY OF MAGNESIUM: CPT | Performed by: INTERNAL MEDICINE

## 2025-02-22 PROCEDURE — 250N000009 HC RX 250: Performed by: INTERNAL MEDICINE

## 2025-02-22 PROCEDURE — 36415 COLL VENOUS BLD VENIPUNCTURE: CPT | Performed by: INTERNAL MEDICINE

## 2025-02-22 PROCEDURE — 87493 C DIFF AMPLIFIED PROBE: CPT | Performed by: INTERNAL MEDICINE

## 2025-02-22 PROCEDURE — 71045 X-RAY EXAM CHEST 1 VIEW: CPT

## 2025-02-22 PROCEDURE — 87637 SARSCOV2&INF A&B&RSV AMP PRB: CPT | Performed by: INTERNAL MEDICINE

## 2025-02-22 PROCEDURE — 250N000013 HC RX MED GY IP 250 OP 250 PS 637: Performed by: INTERNAL MEDICINE

## 2025-02-22 PROCEDURE — 84100 ASSAY OF PHOSPHORUS: CPT | Performed by: INTERNAL MEDICINE

## 2025-02-22 PROCEDURE — 99232 SBSQ HOSP IP/OBS MODERATE 35: CPT | Performed by: INTERNAL MEDICINE

## 2025-02-22 PROCEDURE — 258N000003 HC RX IP 258 OP 636: Performed by: INTERNAL MEDICINE

## 2025-02-22 PROCEDURE — 82310 ASSAY OF CALCIUM: CPT | Performed by: INTERNAL MEDICINE

## 2025-02-22 PROCEDURE — 94640 AIRWAY INHALATION TREATMENT: CPT | Mod: 76

## 2025-02-22 PROCEDURE — 87507 IADNA-DNA/RNA PROBE TQ 12-25: CPT | Performed by: INTERNAL MEDICINE

## 2025-02-22 PROCEDURE — 80048 BASIC METABOLIC PNL TOTAL CA: CPT | Performed by: INTERNAL MEDICINE

## 2025-02-22 PROCEDURE — 85018 HEMOGLOBIN: CPT | Performed by: INTERNAL MEDICINE

## 2025-02-22 PROCEDURE — 120N000004 HC R&B MS OVERFLOW

## 2025-02-22 PROCEDURE — 94640 AIRWAY INHALATION TREATMENT: CPT

## 2025-02-22 PROCEDURE — 82374 ASSAY BLOOD CARBON DIOXIDE: CPT | Performed by: INTERNAL MEDICINE

## 2025-02-22 PROCEDURE — 999N000157 HC STATISTIC RCP TIME EA 10 MIN

## 2025-02-22 RX ORDER — FUROSEMIDE 10 MG/ML
20 INJECTION INTRAMUSCULAR; INTRAVENOUS ONCE
Status: COMPLETED | OUTPATIENT
Start: 2025-02-22 | End: 2025-02-22

## 2025-02-22 RX ORDER — MAGNESIUM SULFATE HEPTAHYDRATE 40 MG/ML
4 INJECTION, SOLUTION INTRAVENOUS ONCE
Status: COMPLETED | OUTPATIENT
Start: 2025-02-22 | End: 2025-02-22

## 2025-02-22 RX ORDER — HYDROMORPHONE HYDROCHLORIDE 2 MG/1
2 TABLET ORAL EVERY 4 HOURS PRN
Status: DISCONTINUED | OUTPATIENT
Start: 2025-02-22 | End: 2025-02-25 | Stop reason: HOSPADM

## 2025-02-22 RX ADMIN — CALCIUM CARBONATE (ANTACID) CHEW TAB 500 MG 1000 MG: 500 CHEW TAB at 12:20

## 2025-02-22 RX ADMIN — MAGNESIUM SULFATE HEPTAHYDRATE 4 G: 40 INJECTION, SOLUTION INTRAVENOUS at 01:48

## 2025-02-22 RX ADMIN — POTASSIUM & SODIUM PHOSPHATES POWDER PACK 280-160-250 MG 1 PACKET: 280-160-250 PACK at 20:15

## 2025-02-22 RX ADMIN — ACETAMINOPHEN 975 MG: 325 TABLET, FILM COATED ORAL at 00:36

## 2025-02-22 RX ADMIN — POTASSIUM & SODIUM PHOSPHATES POWDER PACK 280-160-250 MG 1 PACKET: 280-160-250 PACK at 15:07

## 2025-02-22 RX ADMIN — HYDROMORPHONE HYDROCHLORIDE 2 MG: 2 TABLET ORAL at 01:16

## 2025-02-22 RX ADMIN — CALCIUM CARBONATE (ANTACID) CHEW TAB 500 MG 1000 MG: 500 CHEW TAB at 20:15

## 2025-02-22 RX ADMIN — HYDROMORPHONE HYDROCHLORIDE 2 MG: 2 TABLET ORAL at 13:46

## 2025-02-22 RX ADMIN — VALACYCLOVIR HYDROCHLORIDE 500 MG: 500 TABLET, FILM COATED ORAL at 09:10

## 2025-02-22 RX ADMIN — POTASSIUM & SODIUM PHOSPHATES POWDER PACK 280-160-250 MG 1 PACKET: 280-160-250 PACK at 23:03

## 2025-02-22 RX ADMIN — HYDROMORPHONE HYDROCHLORIDE 2 MG: 2 TABLET ORAL at 18:15

## 2025-02-22 RX ADMIN — FUROSEMIDE 20 MG: 10 INJECTION, SOLUTION INTRAMUSCULAR; INTRAVENOUS at 14:27

## 2025-02-22 RX ADMIN — ACETAMINOPHEN 975 MG: 325 TABLET, FILM COATED ORAL at 18:15

## 2025-02-22 RX ADMIN — CEFTRIAXONE 2 G: 2 INJECTION, POWDER, FOR SOLUTION INTRAMUSCULAR; INTRAVENOUS at 14:40

## 2025-02-22 RX ADMIN — POTASSIUM CHLORIDE, DEXTROSE MONOHYDRATE AND SODIUM CHLORIDE: 150; 5; 450 INJECTION, SOLUTION INTRAVENOUS at 00:36

## 2025-02-22 RX ADMIN — TAMSULOSIN HYDROCHLORIDE 0.4 MG: 0.4 CAPSULE ORAL at 09:11

## 2025-02-22 RX ADMIN — HYDROMORPHONE HYDROCHLORIDE 2 MG: 2 TABLET ORAL at 23:03

## 2025-02-22 RX ADMIN — SODIUM PHOSPHATE, MONOBASIC, MONOHYDRATE AND SODIUM PHOSPHATE, DIBASIC, ANHYDROUS 9 MMOL: 142; 276 INJECTION, SOLUTION INTRAVENOUS at 02:04

## 2025-02-22 RX ADMIN — HYDROMORPHONE HYDROCHLORIDE 2 MG: 2 TABLET ORAL at 09:10

## 2025-02-22 RX ADMIN — ALBUTEROL SULFATE 2.5 MG: 2.5 SOLUTION RESPIRATORY (INHALATION) at 12:50

## 2025-02-22 RX ADMIN — VANCOMYCIN HYDROCHLORIDE 1500 MG: 5 INJECTION, POWDER, LYOPHILIZED, FOR SOLUTION INTRAVENOUS at 11:55

## 2025-02-22 RX ADMIN — SODIUM PHOSPHATE, MONOBASIC, MONOHYDRATE AND SODIUM PHOSPHATE, DIBASIC, ANHYDROUS 9 MMOL: 142; 276 INJECTION, SOLUTION INTRAVENOUS at 04:12

## 2025-02-22 RX ADMIN — ALBUTEROL SULFATE 2.5 MG: 2.5 SOLUTION RESPIRATORY (INHALATION) at 00:15

## 2025-02-22 RX ADMIN — ACETAMINOPHEN 975 MG: 325 TABLET, FILM COATED ORAL at 09:11

## 2025-02-22 NOTE — PROGRESS NOTES
"[facility]    Medicine Progress Note - Hospitalist Service    Date of Admission:  2/20/2025    Assessment & Plan   Benedict De Anda is a 64 year old male who came to attention on 2/20/2025 due to concern for new and rather abrupt onset of confusion and headache.      His recent medical history includes hospitalization from 2/16-2/17 left ureteral stent placement with laser lithotripsy with basket stone extraction for management of a 6 x 4 mm calculus in the proximal left ureter.  He was also incidentally noted to have a 5 cm heterogeneously enhancing mass in the left kidney compatible with renal cell carcinoma.  The patient then returned to the emergency department on 2/20/2025 early in the morning with concern for flank pain and dizziness at which point he reported fever and dysuria.  Urology was contacted but patient was discharged home with ciprofloxacin.  It was in this setting that the patient had return to the emergency department with confusion and headache.    PMH includes nephrolithiasis.    On presentation to the emergency department, VS: /64 came down to 104/53 over the course of about 1/2-hour.  HR 91, RR 16 with oxygen saturation 95% breathing room air.  Temperature 98.6.  Exam: Patient was described as \"fatigued but nontoxic\"-appearing.  Labs: Creatinine 1.36 with normal electrolytes.  Total bilirubin 1.7.  Glucose 120.  Lactic acid 1.9.  VBG pH 7.44 with pCO2 36.  WBC 16.1, Hgb 14.2, .  Urinalysis from earlier in the day was strongly positive and had been sent for urine culture.  Blood cultures were also obtained in the ED at the time of admission.  Imaging: CT abdomen and pelvis with contrast shows \"Interval placement of left double-J ureteral stent. Left-sided hydronephrosis has decreased slightly. No evidence of stone material along the stent. Perinephric and periureteral stranding and edema has increased slightly, and could be related to   infection...\"   " Adilson was started on Ceftriaxone 2 g IV q 24 hours and NS at 100 ml/hr.    DX:  Sepsis with mild acute kidney injury, mild infectious encephalopathy, elevated white cell count and fevers.  Improving/stable. He has grown an Enterococcus from the urine, but I am concerned that he may have had a GNR that we have been unable to culture due to his exposure to antibiotics.    Complicated urinary tract infection status post left ureteral instrumentation and lithotripsy.  Acute kidney injury.  Baseline creatinine 1.1.  Very mild acute encephalopathy. Although this is mentioned by family it would be compatible with the moderate sepsis syndrome (i.e. infectious encephalopathy), it will be managed by treating the UTI.  GIA/MDD.  Wheezing.  Family is concerned about possible pneumonia. Pt appears volume overloaded.   PLAN:  Given a liter of NS.  (This is about the 6th liter of crystalloid in 2 days.) Now on D5 0.45 NS with 20 mEq KCl at 125 ml/hr.   Start Vanco to cover for the Enterococcus. Pt describes significant PCN allergy.  Continues on Ceftriaxone.  I gave a dose of Azithro yesterday, but I do not believe that pneumonia is a major component of the current illness.   Consult ID.   Strict I & O.  CXR confirms CHF. Iatrogenic. Will give a dose of Lasix.           Diet: Combination Diet Regular Diet Adult    DVT Prophylaxis: Pneumatic Compression Devices  Lopez Catheter: Not present  Lines: None     Cardiac Monitoring: None  Code Status: Full Code      Clinically Significant Risk Factors        # Hypokalemia: Lowest K = 3.3 mmol/L in last 2 days, will replace as needed  # Hyponatremia: Lowest Na = 132 mmol/L in last 2 days, will monitor as appropriate   # Hypocalcemia: Lowest Ca = 7.2 mg/dL in last 2 days, will monitor and replace as appropriate   # Hypomagnesemia: Lowest Mg = 1.4 mg/dL in last 2 days, will replace as needed     # Thrombocytopenia: Lowest platelets = 129 in last 2 days, will monitor for bleeding             "  # Overweight: Estimated body mass index is 29.73 kg/m  as calculated from the following:    Height as of this encounter: 1.727 m (5' 8\").    Weight as of this encounter: 88.7 kg (195 lb 8 oz)., PRESENT ON ADMISSION            Social Drivers of Health    Tobacco Use: Medium Risk (12/10/2024)    Patient History     Smoking Tobacco Use: Former     Smokeless Tobacco Use: Never     Passive Exposure: Past          Disposition Plan     Medically Ready for Discharge: Anticipated in 2-4 Days      Chencho Alston MD  Hospitalist Service  [facility]  Securely message with [employer] (more info)  Text page via [facility] Paging/Directory   ______________________________________________________________________    Interval History   Stable night per nursing documentation.     Feeling better, but still uncomfortable. No N/V.  Feels abd bloating is stable with only small volume of flatus.     Physical Exam   Vital Signs: Temp: 99.6  F (37.6  C) Temp src: Oral BP: 133/60 Pulse: 82   Resp: 28 SpO2: 92 % O2 Device: None (Room air)    Weight: 195 lbs 8 oz    General Appearance: Alert and coherent. Family feels he is confused.   Respiratory:  Good air entry throughout, scant end expiratory wheeze. No rales  Cardiovascular: RRR without murmur  GI: mildly distended, non-tender, no mass appreciable.  Skin: no lesions.  Other:      Medical Decision Making       50 MINUTES SPENT BY ME on the date of service doing chart review, history, exam, documentation & further activities per the note.      Data   Results for orders placed or performed during the hospital encounter of 02/20/25 (from the past 24 hours)   Blood gas venous   Result Value Ref Range    pH Venous 7.42 7.32 - 7.43    pCO2 Venous 35 (L) 40 - 50 mm Hg    pO2 Venous 24 (L) 25 - 47 mm Hg    Bicarbonate Venous 23 21 - 28 mmol/L    Base Excess/Deficit Venous -1.2 -3.0 - 3.0 mmol/L    FIO2 0     Oxyhemoglobin Venous 40 (L) 70 - 75 %    O2 Sat, Venous 41.0 (L) 70.0 - " 75.0 %    Narrative    In healthy individuals, oxyhemoglobin (O2Hb) and oxygen saturation (SO2) are approximately equal. In the presence of dyshemoglobins, oxyhemoglobin can be considerably lower than oxygen saturation.   Lactic acid whole blood   Result Value Ref Range    Lactic Acid 1.7 0.7 - 2.0 mmol/L   Basic metabolic panel   Result Value Ref Range    Sodium 132 (L) 135 - 145 mmol/L    Potassium 3.3 (L) 3.4 - 5.3 mmol/L    Chloride 102 98 - 107 mmol/L    Carbon Dioxide (CO2) 18 (L) 22 - 29 mmol/L    Anion Gap 12 7 - 15 mmol/L    Urea Nitrogen 17.0 8.0 - 23.0 mg/dL    Creatinine 1.41 (H) 0.67 - 1.17 mg/dL    GFR Estimate 56 (L) >60 mL/min/1.73m2    Calcium 7.2 (L) 8.8 - 10.4 mg/dL    Glucose 114 (H) 70 - 99 mg/dL   Magnesium   Result Value Ref Range    Magnesium 1.4 (L) 1.7 - 2.3 mg/dL   Phosphorus   Result Value Ref Range    Phosphorus 1.7 (L) 2.5 - 4.5 mg/dL   Potassium   Result Value Ref Range    Potassium 3.7 3.4 - 5.3 mmol/L   Phosphorus   Result Value Ref Range    Phosphorus 1.3 (L) 2.5 - 4.5 mg/dL   Magnesium   Result Value Ref Range    Magnesium 1.5 (L) 1.7 - 2.3 mg/dL   Influenza A/B, RSV and SARS-CoV2 PCR (COVID-19) Nasopharyngeal    Specimen: Nasopharyngeal; Swab   Result Value Ref Range    Influenza A PCR Negative Negative    Influenza B PCR Negative Negative    RSV PCR Negative Negative    SARS CoV2 PCR Negative Negative    Narrative    Testing was performed using the Xpert Xpress CoV2/Flu/RSV Assay on the Cepheid GeneXpert Instrument. This test should be ordered for the detection of SARS-CoV2, influenza, and RSV viruses in individuals with signs and symptoms of respiratory tract infection. This test is for in vitro diagnostic use under the [place] FDA for laboratories certified under CLIA to perform high or moderate complexity testing. This test has been [place] FDA cleared. A negative result does not rule out the presence of PCR inhibitors in the specimen or target RNA in concentration below the limit of  detection for the assay. If only one viral target is positive but coinfection with multiple targets is suspected, the sample should be re-tested with another FDA cleared, approved, or authorized test, if coninfection would change clinical management. This test was validated by the [facility]. These laboratories are certified under the Clinical Laboratory Improvement Amendments of 1988 (CLIA-88) as qualified to perfom high complexity laboratory testing.   Basic metabolic panel   Result Value Ref Range    Sodium 136 135 - 145 mmol/L    Potassium 3.7 3.4 - 5.3 mmol/L    Chloride 104 98 - 107 mmol/L    Carbon Dioxide (CO2) 20 (L) 22 - 29 mmol/L    Anion Gap 12 7 - 15 mmol/L    Urea Nitrogen 12.3 8.0 - 23.0 mg/dL    Creatinine 1.38 (H) 0.67 - 1.17 mg/dL    GFR Estimate 57 (L) >60 mL/min/1.73m2    Calcium 7.6 (L) 8.8 - 10.4 mg/dL    Glucose 120 (H) 70 - 99 mg/dL   CBC with platelets   Result Value Ref Range    WBC Count 9.4 4.0 - 11.0 10e3/uL    RBC Count 3.66 (L) 4.40 - 5.90 10e6/uL    Hemoglobin 11.9 (L) 13.3 - 17.7 g/dL    Hematocrit 34.9 (L) 40.0 - 53.0 %    MCV 95 78 - 100 fL    MCH 32.5 26.5 - 33.0 pg    MCHC 34.1 31.5 - 36.5 g/dL    RDW 12.0 10.0 - 15.0 %    Platelet Count 129 (L) 150 - 450 10e3/uL   Magnesium   Result Value Ref Range    Magnesium 2.3 1.7 - 2.3 mg/dL   Phosphorus   Result Value Ref Range    Phosphorus 2.0 (L) 2.5 - 4.5 mg/dL

## 2025-02-22 NOTE — PROVIDER NOTIFICATION
Paged crosscover: I see patients 2 mg PO dilaudid was discontinued, this has been working for him. Can you re-order? also, pt just received PRN albuterol neb for tachypnea and upper airway wheezing, RT mentioned he thinks maybe a respiratory swab for influenza could be beneficial, thoughts?    Response: sure, orders obtained

## 2025-02-22 NOTE — PLAN OF CARE
"Goal Outcome Evaluation:      Plan of Care Reviewed With: patient    Overall Patient Progress: no changeOverall Patient Progress: no change    VSS. Tmax 100.8 tylenol x1 given. Recheck 99.0. Alert and oriented. Up AX1 in room d/t dizziness and unsteady gait. LS diminished with some intermittent expiratory wheezes. X1 Neb given with improvement. Rating 3-8/10 abdominal pressure and headache pain. Pain managed with IV dilaudid x1, and tylenol. Pt voiding frequently. Bladder scanning with post void. PVR zero. Abdomen rounded, taut and distended. Pt passing gas and had 3 loose BM but still feeling \"full\". Intermittent nausea x1. Zofran x1 given. Potassium 3.3 replacement ordered. Plan: VSS, pain/nausea control, IVF, IV abx, strict I&Os and trend labs.    Problem: Adult Inpatient Plan of Care  Goal: Plan of Care Review  Description: The Plan of Care Review/Shift note should be completed every shift.  The Outcome Evaluation is a brief statement about your assessment that the patient is improving, declining, or no change.  This information will be displayed automatically on your shift  note.  Outcome: Progressing  Flowsheets (Taken 2/21/2025 1819)  Plan of Care Reviewed With: patient  Overall Patient Progress: no change  Goal: Patient-Specific Goal (Individualized)  Description: You can add care plan individualizations to a care plan. Examples of Individualization might be:  \"Parent requests to be called daily at 9am for status\", \"I have a hard time hearing out of my right ear\", or \"Do not touch me to wake me up as it startles  me\".  Outcome: Progressing  Goal: Absence of Hospital-Acquired Illness or Injury  Outcome: Progressing  Intervention: Identify and Manage Fall Risk  Recent Flowsheet Documentation  Taken 2/21/2025 7812 by Uzma Brice, RN  Safety Promotion/Fall Prevention:   activity supervised   assistive device/personal items within reach   clutter free environment maintained   increased rounding and " observation   increase visualization of patient   lighting adjusted   nonskid shoes/slippers when out of bed   patient and family education   room near nurse's station   safety round/check completed   supervised activity  Goal: Optimal Comfort and Wellbeing  Outcome: Progressing  Intervention: Monitor Pain and Promote Comfort  Recent Flowsheet Documentation  Taken 2/21/2025 0901 by Uzma Brice RN  Pain Management Interventions: rest  Goal: Readiness for Transition of Care  Outcome: Progressing

## 2025-02-22 NOTE — PLAN OF CARE
"Goal Outcome Evaluation:      Plan of Care Reviewed With: patient, spouse, child    Overall Patient Progress: no changeOverall Patient Progress: no change    VSS. Tmax 99.6. RR 24-40. Tachypnea, wheezing, and SOB with exertion. PRN albuterol neb x1 with no change. Maintaining O2 saturation. Pt feels like he is taking shallow breaths and it is difficult to breath deeply. Intermittent nausea, compazine x1. 3-5/10 headache, back pain, bladder pressure. Scheduled tylenol, po dilaudid x1, heat in use. Multiple watery Bms overnight, slowed throughout shift. Voiding adequately, post void bladder scan x1 for 0 mL. Abdomen tender, distended, slightly firm. Passing gas. Tolerating clears, sips of water. K+/Mg/Phos replaced overnight, lab re-draw AM. Wife at bedside, assisting with patient cares.    Problem: Adult Inpatient Plan of Care  Goal: Plan of Care Review  Description: The Plan of Care Review/Shift note should be completed every shift.  The Outcome Evaluation is a brief statement about your assessment that the patient is improving, declining, or no change.  This information will be displayed automatically on your shift  note.  Outcome: Not Progressing  Flowsheets (Taken 2/22/2025 0751)  Plan of Care Reviewed With:   patient   spouse   child  Overall Patient Progress: no change  Goal: Patient-Specific Goal (Individualized)  Description: You can add care plan individualizations to a care plan. Examples of Individualization might be:  \"Parent requests to be called daily at 9am for status\", \"I have a hard time hearing out of my right ear\", or \"Do not touch me to wake me up as it startles  me\".  Outcome: Not Progressing  Goal: Absence of Hospital-Acquired Illness or Injury  Outcome: Not Progressing  Intervention: Identify and Manage Fall Risk  Recent Flowsheet Documentation  Taken 2/21/2025 2100 by Carol Donovan, RN  Safety Promotion/Fall Prevention:   activity supervised   assistive device/personal items within reach   " clutter free environment maintained   increased rounding and observation   increase visualization of patient   lighting adjusted   nonskid shoes/slippers when out of bed   patient and family education   room near nurse's station   safety round/check completed   supervised activity  Intervention: Prevent Skin Injury  Recent Flowsheet Documentation  Taken 2/21/2025 2100 by Carol Donovan RN  Body Position: position changed independently  Intervention: Prevent Infection  Recent Flowsheet Documentation  Taken 2/21/2025 2100 by Carol Donovan RN  Infection Prevention:   cohorting utilized   environmental surveillance performed   equipment surfaces disinfected   hand hygiene promoted   rest/sleep promoted   single patient room provided  Goal: Optimal Comfort and Wellbeing  Outcome: Not Progressing  Intervention: Monitor Pain and Promote Comfort  Recent Flowsheet Documentation  Taken 2/22/2025 0620 by Carol Donovan RN  Pain Management Interventions: heat applied  Taken 2/22/2025 0036 by Carol Donovan RN  Pain Management Interventions:   medication (see MAR)   heat applied  Goal: Readiness for Transition of Care  Outcome: Not Progressing     Problem: UTI (Urinary Tract Infection)  Goal: Improved Infection Symptoms  Outcome: Not Progressing  Intervention: Prevent Infection Progression  Recent Flowsheet Documentation  Taken 2/21/2025 2100 by Carol Donovan RN  Bladder Spasm Management: (meds) other (see comments)  Intervention: Facilitate Optimal Urinary Elimination  Recent Flowsheet Documentation  Taken 2/21/2025 2100 by Carol Donovan RN  Urinary Elimination Promotion:   frequent voiding encouraged   toileting offered   voiding relaxation promoted

## 2025-02-22 NOTE — PLAN OF CARE
"Goal Outcome Evaluation:      Plan of Care Reviewed With: patient, spouse, family    Overall Patient Progress: no changeOverall Patient Progress: no change    Orientation: Alert and oriented x4 .   VSS. Tmax 100.3   Resp: SOB with excretion RR 24-26. LS diminished, crackles, and wheezy expiratory. PRN neb x1 given with improvement.   GI: Pt Passing gas. x3 loose BM.  Stool sample collected. Rule out Cdiff. Denies N/V.   : Adequate urine output.   Neurovascular: No numbness or tingling present. Mild trace edema on bilateral legs.   Activity: SBA   Pain: Raiting pain 2-7/10. Pain managed with scheduled tylenol and dilaudid x2.   Updates/Plan: VSS, pain control, IV abx, strict I&O's.   Problem: Adult Inpatient Plan of Care  Goal: Plan of Care Review  Description: The Plan of Care Review/Shift note should be completed every shift.  The Outcome Evaluation is a brief statement about your assessment that the patient is improving, declining, or no change.  This information will be displayed automatically on your shift  note.  2/22/2025 1749 by Uzma Brice RN  Outcome: Progressing  Flowsheets (Taken 2/22/2025 1749)  Overall Patient Progress: improving  2/22/2025 1749 by Uzma Brice RN  Outcome: Progressing  Flowsheets (Taken 2/22/2025 1749)  Plan of Care Reviewed With:   patient   spouse   family  Overall Patient Progress: improving  Goal: Patient-Specific Goal (Individualized)  Description: You can add care plan individualizations to a care plan. Examples of Individualization might be:  \"Parent requests to be called daily at 9am for status\", \"I have a hard time hearing out of my right ear\", or \"Do not touch me to wake me up as it startles  me\".  2/22/2025 1749 by Uzma Brice RN  Outcome: Progressing  2/22/2025 1749 by Uzma Brice, RN  Outcome: Progressing  Goal: Absence of Hospital-Acquired Illness or Injury  2/22/2025 1749 by Uzma Brice, RN  Outcome: Progressing  2/22/2025 1749 by " Uzma Brice, RN  Outcome: Progressing  Intervention: Identify and Manage Fall Risk  Recent Flowsheet Documentation  Taken 2/22/2025 0848 by Uzma Brice, RN  Safety Promotion/Fall Prevention:   activity supervised   assistive device/personal items within reach   clutter free environment maintained   increased rounding and observation   increase visualization of patient   lighting adjusted   nonskid shoes/slippers when out of bed   patient and family education   room near nurse's station   safety round/check completed   supervised activity  Intervention: Prevent Skin Injury  Recent Flowsheet Documentation  Taken 2/22/2025 0848 by Uzma Brice, RN  Body Position: position changed independently  Intervention: Prevent Infection  Recent Flowsheet Documentation  Taken 2/22/2025 0848 by Uzma Brice, RN  Infection Prevention:   cohorting utilized   environmental surveillance performed   equipment surfaces disinfected   hand hygiene promoted   rest/sleep promoted   single patient room provided  Goal: Optimal Comfort and Wellbeing  2/22/2025 1749 by Uzma Brice, RN  Outcome: Progressing  2/22/2025 1749 by Uzma Brice, RN  Outcome: Progressing  Goal: Readiness for Transition of Care  2/22/2025 1749 by Uzma Brice, RN  Outcome: Progressing  2/22/2025 1749 by Uzma Brice, RN  Outcome: Progressing

## 2025-02-22 NOTE — CONSULTS
"[facility]  INFECTIOUS DISEASES CONSULTATION  Benedict De Anda 2/22/2025    History  65 yo man with history of newly discovered nephrolithiasis and recent hospitalization from 2/16 - 2/17/25 for left ureteral stent placement with laser lithotripsy with basket stone extraction of a 6 x 4 mm stone in the proximal left ureter.  During the admission, he was noted ot have a 5 cm heterogeneous mass in the L kidney worrisome for RCCA  He discharged home and noted a  new kind of l kidney pain a few days later  He then developed shaking chills and  came back to the ED on 2/20/25 with lightheadedness and L flank pain. He was discharged on oral cipro but then returned to the ED with new headache and confusion.  In the ED, CR was 1.36, lactic acid 1.9, WBC 16.1,    UA with pyuria   CT abd/pelvis showed, \"Interval placement of left double-J ureteral stent. Left-sided hydronephrosis has decreased slightly. No evidence of stone material along the stent. Perinephric and periureteral stranding and edema has increased slightly, and could be related to infection...\"  He was started on iv ceftriaxone  UC now growing Enterococcus faecalis, amp sens  He is amox allergic  Vancomyicn was added  Some loose stools now     ROS: 10 point ROS neg other than the symptoms noted above in the HPI.    Past Medical History:   Diagnosis Date    Allergic rhinitis, cause unspecified 8/22/2005    testing 2006    Herpes simplex without mention of complication 8/22/2005     Past Surgical History:   Procedure Laterality Date    COMBINED CYSTOSCOPY, RETROGRADES, URETEROSCOPY, LASER HOLMIUM LITHOTRIPSY URETER(S), INSERT STENT Left 2/16/2025    Procedure: Cystourethroscopy Left ureteroscopy Left retrograde pyelogram with interpretation of intraoperative fluoroscopic imaging Holmium laser lithotripsy with basket stone extraction Left ureteral stent placement;  Surgeon: Xena Nj MD;  Location:  OR     KNEE SCOPE, W/LATERAL " RELEASE  1992    knee spur     Social History     Social History Narrative    Not on file     Family History   Problem Relation Age of Onset    C.A.D. Father         bypass    Hypertension Father     Lipids Father     Diabetes No family hx of           Allergies   Allergen Reactions    Amoxicillin Hives     Current Facility-Administered Medications   Medication Dose Route Frequency Provider Last Rate Last Admin    acetaminophen (TYLENOL) tablet 975 mg  975 mg Oral Q8H Chencho Alston MD   975 mg at 02/22/25 0911    albuterol (PROVENTIL) neb solution 2.5 mg  2.5 mg Nebulization Q2H PRN Chencho Alston MD   2.5 mg at 02/22/25 1250    calcium carbonate (TUMS) chewable tablet 1,000 mg  1,000 mg Oral 4x Daily PRN Humberto Fam DO   1,000 mg at 02/22/25 1220    cefTRIAXone (ROCEPHIN) 2 g vial to attach to  ml bag for ADULTS or NS 50 ml bag for PEDS  2 g Intravenous Q24H Humberto Fam DO   2 g at 02/22/25 1440    HYDROmorphone (DILAUDID) tablet 2 mg  2 mg Oral Q4H PRN Harshil Douglas MD   2 mg at 02/22/25 1346    HYDROmorphone (PF) (DILAUDID) injection 0.5 mg  0.5 mg Intravenous Q2H PRN Chencho Alston MD        lidocaine (LMX4) cream   Topical Q1H PRN Humberto Fam DO        lidocaine 1 % 0.1-1 mL  0.1-1 mL Other Q1H PRN Humberto Fam DO        naloxone (NARCAN) injection 0.2 mg  0.2 mg Intravenous Q2 Min PRN Humberto Fam DO        Or    naloxone (NARCAN) injection 0.4 mg  0.4 mg Intravenous Q2 Min PRN Humberto Fam DO        Or    naloxone (NARCAN) injection 0.2 mg  0.2 mg Intramuscular Q2 Min PRN Humberto Fam DO        Or    naloxone (NARCAN) injection 0.4 mg  0.4 mg Intramuscular Q2 Min PRN Humberto Fam DO        ondansetron (ZOFRAN ODT) ODT tab 4 mg  4 mg Oral Q6H PRN Humberto Fam DO   4 mg at 02/21/25 1806    Or    ondansetron (ZOFRAN) injection 4 mg  4 mg Intravenous Q6H PRN Humberto Fam DO   4 mg at 02/20/25 8063  "   oxyBUTYnin (DITROPAN) tablet 5 mg  5 mg Oral TID PRN Humberto Fam DO   5 mg at 02/21/25 0613    polyethylene glycol (MIRALAX) Packet 17 g  17 g Oral BID PRN Humberto Fam DO   17 g at 02/21/25 0855    potassium & sodium phosphates (NEUTRA-PHOS) Packet 1 packet  1 packet Oral or Feeding Tube Q4H Chencho Alston MD   1 packet at 02/22/25 1507    prochlorperazine (COMPAZINE) injection 10 mg  10 mg Intravenous Q6H PRN Chencho Alston MD   10 mg at 02/21/25 1957    Or    prochlorperazine (COMPAZINE) tablet 10 mg  10 mg Oral Q6H PRN Chencho Alston MD        senna-docusate (SENOKOT-S/PERICOLACE) 8.6-50 MG per tablet 1 tablet  1 tablet Oral BID PRN Humberto Fam DO        Or    senna-docusate (SENOKOT-S/PERICOLACE) 8.6-50 MG per tablet 2 tablet  2 tablet Oral BID PRN Humberto Fam DO        sodium chloride (PF) 0.9% PF flush 3 mL  3 mL Intracatheter Q8H Humberto Fam DO   3 mL at 02/20/25 2008    sodium chloride (PF) 0.9% PF flush 3 mL  3 mL Intracatheter q1 min prn Humberto Fam DO        tamsulosin (FLOMAX) capsule 0.4 mg  0.4 mg Oral Daily Humberto Fam DO   0.4 mg at 02/22/25 0911    valACYclovir (VALTREX) tablet 500 mg  500 mg Oral Daily Humberto Fam DO   500 mg at 02/22/25 0910    vancomycin (VANCOCIN) 1,500 mg in 0.9% NaCl 265 mL intermittent infusion  1,500 mg Intravenous Q24H Chencho Alston MD   1,500 mg at 02/22/25 1155           Physical Examination  /73   Pulse 82   Temp 100  F (37.8  C) (Oral)   Resp 20   Ht 1.727 m (5' 8\")   Wt 88.7 kg (195 lb 8 oz)   SpO2 92%   BMI 29.73 kg/m    HEENT:, scleral anicteric , no scleral hemorrhages,   Op clear  Neck supple, no MARY KAY  CV: RRR   Lungs  diffuse bilal wheezing , diminished bases  Abd soft, mild diffuse tender,   Back + L CVAT  Ext; no c/c/e, no splinter hemorrhages or nodes    LABORATORY DATA  Lab Results   Component Value Date    WBC 9.4 02/22/2025    WBC 5.2 03/22/2011     Lab " "Results   Component Value Date    RBC 3.66 02/22/2025    RBC 4.71 03/22/2011     Lab Results   Component Value Date    HGB 11.9 02/22/2025    HGB 15.4 03/22/2011     Lab Results   Component Value Date    HCT 34.9 02/22/2025    HCT 43.9 03/22/2011     No components found for: \"MCT\"  Lab Results   Component Value Date    MCV 95 02/22/2025    MCV 93 03/22/2011     Lab Results   Component Value Date    MCH 32.5 02/22/2025    MCH 32.7 03/22/2011     Lab Results   Component Value Date    MCHC 34.1 02/22/2025    MCHC 35.1 03/22/2011     Lab Results   Component Value Date    RDW 12.0 02/22/2025    RDW 12.2 03/22/2011     Lab Results   Component Value Date     02/22/2025     03/22/2011     Last Comprehensive Metabolic Panel:  Sodium   Date Value Ref Range Status   02/22/2025 136 135 - 145 mmol/L Final   12/10/2024 140.3 135 - 146 mmol/L Final     Potassium   Date Value Ref Range Status   02/22/2025 3.7 3.4 - 5.3 mmol/L Final   12/10/2024 4.18 3.5 - 5.3 mmol/L Final     Chloride   Date Value Ref Range Status   02/22/2025 104 98 - 107 mmol/L Final   12/10/2024 106.5 98 - 110 mmol/L Final     Carbon Dioxide   Date Value Ref Range Status   12/10/2024 25.0 20 - 32 mmol/L Final     Carbon Dioxide (CO2)   Date Value Ref Range Status   02/22/2025 20 (L) 22 - 29 mmol/L Final     Anion Gap   Date Value Ref Range Status   02/22/2025 12 7 - 15 mmol/L Final   03/22/2011 8 6 - 17 mmol/L Final     Glucose   Date Value Ref Range Status   02/22/2025 120 (H) 70 - 99 mg/dL Final   12/10/2024 94 60 - 99 mg/dL Final     Urea Nitrogen   Date Value Ref Range Status   02/22/2025 12.3 8.0 - 23.0 mg/dL Final   12/10/2024 22 7 - 25 mg/dL Final     BUN/Creatinine Ratio   Date Value Ref Range Status   12/10/2024 20 6 - 32 Final     Creatinine   Date Value Ref Range Status   02/22/2025 1.38 (H) 0.67 - 1.17 mg/dL Final   12/10/2024 1.10 0.60 - 1.30 mg/dL Final     GFR Estimate   Date Value Ref Range Status   02/22/2025 57 (L) >60 " mL/min/1.73m2 Final     Comment:     eGFR calculated using 2021 CKD-EPI equation.   02/22/2018 74 > OR = 60 mL/min/1.73m2 Final     Calcium   Date Value Ref Range Status   02/22/2025 7.6 (L) 8.8 - 10.4 mg/dL Final   12/10/2024 9.5 8.6 - 10.3 mg/dL Final     Bilirubin Total   Date Value Ref Range Status   02/20/2025 1.7 (H) <=1.2 mg/dL Final   12/10/2024 1.5 (A) 0.2 - 1.2 mg/dL Final     Alkaline Phosphatase   Date Value Ref Range Status   02/20/2025 79 40 - 150 U/L Final   12/10/2024 63 33 - 130 U/L Final     ALT   Date Value Ref Range Status   02/20/2025 26 0 - 70 U/L Final   02/22/2018 62 (H) 9 - 46 U/L Final     AST   Date Value Ref Range Status   02/20/2025 28 0 - 45 U/L Final   02/22/2018 188 (H) 10 - 35 U/L Final       MICROBIOLOGY    2/20/25 BC x 1 NGTD    2/20/25 UC >100,000 CFU/mL Enterococcus faecalis Abnormal      RADIOLOGY  EXAM: CT ABDOMEN PELVIS W CONTRAST  LOCATION: [facility]  DATE: 2/20/2025     INDICATION: Left ureteral stent, rigors.     COMPARISON: 2/16/2025.     TECHNIQUE: CT scan of the abdomen and pelvis was performed following injection of intravenous contrast. Multiplanar reformats were obtained. Dose reduction techniques were used.  CONTRAST: 100 mL Isovue 370.  FINDINGS:   LOWER CHEST: Atelectasis is present in both bases.  HEPATOBILIARY: Normal.  PANCREAS: Normal.  SPLEEN: Borderline splenomegaly at 13.1 cm. No focal lesions.  ADRENAL GLANDS: Normal.  KIDNEYS/BLADDER: There has been interval placement of a left double-J ureteral stent. Left-sided hydronephrosis has decreased slightly. No evidence of stone material along the stent. Perinephric and periureteral stranding is slightly increased from the   previous study. Punctate nonobstructing stone in the lower pole left kidney on image 94 of series 3. Otherwise, no evidence for renal or ureteral calculi through either kidney. Heterogeneous mass arising from the posterior mid to lower left kidney is   again noted and  concerning for renal cell carcinoma. Tiny amount of nondependent gas is seen within the bladder. No evidence for bladder mass.  BOWEL: No colonic wall thickening or inflammatory change.  LYMPH NODES: Normal.  VASCULATURE: Normal.  PELVIC ORGANS: No free fluid or adenopathy through the pelvis.  MUSCULOSKELETAL: Mild degenerative changes are noted through the spine.                                                          IMPRESSION:   1.  Interval placement of left double-J ureteral stent. Left-sided hydronephrosis has decreased slightly. No evidence of stone material along the stent. Perinephric and periureteral stranding and edema has increased slightly, and could be related to   infection.  2.  Punctate nonobstructing stone in the lower pole left kidney.  3.  Stable heterogeneous mass arising from the posterior mid to lower left kidney concerning for renal cell carcinoma.     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     Recommendations  Continue the ceftriaxone for now, may stop in 1-2 d if all other cx remain neg or grow  only Enterococcus  Continue the iv vancomycin with goal trough of 13-18  Follow up on blood cx  Stool for c diff  Planning 2 weeks therapy although this could be extended given the stent (if still in) and possibly in consideration of the tumor there      Thank you very much for this consultation      Kyle Pack MD

## 2025-02-23 LAB
ANION GAP SERPL CALCULATED.3IONS-SCNC: 9 MMOL/L (ref 7–15)
BUN SERPL-MCNC: 11 MG/DL (ref 8–23)
CALCIUM SERPL-MCNC: 7.8 MG/DL (ref 8.8–10.4)
CHLORIDE SERPL-SCNC: 108 MMOL/L (ref 98–107)
CREAT SERPL-MCNC: 1.13 MG/DL (ref 0.67–1.17)
EGFRCR SERPLBLD CKD-EPI 2021: 73 ML/MIN/1.73M2
ERYTHROCYTE [DISTWIDTH] IN BLOOD BY AUTOMATED COUNT: 12.1 % (ref 10–15)
GLUCOSE SERPL-MCNC: 100 MG/DL (ref 70–99)
HCO3 SERPL-SCNC: 21 MMOL/L (ref 22–29)
HCT VFR BLD AUTO: 32.7 % (ref 40–53)
HGB BLD-MCNC: 11.5 G/DL (ref 13.3–17.7)
MAGNESIUM SERPL-MCNC: 2 MG/DL (ref 1.7–2.3)
MCH RBC QN AUTO: 32.9 PG (ref 26.5–33)
MCHC RBC AUTO-ENTMCNC: 35.2 G/DL (ref 31.5–36.5)
MCV RBC AUTO: 93 FL (ref 78–100)
PHOSPHATE SERPL-MCNC: 2 MG/DL (ref 2.5–4.5)
PLATELET # BLD AUTO: 124 10E3/UL (ref 150–450)
POTASSIUM SERPL-SCNC: 3.7 MMOL/L (ref 3.4–5.3)
RBC # BLD AUTO: 3.5 10E6/UL (ref 4.4–5.9)
SODIUM SERPL-SCNC: 138 MMOL/L (ref 135–145)
WBC # BLD AUTO: 7 10E3/UL (ref 4–11)

## 2025-02-23 PROCEDURE — 80048 BASIC METABOLIC PNL TOTAL CA: CPT | Performed by: INTERNAL MEDICINE

## 2025-02-23 PROCEDURE — 250N000013 HC RX MED GY IP 250 OP 250 PS 637: Performed by: INTERNAL MEDICINE

## 2025-02-23 PROCEDURE — 999N000127 HC STATISTIC PERIPHERAL IV START W US GUIDANCE

## 2025-02-23 PROCEDURE — 84100 ASSAY OF PHOSPHORUS: CPT | Performed by: INTERNAL MEDICINE

## 2025-02-23 PROCEDURE — 120N000004 HC R&B MS OVERFLOW

## 2025-02-23 PROCEDURE — 258N000003 HC RX IP 258 OP 636: Performed by: INTERNAL MEDICINE

## 2025-02-23 PROCEDURE — 250N000011 HC RX IP 250 OP 636: Performed by: INTERNAL MEDICINE

## 2025-02-23 PROCEDURE — 99232 SBSQ HOSP IP/OBS MODERATE 35: CPT | Performed by: INTERNAL MEDICINE

## 2025-02-23 PROCEDURE — 82374 ASSAY BLOOD CARBON DIOXIDE: CPT | Performed by: INTERNAL MEDICINE

## 2025-02-23 PROCEDURE — 85018 HEMOGLOBIN: CPT | Performed by: INTERNAL MEDICINE

## 2025-02-23 PROCEDURE — 83735 ASSAY OF MAGNESIUM: CPT | Performed by: INTERNAL MEDICINE

## 2025-02-23 PROCEDURE — 36415 COLL VENOUS BLD VENIPUNCTURE: CPT | Performed by: INTERNAL MEDICINE

## 2025-02-23 RX ORDER — FUROSEMIDE 10 MG/ML
20 INJECTION INTRAMUSCULAR; INTRAVENOUS ONCE
Status: COMPLETED | OUTPATIENT
Start: 2025-02-23 | End: 2025-02-23

## 2025-02-23 RX ADMIN — VALACYCLOVIR HYDROCHLORIDE 500 MG: 500 TABLET, FILM COATED ORAL at 08:46

## 2025-02-23 RX ADMIN — VANCOMYCIN HYDROCHLORIDE 1500 MG: 5 INJECTION, POWDER, LYOPHILIZED, FOR SOLUTION INTRAVENOUS at 11:40

## 2025-02-23 RX ADMIN — TAMSULOSIN HYDROCHLORIDE 0.4 MG: 0.4 CAPSULE ORAL at 08:46

## 2025-02-23 RX ADMIN — CALCIUM CARBONATE (ANTACID) CHEW TAB 500 MG 1000 MG: 500 CHEW TAB at 14:19

## 2025-02-23 RX ADMIN — CEFTRIAXONE 2 G: 2 INJECTION, POWDER, FOR SOLUTION INTRAMUSCULAR; INTRAVENOUS at 15:32

## 2025-02-23 RX ADMIN — POTASSIUM & SODIUM PHOSPHATES POWDER PACK 280-160-250 MG 1 PACKET: 280-160-250 PACK at 08:48

## 2025-02-23 RX ADMIN — FUROSEMIDE 20 MG: 10 INJECTION, SOLUTION INTRAMUSCULAR; INTRAVENOUS at 16:25

## 2025-02-23 RX ADMIN — POTASSIUM & SODIUM PHOSPHATES POWDER PACK 280-160-250 MG 1 PACKET: 280-160-250 PACK at 15:32

## 2025-02-23 RX ADMIN — HYDROMORPHONE HYDROCHLORIDE 2 MG: 2 TABLET ORAL at 11:46

## 2025-02-23 RX ADMIN — ACETAMINOPHEN 975 MG: 325 TABLET, FILM COATED ORAL at 19:00

## 2025-02-23 RX ADMIN — HYDROMORPHONE HYDROCHLORIDE 2 MG: 2 TABLET ORAL at 08:45

## 2025-02-23 RX ADMIN — ACETAMINOPHEN 975 MG: 325 TABLET, FILM COATED ORAL at 10:29

## 2025-02-23 RX ADMIN — POTASSIUM & SODIUM PHOSPHATES POWDER PACK 280-160-250 MG 1 PACKET: 280-160-250 PACK at 11:40

## 2025-02-23 RX ADMIN — HYDROMORPHONE HYDROCHLORIDE 2 MG: 2 TABLET ORAL at 15:34

## 2025-02-23 RX ADMIN — ACETAMINOPHEN 975 MG: 325 TABLET, FILM COATED ORAL at 02:09

## 2025-02-23 NOTE — PROGRESS NOTES
"[facility]    Medicine Progress Note - Hospitalist Service    Date of Admission:  2/20/2025    Assessment & Plan   Benedict De Anda is a 64 year old male who came to attention on 2/20/2025 due to concern for new and rather abrupt onset of confusion and headache.      His recent medical history includes hospitalization from 2/16-2/17 left ureteral stent placement with laser lithotripsy with basket stone extraction for management of a 6 x 4 mm calculus in the proximal left ureter.  He was also incidentally noted to have a 5 cm heterogeneously enhancing mass in the left kidney compatible with renal cell carcinoma.  The patient then returned to the emergency department on 2/20/2025 early in the morning with concern for flank pain and dizziness at which point he reported fever and dysuria.  Urology was contacted but patient was discharged home with ciprofloxacin.  It was in this setting that the patient had return to the emergency department with confusion and headache.    PMH includes nephrolithiasis.    On presentation to the emergency department, VS: /64 came down to 104/53 over the course of about 1/2-hour.  HR 91, RR 16 with oxygen saturation 95% breathing room air.  Temperature 98.6.  Exam: Patient was described as \"fatigued but nontoxic\"-appearing.  Labs: Creatinine 1.36 with normal electrolytes.  Total bilirubin 1.7.  Glucose 120.  Lactic acid 1.9.  VBG pH 7.44 with pCO2 36.  WBC 16.1, Hgb 14.2, .  Urinalysis from earlier in the day was strongly positive and had been sent for urine culture.  Blood cultures were also obtained in the ED at the time of admission.  Imaging: CT abdomen and pelvis with contrast shows \"Interval placement of left double-J ureteral stent. Left-sided hydronephrosis has decreased slightly. No evidence of stone material along the stent. Perinephric and periureteral stranding and edema has increased slightly, and could be related to   infection...\"   " Adilson was started on Ceftriaxone 2 g IV q 24 hours and NS at 100 ml/hr.    Since admission, Mr. De Anda has done well. He is now more comfortable, though he did have an episode of drenching sweats at night.     DX:  Sepsis with mild acute kidney injury, mild infectious encephalopathy, elevated white cell count and fevers.  Improving/stable. He has grown an Enterococcus from the urine, but I am concerned that he may have had a GNR that we have been unable to culture due to his exposure to antibiotics.    Complicated urinary tract infection status post left ureteral instrumentation and lithotripsy.  Acute kidney injury.  Baseline creatinine 1.1.  Very mild acute encephalopathy. Although this is mentioned by family it would be compatible with the moderate sepsis syndrome (i.e. infectious encephalopathy), it will be managed by treating the UTI.  GIA/MDD.  SOB with mild hypoxia due to iatrogenic volume overload.   Diarrhea likely related abx.  Infectious work up negative.  PLAN:  Start Vanco to cover for the Enterococcus. Pt describes significant PCN allergy.  Continues on Ceftriaxone until cultures are otherwise resulted.   Strict I & O.  Repeat dose of Lasix this afternoon.      Diet: Combination Diet Regular Diet Adult    DVT Prophylaxis: Pneumatic Compression Devices  Lopez Catheter: Not present  Lines: None     Cardiac Monitoring: None  Code Status: Full Code      Clinically Significant Risk Factors        # Hypokalemia: Lowest K = 3.3 mmol/L in last 2 days, will replace as needed  # Hyponatremia: Lowest Na = 132 mmol/L in last 2 days, will monitor as appropriate  # Hyperchloremia: Highest Cl = 108 mmol/L in last 2 days, will monitor as appropriate        # Hypomagnesemia: Lowest Mg = 1.4 mg/dL in last 2 days, will replace as needed     # Thrombocytopenia: Lowest platelets = 124 in last 2 days, will monitor for bleeding              # Overweight: Estimated body mass index is 29.73 kg/m  as calculated from the  "following:    Height as of this encounter: 1.727 m (5' 8\").    Weight as of this encounter: 88.7 kg (195 lb 8 oz)., PRESENT ON ADMISSION            Social Drivers of Health    Tobacco Use: Medium Risk (12/10/2024)    Patient History     Smoking Tobacco Use: Former     Smokeless Tobacco Use: Never     Passive Exposure: Past          Disposition Plan     Medically Ready for Discharge: Anticipated in 2-4 Days      Chencho Alston MD  Hospitalist Service  [facility]  Securely message with [facility] (more info)  Text page via [employer] Paging/Directory   ______________________________________________________________________    Interval History   Stable night per nursing documentation.     Feeling better, but had an episode of drenching sweats last night.   Mildly SOB still.     Physical Exam   Vital Signs: Temp: 98.4  F (36.9  C) Temp src: Oral BP: 129/86 Pulse: 71   Resp: 22 SpO2: 100 % O2 Device: None (Room air) Oxygen Delivery: 4 LPM  Weight: 195 lbs 8 oz    General Appearance: Alert and coherent. Family feels he is confused though this appears fully resolved at this time.   Respiratory:  Good air entry throughout, scant end expiratory wheeze. No rales  Cardiovascular: RRR without murmur  GI: mildly distended, non-tender, no mass appreciable.  Skin: no lesions.  Other:      Medical Decision Making       50 MINUTES SPENT BY ME on the date of service doing chart review, history, exam, documentation & further activities per the note.      Data   Results for orders placed or performed during the hospital encounter of 02/20/25 (from the past 24 hours)   Basic metabolic panel   Result Value Ref Range    Sodium 138 135 - 145 mmol/L    Potassium 3.7 3.4 - 5.3 mmol/L    Chloride 108 (H) 98 - 107 mmol/L    Carbon Dioxide (CO2) 21 (L) 22 - 29 mmol/L    Anion Gap 9 7 - 15 mmol/L    Urea Nitrogen 11.0 8.0 - 23.0 mg/dL    Creatinine 1.13 0.67 - 1.17 mg/dL    GFR Estimate 73 >60 mL/min/1.73m2    Calcium 7.8 (L) 8.8 - " 10.4 mg/dL    Glucose 100 (H) 70 - 99 mg/dL   CBC with platelets   Result Value Ref Range    WBC Count 7.0 4.0 - 11.0 10e3/uL    RBC Count 3.50 (L) 4.40 - 5.90 10e6/uL    Hemoglobin 11.5 (L) 13.3 - 17.7 g/dL    Hematocrit 32.7 (L) 40.0 - 53.0 %    MCV 93 78 - 100 fL    MCH 32.9 26.5 - 33.0 pg    MCHC 35.2 31.5 - 36.5 g/dL    RDW 12.1 10.0 - 15.0 %    Platelet Count 124 (L) 150 - 450 10e3/uL   Magnesium   Result Value Ref Range    Magnesium 2.0 1.7 - 2.3 mg/dL   Phosphorus   Result Value Ref Range    Phosphorus 2.0 (L) 2.5 - 4.5 mg/dL

## 2025-02-23 NOTE — PLAN OF CARE
"Goal Outcome Evaluation:      Plan of Care Reviewed With: patient    Overall Patient Progress: improvingOverall Patient Progress: improving    VSS. RA. A/Ox4. 2/10 abdominal pain, scheduled tylenol and heat. IVSL. Denies nausea. Abdomen distended, firm. Having diarrhea. Voiding adequately. Strict I/Os. Sleeping between cares.    Problem: Adult Inpatient Plan of Care  Goal: Plan of Care Review  Description: The Plan of Care Review/Shift note should be completed every shift.  The Outcome Evaluation is a brief statement about your assessment that the patient is improving, declining, or no change.  This information will be displayed automatically on your shift  note.  Outcome: Progressing  Flowsheets (Taken 2/22/2025 2245)  Plan of Care Reviewed With: patient  Overall Patient Progress: improving  Goal: Patient-Specific Goal (Individualized)  Description: You can add care plan individualizations to a care plan. Examples of Individualization might be:  \"Parent requests to be called daily at 9am for status\", \"I have a hard time hearing out of my right ear\", or \"Do not touch me to wake me up as it startles  me\".  Outcome: Progressing  Goal: Absence of Hospital-Acquired Illness or Injury  Outcome: Progressing  Intervention: Identify and Manage Fall Risk  Recent Flowsheet Documentation  Taken 2/22/2025 2017 by Carol Donovan, RN  Safety Promotion/Fall Prevention:   activity supervised   assistive device/personal items within reach   clutter free environment maintained   increased rounding and observation   increase visualization of patient   lighting adjusted   nonskid shoes/slippers when out of bed   patient and family education   room near nurse's station   safety round/check completed   supervised activity  Intervention: Prevent Skin Injury  Recent Flowsheet Documentation  Taken 2/22/2025 2017 by Carol Donovan, RN  Body Position: position changed independently  Intervention: Prevent Infection  Recent Flowsheet " Documentation  Taken 2/22/2025 2017 by Carol Donovan, RN  Infection Prevention:   cohorting utilized   environmental surveillance performed   equipment surfaces disinfected   hand hygiene promoted   rest/sleep promoted   single patient room provided  Goal: Optimal Comfort and Wellbeing  Outcome: Progressing  Intervention: Monitor Pain and Promote Comfort  Recent Flowsheet Documentation  Taken 2/22/2025 1930 by Carol Donovan, RN  Pain Management Interventions: heat applied  Goal: Readiness for Transition of Care  Outcome: Progressing     Problem: UTI (Urinary Tract Infection)  Goal: Improved Infection Symptoms  Outcome: Progressing

## 2025-02-23 NOTE — PLAN OF CARE
"Goal Outcome Evaluation:      Plan of Care Reviewed With: patient    Overall Patient Progress: improvingOverall Patient Progress: improving    VSS. Alert and Oriented x4. Afebrile. LS wheezy expiratory to clears. Dyspnea on exertion. Up SBA in room. Pt ambulated x3 outside the room. Rating pain 4-5/10. Pain managed with scheduled tylenol and oral dilaudid x3. Eating and drinking.  Denies n/v. Abdomen rounded and distended.  BS active. Loose stool per pt are slowing down. PIV SL btw abx's. Phosphorus 2.0 this morning. Replaced orally x3 and recheck tomorrow morning.   Problem: Adult Inpatient Plan of Care  Goal: Plan of Care Review  Description: The Plan of Care Review/Shift note should be completed every shift.  The Outcome Evaluation is a brief statement about your assessment that the patient is improving, declining, or no change.  This information will be displayed automatically on your shift  note.  Outcome: Progressing  Flowsheets (Taken 2/23/2025 1748)  Plan of Care Reviewed With: patient  Overall Patient Progress: improving  Goal: Patient-Specific Goal (Individualized)  Description: You can add care plan individualizations to a care plan. Examples of Individualization might be:  \"Parent requests to be called daily at 9am for status\", \"I have a hard time hearing out of my right ear\", or \"Do not touch me to wake me up as it startles  me\".  Outcome: Progressing  Goal: Absence of Hospital-Acquired Illness or Injury  Outcome: Progressing  Intervention: Identify and Manage Fall Risk  Recent Flowsheet Documentation  Taken 2/23/2025 0845 by Uzma Brice, RN  Safety Promotion/Fall Prevention:   activity supervised   assistive device/personal items within reach   clutter free environment maintained   lighting adjusted   patient and family education   nonskid shoes/slippers when out of bed   safety round/check completed  Intervention: Prevent Skin Injury  Recent Flowsheet Documentation  Taken 2/23/2025 0845 by " Uzma Brice, RN  Body Position: position changed independently  Skin Protection: adhesive use limited  Intervention: Prevent and Manage VTE (Venous Thromboembolism) Risk  Recent Flowsheet Documentation  Taken 2/23/2025 0845 by Uzma Brice, RN  VTE Prevention/Management: SCDs off (sequential compression devices)  Goal: Optimal Comfort and Wellbeing  Outcome: Progressing  Goal: Readiness for Transition of Care  Outcome: Progressing

## 2025-02-23 NOTE — PLAN OF CARE
"Goal Outcome Evaluation:      Plan of Care Reviewed With: patient    Overall Patient Progress: improvingOverall Patient Progress: improving    Outcome Evaluation: Patient alert and oriented x4, up SBA with ambulation. C/O abdominal pain, On scheduled tylenol. VSS, afebrile this shift.      Problem: Adult Inpatient Plan of Care  Goal: Plan of Care Review  Description: The Plan of Care Review/Shift note should be completed every shift.  The Outcome Evaluation is a brief statement about your assessment that the patient is improving, declining, or no change.  This information will be displayed automatically on your shift  note.  Outcome: Progressing  Flowsheets (Taken 2/23/2025 0451)  Outcome Evaluation: Patient alert and oriented x4, up SBA with ambulation. C/O abdominal pain, On scheduled tylenol. VSS, afebrile this shift.  Plan of Care Reviewed With: patient  Overall Patient Progress: improving  Goal: Patient-Specific Goal (Individualized)  Description: You can add care plan individualizations to a care plan. Examples of Individualization might be:  \"Parent requests to be called daily at 9am for status\", \"I have a hard time hearing out of my right ear\", or \"Do not touch me to wake me up as it startles  me\".  Outcome: Progressing  Goal: Absence of Hospital-Acquired Illness or Injury  Outcome: Progressing  Intervention: Identify and Manage Fall Risk  Recent Flowsheet Documentation  Taken 2/23/2025 0209 by Jorge Luis Hamlin RN  Safety Promotion/Fall Prevention:   activity supervised   assistive device/personal items within reach   clutter free environment maintained   lighting adjusted   patient and family education   nonskid shoes/slippers when out of bed   safety round/check completed  Intervention: Prevent Skin Injury  Recent Flowsheet Documentation  Taken 2/23/2025 0209 by Jorge Luis Hamlin RN  Body Position: position changed independently  Skin Protection: adhesive use limited  Intervention: Prevent and Manage VTE " (Venous Thromboembolism) Risk  Recent Flowsheet Documentation  Taken 2/23/2025 0209 by Jorge Luis Hamlin RN  VTE Prevention/Management: SCDs off (sequential compression devices)  Intervention: Prevent Infection  Recent Flowsheet Documentation  Taken 2/23/2025 0209 by Jorge Luis Hamlin RN  Infection Prevention:   cohorting utilized   environmental surveillance performed   equipment surfaces disinfected   hand hygiene promoted   rest/sleep promoted   single patient room provided  Goal: Optimal Comfort and Wellbeing  Outcome: Progressing  Goal: Readiness for Transition of Care  Outcome: Progressing     Problem: UTI (Urinary Tract Infection)  Goal: Improved Infection Symptoms  Outcome: Progressing     Problem: Skin Injury Risk Increased  Goal: Skin Health and Integrity  Outcome: Progressing  Intervention: Plan: Nurse Driven Intervention: Moisture Management  Recent Flowsheet Documentation  Taken 2/23/2025 0209 by Jorge Luis Hamlin RN  Moisture Interventions: Encourage regular toileting  Intervention: Optimize Skin Protection  Recent Flowsheet Documentation  Taken 2/23/2025 0209 by Jorge Luis Hamlin RN  Skin Protection: adhesive use limited  Activity Management: activity adjusted per tolerance  Head of Bed (HOB) Positioning: HOB at 20-30 degrees

## 2025-02-23 NOTE — PROVIDER NOTIFICATION
Provider notified of pt increased dyspnea with exertion. Ordered chest x-ray and Lasix post x-ray results.

## 2025-02-24 ENCOUNTER — ENROLLMENT (OUTPATIENT)
Dept: HOME HEALTH SERVICES | Facility: HOME HEALTH | Age: 65
End: 2025-02-24
Payer: COMMERCIAL

## 2025-02-24 LAB
ANION GAP SERPL CALCULATED.3IONS-SCNC: 12 MMOL/L (ref 7–15)
BUN SERPL-MCNC: 12.2 MG/DL (ref 8–23)
CALCIUM SERPL-MCNC: 8 MG/DL (ref 8.8–10.4)
CHLORIDE SERPL-SCNC: 107 MMOL/L (ref 98–107)
CREAT SERPL-MCNC: 1.04 MG/DL (ref 0.67–1.17)
EGFRCR SERPLBLD CKD-EPI 2021: 81 ML/MIN/1.73M2
ERYTHROCYTE [DISTWIDTH] IN BLOOD BY AUTOMATED COUNT: 12.1 % (ref 10–15)
GLUCOSE SERPL-MCNC: 98 MG/DL (ref 70–99)
HCO3 SERPL-SCNC: 21 MMOL/L (ref 22–29)
HCT VFR BLD AUTO: 34.5 % (ref 40–53)
HGB BLD-MCNC: 12.4 G/DL (ref 13.3–17.7)
MAGNESIUM SERPL-MCNC: 1.8 MG/DL (ref 1.7–2.3)
MCH RBC QN AUTO: 32.7 PG (ref 26.5–33)
MCHC RBC AUTO-ENTMCNC: 35.9 G/DL (ref 31.5–36.5)
MCV RBC AUTO: 91 FL (ref 78–100)
PHOSPHATE SERPL-MCNC: 2.3 MG/DL (ref 2.5–4.5)
PLATELET # BLD AUTO: 158 10E3/UL (ref 150–450)
POTASSIUM SERPL-SCNC: 3.2 MMOL/L (ref 3.4–5.3)
POTASSIUM SERPL-SCNC: 3.3 MMOL/L (ref 3.4–5.3)
RBC # BLD AUTO: 3.79 10E6/UL (ref 4.4–5.9)
SODIUM SERPL-SCNC: 140 MMOL/L (ref 135–145)
VANCOMYCIN SERPL-MCNC: 5.2 UG/ML
WBC # BLD AUTO: 6.8 10E3/UL (ref 4–11)

## 2025-02-24 PROCEDURE — 120N000004 HC R&B MS OVERFLOW

## 2025-02-24 PROCEDURE — 99231 SBSQ HOSP IP/OBS SF/LOW 25: CPT | Performed by: PHYSICIAN ASSISTANT

## 2025-02-24 PROCEDURE — 250N000011 HC RX IP 250 OP 636: Performed by: INTERNAL MEDICINE

## 2025-02-24 PROCEDURE — 258N000003 HC RX IP 258 OP 636: Performed by: INTERNAL MEDICINE

## 2025-02-24 PROCEDURE — 99233 SBSQ HOSP IP/OBS HIGH 50: CPT | Performed by: INTERNAL MEDICINE

## 2025-02-24 PROCEDURE — 80048 BASIC METABOLIC PNL TOTAL CA: CPT | Performed by: INTERNAL MEDICINE

## 2025-02-24 PROCEDURE — 80202 ASSAY OF VANCOMYCIN: CPT | Performed by: INTERNAL MEDICINE

## 2025-02-24 PROCEDURE — 85014 HEMATOCRIT: CPT | Performed by: INTERNAL MEDICINE

## 2025-02-24 PROCEDURE — 84132 ASSAY OF SERUM POTASSIUM: CPT | Performed by: INTERNAL MEDICINE

## 2025-02-24 PROCEDURE — 250N000013 HC RX MED GY IP 250 OP 250 PS 637: Performed by: INTERNAL MEDICINE

## 2025-02-24 PROCEDURE — 99232 SBSQ HOSP IP/OBS MODERATE 35: CPT | Performed by: INTERNAL MEDICINE

## 2025-02-24 PROCEDURE — 83735 ASSAY OF MAGNESIUM: CPT | Performed by: INTERNAL MEDICINE

## 2025-02-24 PROCEDURE — 84100 ASSAY OF PHOSPHORUS: CPT | Performed by: INTERNAL MEDICINE

## 2025-02-24 PROCEDURE — 36415 COLL VENOUS BLD VENIPUNCTURE: CPT | Performed by: INTERNAL MEDICINE

## 2025-02-24 RX ORDER — POTASSIUM CHLORIDE 1500 MG/1
40 TABLET, EXTENDED RELEASE ORAL ONCE
Status: COMPLETED | OUTPATIENT
Start: 2025-02-24 | End: 2025-02-24

## 2025-02-24 RX ORDER — LIDOCAINE 40 MG/G
CREAM TOPICAL
Status: DISCONTINUED | OUTPATIENT
Start: 2025-02-24 | End: 2025-02-25 | Stop reason: HOSPADM

## 2025-02-24 RX ADMIN — VANCOMYCIN HYDROCHLORIDE 1500 MG: 10 INJECTION, POWDER, LYOPHILIZED, FOR SOLUTION INTRAVENOUS at 22:10

## 2025-02-24 RX ADMIN — VALACYCLOVIR HYDROCHLORIDE 500 MG: 500 TABLET, FILM COATED ORAL at 09:50

## 2025-02-24 RX ADMIN — ACETAMINOPHEN 975 MG: 325 TABLET, FILM COATED ORAL at 01:07

## 2025-02-24 RX ADMIN — POTASSIUM & SODIUM PHOSPHATES POWDER PACK 280-160-250 MG 1 PACKET: 280-160-250 PACK at 17:39

## 2025-02-24 RX ADMIN — ACETAMINOPHEN 975 MG: 325 TABLET, FILM COATED ORAL at 17:41

## 2025-02-24 RX ADMIN — POTASSIUM & SODIUM PHOSPHATES POWDER PACK 280-160-250 MG 1 PACKET: 280-160-250 PACK at 13:23

## 2025-02-24 RX ADMIN — POTASSIUM & SODIUM PHOSPHATES POWDER PACK 280-160-250 MG 1 PACKET: 280-160-250 PACK at 20:45

## 2025-02-24 RX ADMIN — CEFTRIAXONE 2 G: 2 INJECTION, POWDER, FOR SOLUTION INTRAMUSCULAR; INTRAVENOUS at 15:05

## 2025-02-24 RX ADMIN — POTASSIUM CHLORIDE 40 MEQ: 1500 TABLET, EXTENDED RELEASE ORAL at 13:23

## 2025-02-24 RX ADMIN — ACETAMINOPHEN 975 MG: 325 TABLET, FILM COATED ORAL at 09:50

## 2025-02-24 RX ADMIN — VANCOMYCIN HYDROCHLORIDE 1500 MG: 10 INJECTION, POWDER, LYOPHILIZED, FOR SOLUTION INTRAVENOUS at 10:52

## 2025-02-24 RX ADMIN — HYDROMORPHONE HYDROCHLORIDE 2 MG: 2 TABLET ORAL at 03:58

## 2025-02-24 RX ADMIN — POTASSIUM CHLORIDE 40 MEQ: 1500 TABLET, EXTENDED RELEASE ORAL at 20:45

## 2025-02-24 RX ADMIN — TAMSULOSIN HYDROCHLORIDE 0.4 MG: 0.4 CAPSULE ORAL at 09:49

## 2025-02-24 ASSESSMENT — ACTIVITIES OF DAILY LIVING (ADL)
ADLS_ACUITY_SCORE: 35
ADLS_ACUITY_SCORE: 34
ADLS_ACUITY_SCORE: 35
ADLS_ACUITY_SCORE: 35
DEPENDENT_IADLS:: INDEPENDENT
ADLS_ACUITY_SCORE: 35
ADLS_ACUITY_SCORE: 34

## 2025-02-24 NOTE — PROGRESS NOTES
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   Continue the iv vancomycin with goal trough of 13-18  Follow up on blood cx  Planning 2 weeks therapy although this could be extended given the stent (if still in) and possibly in consideration of the tumor there    Today 2/24/2025  Clinically much improved  His BC remain negative  UC still only with Enterococcus faecalis   On iv vancomycin due to pcn allergy that sounds real (amox in 2004 with specific memory of it)    Recommendations  Continue the iv vancomycin   Stop ceftriaxone   Stent removal pushed back to Thursday this week per urology note  If stent is removed on 2/ 27 last day of therapy of vancomycin will be 3/7   OPIV antibiotics orders are in the chart   Even though patient only needs 10 more days of IV vancomycin generally PICC is used for outpatient IV vancomycin administration  Blood cultures are negative for 3 days okay to place PICC              Discussed in detail with family members sitting bedside                        History 2/22/25   65 yo man with history of newly discovered  "nephrolithiasis and recent hospitalization from 2/16 - 2/17/25 for left ureteral stent placement with laser lithotripsy with basket stone extraction of a 6 x 4 mm stone in the proximal left ureter.  During the admission, he was noted ot have a 5 cm heterogeneous mass in the L kidney worrisome for RCCA  He discharged home and noted a  new kind of l kidney pain a few days later  He then developed shaking chills and  came back to the ED on 2/20/25 with lightheadedness and L flank pain. He was discharged on oral cipro but then returned to the ED with new headache and confusion.  In the ED, CR was 1.36, lactic acid 1.9, WBC 16.1,      UA with pyuria     CT abd/pelvis showed, \"Interval placement of left double-J ureteral stent. Left-sided hydronephrosis has decreased slightly. No evidence of stone material along the stent. Perinephric and periureteral stranding and edema has increased slightly, and could be related to infection...\"    He was started on iv ceftriaxone  UC now growing Enterococcus faecalis, amp sensitivities    He is amox allergic  Vancomyicn was added  Some loose stools now      Physical Examination  BP (!) 149/78 (BP Location: Right arm)   Pulse 60   Temp 98.3  F (36.8  C) (Oral)   Resp 17   Ht 1.727 m (5' 8\")   Wt 87 kg (191 lb 11.2 oz)   SpO2 96%   BMI 29.15 kg/m    HEENT:, scleral anicteric , no scleral hemorrhages,   Op clear  Neck supple, no MARY KAY  CV: RRR   Lungs   diminished bases  Abd soft, mild diffuse tender,   Back + L CVAT, now mild  Ext; no c/c/e, no splinter hemorrhages or nodes    Current Facility-Administered Medications   Medication Dose Route Frequency Provider Last Rate Last Admin    acetaminophen (TYLENOL) tablet 975 mg  975 mg Oral Q8H Chencho Alston MD   975 mg at 02/24/25 1741    albuterol (PROVENTIL) neb solution 2.5 mg  2.5 mg Nebulization Q2H PRN Chencho Alston MD   2.5 mg at 02/22/25 1250    calcium carbonate (TUMS) chewable tablet 1,000 mg  1,000 mg Oral 4x Daily PRN " Humberto Fam DO   1,000 mg at 02/23/25 1419    cefTRIAXone (ROCEPHIN) 2 g vial to attach to  ml bag for ADULTS or NS 50 ml bag for PEDS  2 g Intravenous Q24H Humberto Fam DO   2 g at 02/24/25 1505    HYDROmorphone (DILAUDID) tablet 2 mg  2 mg Oral Q4H PRN Harshil Douglas MD   2 mg at 02/24/25 0358    HYDROmorphone (PF) (DILAUDID) injection 0.5 mg  0.5 mg Intravenous Q2H PRN Chencho Alston MD        lidocaine (LMX4) cream   Topical Q1H PRN Humberto Fam DO        lidocaine 1 % 0.1-1 mL  0.1-1 mL Other Q1H PRN Humberto Fam DO        naloxone (NARCAN) injection 0.2 mg  0.2 mg Intravenous Q2 Min PRN Humberto Fam DO        Or    naloxone (NARCAN) injection 0.4 mg  0.4 mg Intravenous Q2 Min PRN Humberto Fam DO        Or    naloxone (NARCAN) injection 0.2 mg  0.2 mg Intramuscular Q2 Min PRN Humberto Fam DO        Or    naloxone (NARCAN) injection 0.4 mg  0.4 mg Intramuscular Q2 Min PRN Humberto Fam DO        ondansetron (ZOFRAN ODT) ODT tab 4 mg  4 mg Oral Q6H PRN Humberto Fam DO   4 mg at 02/21/25 1806    Or    ondansetron (ZOFRAN) injection 4 mg  4 mg Intravenous Q6H PRN Humberto Fam DO   4 mg at 02/20/25 2058    oxyBUTYnin (DITROPAN) tablet 5 mg  5 mg Oral TID PRN Humberto Fam DO   5 mg at 02/21/25 0613    polyethylene glycol (MIRALAX) Packet 17 g  17 g Oral BID PRN Humberto Fam DO   17 g at 02/21/25 0855    potassium & sodium phosphates (NEUTRA-PHOS) Packet 1 packet  1 packet Oral or Feeding Tube Q4H Chencho Alston MD   1 packet at 02/24/25 1739    prochlorperazine (COMPAZINE) injection 10 mg  10 mg Intravenous Q6H PRN Chencho Alston MD   10 mg at 02/21/25 1957    Or    prochlorperazine (COMPAZINE) tablet 10 mg  10 mg Oral Q6H PRN Chencho Alston MD        senna-docusate (SENOKOT-S/PERICOLACE) 8.6-50 MG per tablet 1 tablet  1 tablet Oral BID PRN Humberto Fam,         Or     "senna-docusate (SENOKOT-S/PERICOLACE) 8.6-50 MG per tablet 2 tablet  2 tablet Oral BID PRN Humberto Fam DO        sodium chloride (PF) 0.9% PF flush 3 mL  3 mL Intracatheter Q8H Humberto Fam DO   3 mL at 02/23/25 2017    sodium chloride (PF) 0.9% PF flush 3 mL  3 mL Intracatheter q1 min prn Humberto Fam DO        tamsulosin (FLOMAX) capsule 0.4 mg  0.4 mg Oral Daily Humberto Fam DO   0.4 mg at 02/24/25 0949    valACYclovir (VALTREX) tablet 500 mg  500 mg Oral Daily Humberto Fam DO   500 mg at 02/24/25 0950    vancomycin (VANCOCIN) 1,500 mg in 0.9% NaCl 265 mL intermittent infusion  1,500 mg Intravenous Q12H Chencho Alston MD   1,500 mg at 02/24/25 1052         LABORATORY DATA  Lab Results   Component Value Date    WBC 9.4 02/22/2025    WBC 5.2 03/22/2011     Lab Results   Component Value Date    RBC 3.66 02/22/2025    RBC 4.71 03/22/2011     Lab Results   Component Value Date    HGB 11.9 02/22/2025    HGB 15.4 03/22/2011     Lab Results   Component Value Date    HCT 34.9 02/22/2025    HCT 43.9 03/22/2011     No components found for: \"MCT\"  Lab Results   Component Value Date    MCV 95 02/22/2025    MCV 93 03/22/2011     Lab Results   Component Value Date    MCH 32.5 02/22/2025    MCH 32.7 03/22/2011     Lab Results   Component Value Date    MCHC 34.1 02/22/2025    MCHC 35.1 03/22/2011     Lab Results   Component Value Date    RDW 12.0 02/22/2025    RDW 12.2 03/22/2011     Lab Results   Component Value Date     02/22/2025     03/22/2011     Last Comprehensive Metabolic Panel:  Sodium   Date Value Ref Range Status   02/24/2025 140 135 - 145 mmol/L Final   12/10/2024 140.3 135 - 146 mmol/L Final     Potassium   Date Value Ref Range Status   02/24/2025 3.2 (L) 3.4 - 5.3 mmol/L Final   12/10/2024 4.18 3.5 - 5.3 mmol/L Final     Chloride   Date Value Ref Range Status   02/24/2025 107 98 - 107 mmol/L Final   12/10/2024 106.5 98 - 110 mmol/L Final     Carbon Dioxide "   Date Value Ref Range Status   12/10/2024 25.0 20 - 32 mmol/L Final     Carbon Dioxide (CO2)   Date Value Ref Range Status   02/24/2025 21 (L) 22 - 29 mmol/L Final     Anion Gap   Date Value Ref Range Status   02/24/2025 12 7 - 15 mmol/L Final   03/22/2011 8 6 - 17 mmol/L Final     Glucose   Date Value Ref Range Status   02/24/2025 98 70 - 99 mg/dL Final   12/10/2024 94 60 - 99 mg/dL Final     Urea Nitrogen   Date Value Ref Range Status   02/24/2025 12.2 8.0 - 23.0 mg/dL Final   12/10/2024 22 7 - 25 mg/dL Final     BUN/Creatinine Ratio   Date Value Ref Range Status   12/10/2024 20 6 - 32 Final     Creatinine   Date Value Ref Range Status   02/24/2025 1.04 0.67 - 1.17 mg/dL Final   12/10/2024 1.10 0.60 - 1.30 mg/dL Final     GFR Estimate   Date Value Ref Range Status   02/24/2025 81 >60 mL/min/1.73m2 Final     Comment:     eGFR calculated using 2021 CKD-EPI equation.   02/22/2018 74 > OR = 60 mL/min/1.73m2 Final     Calcium   Date Value Ref Range Status   02/24/2025 8.0 (L) 8.8 - 10.4 mg/dL Final   12/10/2024 9.5 8.6 - 10.3 mg/dL Final     Bilirubin Total   Date Value Ref Range Status   02/20/2025 1.7 (H) <=1.2 mg/dL Final   12/10/2024 1.5 (A) 0.2 - 1.2 mg/dL Final     Alkaline Phosphatase   Date Value Ref Range Status   02/20/2025 79 40 - 150 U/L Final   12/10/2024 63 33 - 130 U/L Final     ALT   Date Value Ref Range Status   02/20/2025 26 0 - 70 U/L Final   02/22/2018 62 (H) 9 - 46 U/L Final     AST   Date Value Ref Range Status   02/20/2025 28 0 - 45 U/L Final   02/22/2018 188 (H) 10 - 35 U/L Final       MICROBIOLOGY    2/20/25 BC x 1 NGTD    2/20/25 UC >100,000 CFU/mL Enterococcus faecalis Abnormal      RADIOLOGY  EXAM: CT ABDOMEN PELVIS W CONTRAST  LOCATION: [facility]  DATE: 2/20/2025     INDICATION: Left ureteral stent, rigors.     COMPARISON: 2/16/2025.     TECHNIQUE: CT scan of the abdomen and pelvis was performed following injection of intravenous contrast. Multiplanar reformats were  obtained. Dose reduction techniques were used.  CONTRAST: 100 mL Isovue 370.  FINDINGS:   LOWER CHEST: Atelectasis is present in both bases.  HEPATOBILIARY: Normal.  PANCREAS: Normal.  SPLEEN: Borderline splenomegaly at 13.1 cm. No focal lesions.  ADRENAL GLANDS: Normal.  KIDNEYS/BLADDER: There has been interval placement of a left double-J ureteral stent. Left-sided hydronephrosis has decreased slightly. No evidence of stone material along the stent. Perinephric and periureteral stranding is slightly increased from the   previous study. Punctate nonobstructing stone in the lower pole left kidney on image 94 of series 3. Otherwise, no evidence for renal or ureteral calculi through either kidney. Heterogeneous mass arising from the posterior mid to lower left kidney is   again noted and concerning for renal cell carcinoma. Tiny amount of nondependent gas is seen within the bladder. No evidence for bladder mass.  BOWEL: No colonic wall thickening or inflammatory change.  LYMPH NODES: Normal.  VASCULATURE: Normal.  PELVIC ORGANS: No free fluid or adenopathy through the pelvis.  MUSCULOSKELETAL: Mild degenerative changes are noted through the spine.                                                          IMPRESSION:   1.  Interval placement of left double-J ureteral stent. Left-sided hydronephrosis has decreased slightly. No evidence of stone material along the stent. Perinephric and periureteral stranding and edema has increased slightly, and could be related to   infection.  2.  Punctate nonobstructing stone in the lower pole left kidney.  3.  Stable heterogeneous mass arising from the posterior mid to lower left kidney concerning for renal cell carcinoma.

## 2025-02-24 NOTE — CONSULTS
Care Management Initial Consult    General Information  Assessment completed with: Patient, Martín  Type of CM/SW Visit: Initial Assessment    Primary Care Provider verified and updated as needed: Yes   Readmission within the last 30 days: no previous admission in last 30 days, previous discharge plan unsuccessful      Reason for Consult: discharge planning  Advance Care Planning:            Communication Assessment  Patient's communication style: spoken language (English or Bilingual)    Hearing Difficulty or Deaf: no   Wear Glasses or Blind: no    Cognitive  Cognitive/Neuro/Behavioral: WDL (discussed episode of confusion overnight)  Level of Consciousness: alert, other (see comments) (pt reports brain fog)  Arousal Level: opens eyes spontaneously  Orientation: oriented x 4     Best Language: 0 - No aphasia  Speech: clear    Living Environment:   People in home: spouse     Current living Arrangements: house      Able to return to prior arrangements: yes       Family/Social Support:  Care provided by: self  Provides care for: no one  Marital Status:   Support system: Wife, Children          Description of Support System: Involved, Supportive    Support Assessment: Adequate social supports    Current Resources:   Patient receiving home care services: No        Community Resources: None  Equipment currently used at home: none  Supplies currently used at home: None    Employment/Financial:  Employment Status:          Financial Concerns:             Does the patient's insurance plan have a 3 day qualifying hospital stay waiver?  No    Lifestyle & Psychosocial Needs:  Social Drivers of Health     Food Insecurity: Low Risk  (2/20/2025)    Food Insecurity     Within the past 12 months, did you worry that your food would run out before you got money to buy more?: No     Within the past 12 months, did the food you bought just not last and you didn t have money to get more?: No   Depression: Not at risk (12/10/2024)     PHQ-2     PHQ-2 Score: 0   Housing Stability: Low Risk  (2/20/2025)    Housing Stability     Do you have housing? : Yes     Are you worried about losing your housing?: No   Tobacco Use: Medium Risk (12/10/2024)    Patient History     Smoking Tobacco Use: Former     Smokeless Tobacco Use: Never     Passive Exposure: Past   Financial Resource Strain: Low Risk  (2/20/2025)    Financial Resource Strain     Within the past 12 months, have you or your family members you live with been unable to get utilities (heat, electricity) when it was really needed?: No   Alcohol Use: Not on file   Transportation Needs: Low Risk  (2/20/2025)    Transportation Needs     Within the past 12 months, has lack of transportation kept you from medical appointments, getting your medicines, non-medical meetings or appointments, work, or from getting things that you need?: No   Physical Activity: Not on file   Interpersonal Safety: Low Risk  (2/20/2025)    Interpersonal Safety     Do you feel physically and emotionally safe where you currently live?: Yes     Within the past 12 months, have you been hit, slapped, kicked or otherwise physically hurt by someone?: No     Within the past 12 months, have you been humiliated or emotionally abused in other ways by your partner or ex-partner?: No   Stress: Not on file   Social Connections: Not on file   Health Literacy: Not on file       Functional Status:  Prior to admission patient needed assistance:   Dependent ADLs:: Ambulation-no assistive device, Independent  Dependent IADLs:: Independent  Assesssment of Functional Status: At functional baseline    Mental Health Status:          Chemical Dependency Status:                Values/Beliefs:  Spiritual, Cultural Beliefs, Christian Practices, Values that affect care:                 Discussed  Partnership in Safe Discharge Planning  document with patient/family: No    Additional Information:  CM met with patient, wife and daughter to discuss Home  Infusion.Patient will need 10-14 days of IV antibiotics. Care Management has been consulted for Home infusion needs. Per chart review, patient will be discharging home with needs for home IV antibiotics.. Met with patient at bedside to discuss home infusion recommendations and referral process. Initial referral was sent to [facility] for benefit check and cost estimate. Current plan of care indicates patient will need Vanco 68722 mg Q 12 hours and possible Ceftriaxone 2 gms Q 24 hours.. [facility] Liaison will contact patient to introduce home infusion, review benefits and offer choice. RN CC will cont to follow for discharge needs and work with [facility] Liaison for Home Infusion arrangements. Patient will need discharge order for Home Infusion Referral placed by MD on discharge.   CM met with patient and completed assessment. Patient lives at home with his wife in a house. She is a teachable party. Daughter, Natasha, is an RN as well. Patient was independent in all cares , tasks and ambulation prior to admission.Transportation home via his wife.    Next Steps: Monitor for return of benefit check.  Patient was informed the [facility] Liaison would be calling tomorrow.    Aditi Rodríguez, RN, BSN, CM  Inpatient Care Coordination  [facility]  [phone]

## 2025-02-24 NOTE — PROGRESS NOTES
[facility] Urology Progress Note          Assessment and Plan:     Assessment:     POD 8  Cystourethroscopy Left ureteroscopy Left retrograde pyelogram with interpretation of intraoperative fluoroscopic imaging Holmium laser lithotripsy with basket stone extraction Left ureteral stent placement     Infection associated with indwelling ureteral stent, initial encounter    Left renal mass >5 cm    Left sided pyelonephritis    ANNALEE      Plan:   -Continue with indwelling ureteral stent.  This was originally scheduled to be removed in clinic later today, but I suspect patient will not be able to make this appointment.  Have pushed back to Thursday of this week.  -Continue with IV antibiotics.  Appreciate ID assistance.  Suspect that patient will need to complete a total of 10 to 14 days of IV vancomycin.  Will need coordination for outpatient IV antibiotics.  -ANNALEE has resolved.  Leukocytosis has also resolved.  -Possible side effects with an indwelling ureteral stent such as urgency and frequency of urination, dysuria, hematuria, symptoms of urine reflux, and some achiness in the side. Indwelling ureteral stents need to be exchanged every three months or removed by three months.    -Will eventually need partial nephrectomy for his left renal mass, but we do need to have the pyelonephritis and inflammation resolved prior to this.   -Flomax 0.4 mg once daily.  -Okay to discharge from a urology perspective, but suspect patient will be here another day.  Will continue to follow along.    Neha Mullins PA-C   [facility]rology  [phone]               Interval History:     Doing okay.  Feels better than last week, but had fever, chills, and diarrhea overnight.  Urine culture showed greater than 100,000 CFU per mL of Enterococcus faecalis.  Patient on IV vancomycin and IV Rocephin.  Creatinine 1.04 EGFR of 81.  WBC 6.0 (7.0).              Review of Systems:     The 5 point Review of Systems is negative  other than noted in the HPI             Medications:     Current Facility-Administered Medications   Medication Dose Route Frequency Provider Last Rate Last Admin    acetaminophen (TYLENOL) tablet 975 mg  975 mg Oral Q8H Chencho Alston MD   975 mg at 02/24/25 0950    albuterol (PROVENTIL) neb solution 2.5 mg  2.5 mg Nebulization Q2H PRN Chencho Alston MD   2.5 mg at 02/22/25 1250    calcium carbonate (TUMS) chewable tablet 1,000 mg  1,000 mg Oral 4x Daily PRN Humberto Fam DO   1,000 mg at 02/23/25 1419    cefTRIAXone (ROCEPHIN) 2 g vial to attach to  ml bag for ADULTS or NS 50 ml bag for PEDS  2 g Intravenous Q24H Humberto Fam DO   2 g at 02/23/25 1532    HYDROmorphone (DILAUDID) tablet 2 mg  2 mg Oral Q4H PRN Harshil Douglas MD   2 mg at 02/24/25 0358    HYDROmorphone (PF) (DILAUDID) injection 0.5 mg  0.5 mg Intravenous Q2H PRN Chencho Alston MD        lidocaine (LMX4) cream   Topical Q1H PRN Humberto Fam DO        lidocaine 1 % 0.1-1 mL  0.1-1 mL Other Q1H PRN Humberto Fam DO        naloxone (NARCAN) injection 0.2 mg  0.2 mg Intravenous Q2 Min PRN Humberto Fam DO        Or    naloxone (NARCAN) injection 0.4 mg  0.4 mg Intravenous Q2 Min PRN Humberto Fam DO        Or    naloxone (NARCAN) injection 0.2 mg  0.2 mg Intramuscular Q2 Min PRN Humberto Fam DO        Or    naloxone (NARCAN) injection 0.4 mg  0.4 mg Intramuscular Q2 Min PRN Humberto Fam DO        ondansetron (ZOFRAN ODT) ODT tab 4 mg  4 mg Oral Q6H PRN Humberto Fam DO   4 mg at 02/21/25 1806    Or    ondansetron (ZOFRAN) injection 4 mg  4 mg Intravenous Q6H PRN Humberto Fam DO   4 mg at 02/20/25 2058    oxyBUTYnin (DITROPAN) tablet 5 mg  5 mg Oral TID PRN Humberto Fam DO   5 mg at 02/21/25 0613    polyethylene glycol (MIRALAX) Packet 17 g  17 g Oral BID PRN Humberto Fam DO   17 g at 02/21/25 0855    prochlorperazine (COMPAZINE)  "injection 10 mg  10 mg Intravenous Q6H PRN Chencho Alston MD   10 mg at 02/21/25 1957    Or    prochlorperazine (COMPAZINE) tablet 10 mg  10 mg Oral Q6H PRN Chencho Alston MD        senna-docusate (SENOKOT-S/PERICOLACE) 8.6-50 MG per tablet 1 tablet  1 tablet Oral BID PRN Humberto Fam DO        Or    senna-docusate (SENOKOT-S/PERICOLACE) 8.6-50 MG per tablet 2 tablet  2 tablet Oral BID PRN Humberto Fam DO        sodium chloride (PF) 0.9% PF flush 3 mL  3 mL Intracatheter Q8H Humberto Fam DO   3 mL at 02/23/25 2017    sodium chloride (PF) 0.9% PF flush 3 mL  3 mL Intracatheter q1 min prn Humberto Fam DO        tamsulosin (FLOMAX) capsule 0.4 mg  0.4 mg Oral Daily Humberto Fam DO   0.4 mg at 02/24/25 0949    valACYclovir (VALTREX) tablet 500 mg  500 mg Oral Daily Humberto Fam DO   500 mg at 02/24/25 0950    vancomycin (VANCOCIN) 1,500 mg in 0.9% NaCl 265 mL intermittent infusion  1,500 mg Intravenous Q12H Chencho Alston MD                      Physical Exam:   Vitals were reviewed  Patient Vitals for the past 8 hrs:   BP Temp Temp src Pulse Resp SpO2   02/24/25 0717 (!) 142/88 98.2  F (36.8  C) Oral 57 20 95 %   02/24/25 0353 (!) 149/85 97.5  F (36.4  C) Oral 64 20 97 %     GEN: NAD, lying in bed  EYES: EOMI  MOUTH: MMM  NECK: Supple  RESP: Unlabored breathing  NEURO: AAO  URO: Urinating on own           Data:   No results found for: \"NTBNPI\", \"NTBNP\"  Lab Results   Component Value Date    WBC 6.8 02/24/2025    WBC 7.0 02/23/2025    WBC 9.4 02/22/2025    HGB 12.4 (L) 02/24/2025    HGB 11.5 (L) 02/23/2025    HGB 11.9 (L) 02/22/2025    HCT 34.5 (L) 02/24/2025    HCT 32.7 (L) 02/23/2025    HCT 34.9 (L) 02/22/2025    MCV 91 02/24/2025    MCV 93 02/23/2025    MCV 95 02/22/2025     02/24/2025     (L) 02/23/2025     (L) 02/22/2025     "

## 2025-02-24 NOTE — PHARMACY-VANCOMYCIN DOSING SERVICE
Pharmacy Vancomycin Note  Date of Service 2025  Patient's  1960   64 year old, male    Indication: Sepsis and Urinary Tract Infection  Day of Therapy: 4  Current vancomycin regimen:  1500 mg IV q24h  Current vancomycin monitoring method: AUC  Current vancomycin therapeutic monitoring goal: 400-600 mg*h/L    Insight[employer] Prediction of Current Vancomycin Regimen    Loading dose: N/A  Regimen: 1500 mg IV every 24 hours.  Start time: 11:40 on 2025  Exposure target: AUC24 (range)400-600 mg/L.hr   AUC24,ss: 264 mg/L.hr  Probability of AUC24 > 400: 0 %  Ctrough,ss: 4.3 mg/L  Probability of Ctrough,ss > 20: 0 %  Probability of nephrotoxicity (Lodise BALDEMAR ): 3 %    Current estimated CrCl = Estimated Creatinine Clearance: 78.9 mL/min (based on SCr of 1.04 mg/dL).    Creatinine for last 3 days  2025:  4:33 PM Creatinine 1.41 mg/dL  2025:  6:19 AM Creatinine 1.38 mg/dL  2025:  6:17 AM Creatinine 1.13 mg/dL  2025:  7:04 AM Creatinine 1.04 mg/dL    Recent Vancomycin Levels (past 3 days)  2025:  7:04 AM Vancomycin 5.2 ug/mL    Vancomycin IV Administrations (past 72 hours)                     vancomycin (VANCOCIN) 1,500 mg in 0.9% NaCl 265 mL intermittent infusion (mg) 1,500 mg New Bag 25 1140     1,500 mg New Bag 25 1155    vancomycin (VANCOCIN) 1,750 mg in 0.9% NaCl 517.5 mL intermittent infusion (mg) 1,750 mg New Bag 25 1900                    Nephrotoxins and other renal medications (From now, onward)      Start     Dose/Rate Route Frequency Ordered Stop    25 1000  vancomycin (VANCOCIN) 1,500 mg in 0.9% NaCl 265 mL intermittent infusion         1,500 mg  over 90 Minutes Intravenous EVERY 12 HOURS 25 0822      25 2000  valACYclovir (VALTREX) tablet 500 mg        Note to Pharmacy: PTA Sig:Take 1 tablet (500 mg) by mouth daily.      500 mg Oral DAILY 25                 Contrast Orders - past 72 hours (72h ago, onward)      None             Interpretation of levels and current regimen:  Vancomycin level is reflective of AUC less than 400    Has serum creatinine changed greater than 50% in last 72 hours: No    Urine output:  unable to determine    Renal Function: Improving    InsightRX Prediction of Planned New Vancomycin Regimen    Loading dose: N/A  Regimen: 1500 mg IV every 12 hours.  Start time: 11:40 on 02/24/2025  Exposure target: AUC24 (range)400-600 mg/L.hr   AUC24,ss: 526 mg/L.hr  Probability of AUC24 > 400: 96 %  Ctrough,ss: 13 mg/L  Probability of Ctrough,ss > 20: 1 %  Probability of nephrotoxicity (Lodise BALDEMAR 2009): 8 %    Plan:  Increase Dose to 1500mg q12h  Vancomycin monitoring method: AUC  Vancomycin therapeutic monitoring goal: 400-600 mg*h/L  Pharmacy will check vancomycin levels as appropriate in 1-3 Days.  Serum creatinine levels will be ordered daily for the first week of therapy and at least twice weekly for subsequent weeks.    Silvio Graham formerly [facility]

## 2025-02-24 NOTE — PROGRESS NOTES
"[facility]    Medicine Progress Note - Hospitalist Service    Date of Admission:  2/20/2025    Assessment & Plan   Benedict De Anda is a 64 year old male who came to attention on 2/20/2025 due to concern for new and rather abrupt onset of confusion and headache.      His recent medical history includes hospitalization from 2/16-2/17 left ureteral stent placement with laser lithotripsy with basket stone extraction for management of a 6 x 4 mm calculus in the proximal left ureter.  He was also incidentally noted to have a 5 cm heterogeneously enhancing mass in the left kidney compatible with renal cell carcinoma.  The patient then returned to the emergency department on 2/20/2025 early in the morning with concern for flank pain and dizziness at which point he reported fever and dysuria.  Urology was contacted but patient was discharged home with ciprofloxacin.  It was in this setting that the patient had return to the emergency department with confusion and headache.    PMH includes nephrolithiasis.    On presentation to the emergency department, VS: /64 came down to 104/53 over the course of about 1/2-hour.  HR 91, RR 16 with oxygen saturation 95% breathing room air.  Temperature 98.6.  Exam: Patient was described as \"fatigued but nontoxic\"-appearing.  Labs: Creatinine 1.36 with normal electrolytes.  Total bilirubin 1.7.  Glucose 120.  Lactic acid 1.9.  VBG pH 7.44 with pCO2 36.  WBC 16.1, Hgb 14.2, .  Urinalysis from earlier in the day was strongly positive and had been sent for urine culture.  Blood cultures were also obtained in the ED at the time of admission.  Imaging: CT abdomen and pelvis with contrast shows \"Interval placement of left double-J ureteral stent. Left-sided hydronephrosis has decreased slightly. No evidence of stone material along the stent. Perinephric and periureteral stranding and edema has increased slightly, and could be related to   infection...\"   " "Adilson was started on Ceftriaxone 2 g IV q 24 hours and NS at 100 ml/hr.    Since admission, Mr. De Anda has done well. He is now more comfortable, though he did have an episode of drenching sweats at night.     DX:  Sepsis with mild acute kidney injury, mild infectious encephalopathy, elevated white cell count and fevers.  Improving/stable. He has grown an Enterococcus from the urine, but I am concerned that he may have had a GNR that we have been unable to culture due to his exposure to antibiotics.    Complicated urinary tract infection status post left ureteral instrumentation and lithotripsy.  Acute kidney injury.  Baseline creatinine 1.1.  Very mild acute encephalopathy. Although this is mentioned by family it would be compatible with the moderate sepsis syndrome (i.e. infectious encephalopathy), it will be managed by treating the UTI.  GIA/MDD.  SOB with mild hypoxia due to iatrogenic volume overload.   Diarrhea likely related abx.  Infectious work up negative.  PLAN:  Start Vanco to cover for the Enterococcus. Pt describes significant PCN allergy.  Continues on Ceftriaxone until cultures are otherwise resulted.   Strict I & O.  Repeat dose of Lasix this afternoon.      Diet: Combination Diet Regular Diet Adult    DVT Prophylaxis: Pneumatic Compression Devices  Lopez Catheter: Not present  Lines: None     Cardiac Monitoring: None  Code Status: Full Code      Clinically Significant Risk Factors        # Hypokalemia: Lowest K = 3.2 mmol/L in last 2 days, will replace as needed   # Hyperchloremia: Highest Cl = 108 mmol/L in last 2 days, will monitor as appropriate            # Thrombocytopenia: Lowest platelets = 124 in last 2 days, will monitor for bleeding              # Overweight: Estimated body mass index is 29.88 kg/m  as calculated from the following:    Height as of this encounter: 1.727 m (5' 8\").    Weight as of this encounter: 89.1 kg (196 lb 8 oz)., PRESENT ON ADMISSION            Social Drivers of " Health    Tobacco Use: Medium Risk (12/10/2024)    Patient History     Smoking Tobacco Use: Former     Smokeless Tobacco Use: Never     Passive Exposure: Past          Disposition Plan     Medically Ready for Discharge: Anticipated Tomorrow        Chencho Alston MD  Hospitalist Service  [facility]  Securely message with [employer] (more info)  Text page via [employer] Paging/Directory   ______________________________________________________________________    Interval History   Stable night per nursing documentation.     Pt reports other concerns overnight. An episode of diarrhea and incontinece overnight with apparent confusion and shaking chills. Now feeling generally improved.     Physical Exam   Vital Signs: Temp: 98.2  F (36.8  C) Temp src: Oral BP: (!) 142/88 Pulse: 57   Resp: 20 SpO2: 95 % O2 Device: None (Room air)    Weight: 196 lbs 8 oz    General Appearance: Alert and coherent. Family feels he is confused though this appears fully resolved at this time.   Respiratory:  Good air entry throughout, scant end expiratory wheeze. No rales  Cardiovascular: RRR without murmur  GI: distention resolved, non-tender, no mass appreciable.  Skin: no lesions.  Other:      Medical Decision Making       40 MINUTES SPENT BY ME on the date of service doing chart review, history, exam, documentation & further activities per the note.      Data   Results for orders placed or performed during the hospital encounter of 02/20/25 (from the past 24 hours)   Basic metabolic panel   Result Value Ref Range    Sodium 140 135 - 145 mmol/L    Potassium 3.2 (L) 3.4 - 5.3 mmol/L    Chloride 107 98 - 107 mmol/L    Carbon Dioxide (CO2) 21 (L) 22 - 29 mmol/L    Anion Gap 12 7 - 15 mmol/L    Urea Nitrogen 12.2 8.0 - 23.0 mg/dL    Creatinine 1.04 0.67 - 1.17 mg/dL    GFR Estimate 81 >60 mL/min/1.73m2    Calcium 8.0 (L) 8.8 - 10.4 mg/dL    Glucose 98 70 - 99 mg/dL   CBC with platelets   Result Value Ref Range    WBC Count 6.8 4.0  - 11.0 10e3/uL    RBC Count 3.79 (L) 4.40 - 5.90 10e6/uL    Hemoglobin 12.4 (L) 13.3 - 17.7 g/dL    Hematocrit 34.5 (L) 40.0 - 53.0 %    MCV 91 78 - 100 fL    MCH 32.7 26.5 - 33.0 pg    MCHC 35.9 31.5 - 36.5 g/dL    RDW 12.1 10.0 - 15.0 %    Platelet Count 158 150 - 450 10e3/uL   Magnesium   Result Value Ref Range    Magnesium 1.8 1.7 - 2.3 mg/dL   Phosphorus   Result Value Ref Range    Phosphorus 2.3 (L) 2.5 - 4.5 mg/dL   Vancomycin level   Result Value Ref Range    Vancomycin 5.2   ug/mL

## 2025-02-24 NOTE — PLAN OF CARE
"Activity: SBA  Diet/BS Checks: regular  IV Access/Drains: PIV SL  Pain Management: scheduled tylenol,oral dilaudid  Abnormal VS/Results: VSS on RA  Bowel/Bladder: loose stools  Other Info:   -dyspnea on exertion      Problem: Adult Inpatient Plan of Care  Goal: Plan of Care Review  Description: The Plan of Care Review/Shift note should be completed every shift.  The Outcome Evaluation is a brief statement about your assessment that the patient is improving, declining, or no change.  This information will be displayed automatically on your shift  note.  Outcome: Not Progressing  Goal: Patient-Specific Goal (Individualized)  Description: You can add care plan individualizations to a care plan. Examples of Individualization might be:  \"Parent requests to be called daily at 9am for status\", \"I have a hard time hearing out of my right ear\", or \"Do not touch me to wake me up as it startles  me\".  Outcome: Not Progressing  Goal: Absence of Hospital-Acquired Illness or Injury  Outcome: Not Progressing  Intervention: Identify and Manage Fall Risk  Recent Flowsheet Documentation  Taken 2/23/2025 2025 by Cassie Whalen RN  Safety Promotion/Fall Prevention:   activity supervised   assistive device/personal items within reach   clutter free environment maintained   lighting adjusted   patient and family education   nonskid shoes/slippers when out of bed   safety round/check completed  Intervention: Prevent Skin Injury  Recent Flowsheet Documentation  Taken 2/23/2025 2025 by Cassie Whalen RN  Body Position: position changed independently  Intervention: Prevent Infection  Recent Flowsheet Documentation  Taken 2/23/2025 2025 by Cassie Whalen RN  Infection Prevention:   cohorting utilized   environmental surveillance performed   equipment surfaces disinfected   hand hygiene promoted   rest/sleep promoted   single patient room provided  Goal: Optimal Comfort and Wellbeing  Outcome: Not Progressing  Goal: Readiness for " Transition of Care  Outcome: Not Progressing     Problem: UTI (Urinary Tract Infection)  Goal: Improved Infection Symptoms  Outcome: Not Progressing     Problem: Skin Injury Risk Increased  Goal: Skin Health and Integrity  Outcome: Not Progressing  Intervention: Plan: Nurse Driven Intervention: Moisture Management  Recent Flowsheet Documentation  Taken 2/23/2025 2025 by Cassie Whalen, RN  Moisture Interventions:   Encourage regular toileting   Incontinence pad  Intervention: Optimize Skin Protection  Recent Flowsheet Documentation  Taken 2/23/2025 2025 by Cassie Whalen, RN  Activity Management: activity adjusted per tolerance  Head of Bed (HOB) Positioning: HOB at 20-30 degrees

## 2025-02-24 NOTE — PROGRESS NOTES
"[facility]  INFECTIOUS DISEASES CONSULTATION  Benedict De Anda 2/22/2025    Today 2/23/2025  Feeling much improved  No fever  Less L sided flank pain  No n/v/d    History 2/22/25   65 yo man with history of newly discovered nephrolithiasis and recent hospitalization from 2/16 - 2/17/25 for left ureteral stent placement with laser lithotripsy with basket stone extraction of a 6 x 4 mm stone in the proximal left ureter.  During the admission, he was noted ot have a 5 cm heterogeneous mass in the L kidney worrisome for RCCA  He discharged home and noted a  new kind of l kidney pain a few days later  He then developed shaking chills and  came back to the ED on 2/20/25 with lightheadedness and L flank pain. He was discharged on oral cipro but then returned to the ED with new headache and confusion.  In the ED, CR was 1.36, lactic acid 1.9, WBC 16.1,    UA with pyuria   CT abd/pelvis showed, \"Interval placement of left double-J ureteral stent. Left-sided hydronephrosis has decreased slightly. No evidence of stone material along the stent. Perinephric and periureteral stranding and edema has increased slightly, and could be related to infection...\"  He was started on iv ceftriaxone  UC now growing Enterococcus faecalis, amp sens  He is amox allergic  Vancomyicn was added  Some loose stools now      Physical Examination  /82   Pulse 65   Temp 98.8  F (37.1  C) (Oral)   Resp 20   Ht 1.727 m (5' 8\")   Wt 89.1 kg (196 lb 8 oz)   SpO2 95%   BMI 29.88 kg/m    HEENT:, scleral anicteric , no scleral hemorrhages,   Op clear  Neck supple, no MARY KAY  CV: RRR   Lungs   diminished bases  Abd soft, mild diffuse tender,   Back + L CVAT, now mild  Ext; no c/c/e, no splinter hemorrhages or nodes    Current Facility-Administered Medications   Medication Dose Route Frequency Provider Last Rate Last Admin    acetaminophen (TYLENOL) tablet 975 mg  975 mg Oral Q8H Chencho Alston MD   975 mg at 02/23/25 1029 "    albuterol (PROVENTIL) neb solution 2.5 mg  2.5 mg Nebulization Q2H PRN Chencho Alston MD   2.5 mg at 02/22/25 1250    calcium carbonate (TUMS) chewable tablet 1,000 mg  1,000 mg Oral 4x Daily PRN Humberto Fam DO   1,000 mg at 02/23/25 1419    cefTRIAXone (ROCEPHIN) 2 g vial to attach to  ml bag for ADULTS or NS 50 ml bag for PEDS  2 g Intravenous Q24H Humberto Fam DO   2 g at 02/23/25 1532    HYDROmorphone (DILAUDID) tablet 2 mg  2 mg Oral Q4H PRN Harshil Douglas MD   2 mg at 02/23/25 1534    HYDROmorphone (PF) (DILAUDID) injection 0.5 mg  0.5 mg Intravenous Q2H PRN Chencho Alston MD        lidocaine (LMX4) cream   Topical Q1H PRN Humberto Fam DO        lidocaine 1 % 0.1-1 mL  0.1-1 mL Other Q1H PRN Humberto Fam DO        naloxone (NARCAN) injection 0.2 mg  0.2 mg Intravenous Q2 Min PRN Humberto Fam DO        Or    naloxone (NARCAN) injection 0.4 mg  0.4 mg Intravenous Q2 Min PRN Humberto Fam DO        Or    naloxone (NARCAN) injection 0.2 mg  0.2 mg Intramuscular Q2 Min PRN Humberto Fam DO        Or    naloxone (NARCAN) injection 0.4 mg  0.4 mg Intramuscular Q2 Min PRN Humberto Fam DO        ondansetron (ZOFRAN ODT) ODT tab 4 mg  4 mg Oral Q6H PRN Humberto Fam DO   4 mg at 02/21/25 1806    Or    ondansetron (ZOFRAN) injection 4 mg  4 mg Intravenous Q6H PRN Humberto Fam DO   4 mg at 02/20/25 2058    oxyBUTYnin (DITROPAN) tablet 5 mg  5 mg Oral TID PRN Humberto Fam DO   5 mg at 02/21/25 0613    polyethylene glycol (MIRALAX) Packet 17 g  17 g Oral BID PRN Humberto Fam DO   17 g at 02/21/25 0855    prochlorperazine (COMPAZINE) injection 10 mg  10 mg Intravenous Q6H PRN Chencho Alston MD   10 mg at 02/21/25 1957    Or    prochlorperazine (COMPAZINE) tablet 10 mg  10 mg Oral Q6H PRN Chencho Alston MD        senna-docusate (SENOKOT-S/PERICOLACE) 8.6-50 MG per tablet 1 tablet  1 tablet Oral  "BID PRN Humberto Fam DO        Or    senna-docusate (SENOKOT-S/PERICOLACE) 8.6-50 MG per tablet 2 tablet  2 tablet Oral BID PRN Humberto Fam DO        sodium chloride (PF) 0.9% PF flush 3 mL  3 mL Intracatheter Q8H Humberto Fam DO   3 mL at 02/23/25 1147    sodium chloride (PF) 0.9% PF flush 3 mL  3 mL Intracatheter q1 min prn Humberto Fam DO        tamsulosin (FLOMAX) capsule 0.4 mg  0.4 mg Oral Daily Humberto Fam DO   0.4 mg at 02/23/25 0846    valACYclovir (VALTREX) tablet 500 mg  500 mg Oral Daily Humberto Fma DO   500 mg at 02/23/25 0846    vancomycin (VANCOCIN) 1,500 mg in 0.9% NaCl 265 mL intermittent infusion  1,500 mg Intravenous Q24H Chencho Alston MD   1,500 mg at 02/23/25 1140         LABORATORY DATA  Lab Results   Component Value Date    WBC 9.4 02/22/2025    WBC 5.2 03/22/2011     Lab Results   Component Value Date    RBC 3.66 02/22/2025    RBC 4.71 03/22/2011     Lab Results   Component Value Date    HGB 11.9 02/22/2025    HGB 15.4 03/22/2011     Lab Results   Component Value Date    HCT 34.9 02/22/2025    HCT 43.9 03/22/2011     No components found for: \"MCT\"  Lab Results   Component Value Date    MCV 95 02/22/2025    MCV 93 03/22/2011     Lab Results   Component Value Date    MCH 32.5 02/22/2025    MCH 32.7 03/22/2011     Lab Results   Component Value Date    MCHC 34.1 02/22/2025    MCHC 35.1 03/22/2011     Lab Results   Component Value Date    RDW 12.0 02/22/2025    RDW 12.2 03/22/2011     Lab Results   Component Value Date     02/22/2025     03/22/2011     Last Comprehensive Metabolic Panel:  Sodium   Date Value Ref Range Status   02/23/2025 138 135 - 145 mmol/L Final   12/10/2024 140.3 135 - 146 mmol/L Final     Potassium   Date Value Ref Range Status   02/23/2025 3.7 3.4 - 5.3 mmol/L Final   12/10/2024 4.18 3.5 - 5.3 mmol/L Final     Chloride   Date Value Ref Range Status   02/23/2025 108 (H) 98 - 107 mmol/L Final   12/10/2024 " 106.5 98 - 110 mmol/L Final     Carbon Dioxide   Date Value Ref Range Status   12/10/2024 25.0 20 - 32 mmol/L Final     Carbon Dioxide (CO2)   Date Value Ref Range Status   02/23/2025 21 (L) 22 - 29 mmol/L Final     Anion Gap   Date Value Ref Range Status   02/23/2025 9 7 - 15 mmol/L Final   03/22/2011 8 6 - 17 mmol/L Final     Glucose   Date Value Ref Range Status   02/23/2025 100 (H) 70 - 99 mg/dL Final   12/10/2024 94 60 - 99 mg/dL Final     Urea Nitrogen   Date Value Ref Range Status   02/23/2025 11.0 8.0 - 23.0 mg/dL Final   12/10/2024 22 7 - 25 mg/dL Final     BUN/Creatinine Ratio   Date Value Ref Range Status   12/10/2024 20 6 - 32 Final     Creatinine   Date Value Ref Range Status   02/23/2025 1.13 0.67 - 1.17 mg/dL Final   12/10/2024 1.10 0.60 - 1.30 mg/dL Final     GFR Estimate   Date Value Ref Range Status   02/23/2025 73 >60 mL/min/1.73m2 Final     Comment:     eGFR calculated using 2021 CKD-EPI equation.   02/22/2018 74 > OR = 60 mL/min/1.73m2 Final     Calcium   Date Value Ref Range Status   02/23/2025 7.8 (L) 8.8 - 10.4 mg/dL Final   12/10/2024 9.5 8.6 - 10.3 mg/dL Final     Bilirubin Total   Date Value Ref Range Status   02/20/2025 1.7 (H) <=1.2 mg/dL Final   12/10/2024 1.5 (A) 0.2 - 1.2 mg/dL Final     Alkaline Phosphatase   Date Value Ref Range Status   02/20/2025 79 40 - 150 U/L Final   12/10/2024 63 33 - 130 U/L Final     ALT   Date Value Ref Range Status   02/20/2025 26 0 - 70 U/L Final   02/22/2018 62 (H) 9 - 46 U/L Final     AST   Date Value Ref Range Status   02/20/2025 28 0 - 45 U/L Final   02/22/2018 188 (H) 10 - 35 U/L Final       MICROBIOLOGY    2/20/25 BC x 1 NGTD    2/20/25 UC >100,000 CFU/mL Enterococcus faecalis Abnormal      RADIOLOGY  EXAM: CT ABDOMEN PELVIS W CONTRAST  LOCATION: [facility]  DATE: 2/20/2025     INDICATION: Left ureteral stent, rigors.     COMPARISON: 2/16/2025.     TECHNIQUE: CT scan of the abdomen and pelvis was performed following injection  of intravenous contrast. Multiplanar reformats were obtained. Dose reduction techniques were used.  CONTRAST: 100 mL Isovue 370.  FINDINGS:   LOWER CHEST: Atelectasis is present in both bases.  HEPATOBILIARY: Normal.  PANCREAS: Normal.  SPLEEN: Borderline splenomegaly at 13.1 cm. No focal lesions.  ADRENAL GLANDS: Normal.  KIDNEYS/BLADDER: There has been interval placement of a left double-J ureteral stent. Left-sided hydronephrosis has decreased slightly. No evidence of stone material along the stent. Perinephric and periureteral stranding is slightly increased from the   previous study. Punctate nonobstructing stone in the lower pole left kidney on image 94 of series 3. Otherwise, no evidence for renal or ureteral calculi through either kidney. Heterogeneous mass arising from the posterior mid to lower left kidney is   again noted and concerning for renal cell carcinoma. Tiny amount of nondependent gas is seen within the bladder. No evidence for bladder mass.  BOWEL: No colonic wall thickening or inflammatory change.  LYMPH NODES: Normal.  VASCULATURE: Normal.  PELVIC ORGANS: No free fluid or adenopathy through the pelvis.  MUSCULOSKELETAL: Mild degenerative changes are noted through the spine.                                                          IMPRESSION:   1.  Interval placement of left double-J ureteral stent. Left-sided hydronephrosis has decreased slightly. No evidence of stone material along the stent. Perinephric and periureteral stranding and edema has increased slightly, and could be related to   infection.  2.  Punctate nonobstructing stone in the lower pole left kidney.  3.  Stable heterogeneous mass arising from the posterior mid to lower left kidney concerning for renal cell carcinoma.     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   Continue the ceftriaxone for now, may stop in 1-2 d if all other cx remain neg or grow only Enterococcus  Continue the iv vancomycin with goal trough of 13-18  Follow up on blood cx  Stool for c diff  Planning 2 weeks therapy although this could be extended given the stent (if still in) and possibly in consideration of the tumor there    Today 2/23/2025  Clinically much improved  His BC remain negative  UC still only with Enterococcus faecalis   On iv vancomycin due to pcn allergy that sounds real (amox in 2004 with specific memory of it)    Recommendations  Continue the iv vancomycin and ceftriaxone for now, may stop ceftriaxone soon -   Stent removal timing per urology      Kyle Pack MD    "

## 2025-02-25 ENCOUNTER — HOME INFUSION BILLING (OUTPATIENT)
Dept: HOME HEALTH SERVICES | Facility: HOME HEALTH | Age: 65
End: 2025-02-25
Payer: COMMERCIAL

## 2025-02-25 ENCOUNTER — HOME INFUSION (OUTPATIENT)
Dept: HOME HEALTH SERVICES | Facility: HOME HEALTH | Age: 65
End: 2025-02-25
Payer: COMMERCIAL

## 2025-02-25 ENCOUNTER — APPOINTMENT (OUTPATIENT)
Dept: GENERAL RADIOLOGY | Facility: CLINIC | Age: 65
End: 2025-02-25
Attending: INTERNAL MEDICINE
Payer: COMMERCIAL

## 2025-02-25 VITALS
RESPIRATION RATE: 20 BRPM | DIASTOLIC BLOOD PRESSURE: 80 MMHG | BODY MASS INDEX: 29.05 KG/M2 | WEIGHT: 191.7 LBS | SYSTOLIC BLOOD PRESSURE: 147 MMHG | TEMPERATURE: 98.2 F | OXYGEN SATURATION: 98 % | HEIGHT: 68 IN | HEART RATE: 58 BPM

## 2025-02-25 DIAGNOSIS — N12 PYELONEPHRITIS OF LEFT KIDNEY: Primary | ICD-10-CM

## 2025-02-25 DIAGNOSIS — T83.592A INFECTION ASSOCIATED WITH INDWELLING URETERAL STENT, INITIAL ENCOUNTER: ICD-10-CM

## 2025-02-25 PROBLEM — A41.89: Status: ACTIVE | Noted: 2025-02-25

## 2025-02-25 PROBLEM — R65.20: Status: ACTIVE | Noted: 2025-02-25

## 2025-02-25 PROBLEM — B99.9 INFECTIOUS ENCEPHALOPATHY: Status: ACTIVE | Noted: 2025-02-25

## 2025-02-25 PROBLEM — N17.9 AKI (ACUTE KIDNEY INJURY): Status: ACTIVE | Noted: 2025-02-25

## 2025-02-25 PROBLEM — G93.49 INFECTIOUS ENCEPHALOPATHY: Status: ACTIVE | Noted: 2025-02-25

## 2025-02-25 LAB
ANION GAP SERPL CALCULATED.3IONS-SCNC: 9 MMOL/L (ref 7–15)
BACTERIA BLD CULT: NO GROWTH
BACTERIA BLD CULT: NO GROWTH
BUN SERPL-MCNC: 10.5 MG/DL (ref 8–23)
CALCIUM SERPL-MCNC: 8 MG/DL (ref 8.8–10.4)
CHLORIDE SERPL-SCNC: 111 MMOL/L (ref 98–107)
CREAT SERPL-MCNC: 1.09 MG/DL (ref 0.67–1.17)
EGFRCR SERPLBLD CKD-EPI 2021: 76 ML/MIN/1.73M2
ERYTHROCYTE [DISTWIDTH] IN BLOOD BY AUTOMATED COUNT: 12.2 % (ref 10–15)
GLUCOSE SERPL-MCNC: 112 MG/DL (ref 70–99)
HCO3 SERPL-SCNC: 22 MMOL/L (ref 22–29)
HCT VFR BLD AUTO: 35.5 % (ref 40–53)
HGB BLD-MCNC: 12.3 G/DL (ref 13.3–17.7)
MAGNESIUM SERPL-MCNC: 1.9 MG/DL (ref 1.7–2.3)
MCH RBC QN AUTO: 32.2 PG (ref 26.5–33)
MCHC RBC AUTO-ENTMCNC: 34.6 G/DL (ref 31.5–36.5)
MCV RBC AUTO: 93 FL (ref 78–100)
PHOSPHATE SERPL-MCNC: 3.1 MG/DL (ref 2.5–4.5)
PLATELET # BLD AUTO: 198 10E3/UL (ref 150–450)
POTASSIUM SERPL-SCNC: 3.6 MMOL/L (ref 3.4–5.3)
POTASSIUM SERPL-SCNC: 3.7 MMOL/L (ref 3.4–5.3)
RBC # BLD AUTO: 3.82 10E6/UL (ref 4.4–5.9)
SODIUM SERPL-SCNC: 142 MMOL/L (ref 135–145)
WBC # BLD AUTO: 5.8 10E3/UL (ref 4–11)

## 2025-02-25 PROCEDURE — 999N000065 XR CHEST PORT 1 VIEW

## 2025-02-25 PROCEDURE — 250N000013 HC RX MED GY IP 250 OP 250 PS 637: Performed by: INTERNAL MEDICINE

## 2025-02-25 PROCEDURE — 250N000011 HC RX IP 250 OP 636: Performed by: INTERNAL MEDICINE

## 2025-02-25 PROCEDURE — A4452 WATERPROOF TAPE: HCPCS

## 2025-02-25 PROCEDURE — 80048 BASIC METABOLIC PNL TOTAL CA: CPT | Performed by: INTERNAL MEDICINE

## 2025-02-25 PROCEDURE — 258N000003 HC RX IP 258 OP 636: Performed by: INTERNAL MEDICINE

## 2025-02-25 PROCEDURE — 84100 ASSAY OF PHOSPHORUS: CPT | Performed by: INTERNAL MEDICINE

## 2025-02-25 PROCEDURE — S9501 HIT ANTIBIOTIC Q12H DIEM: HCPCS

## 2025-02-25 PROCEDURE — 84132 ASSAY OF SERUM POTASSIUM: CPT | Performed by: INTERNAL MEDICINE

## 2025-02-25 PROCEDURE — 99239 HOSP IP/OBS DSCHRG MGMT >30: CPT | Performed by: INTERNAL MEDICINE

## 2025-02-25 PROCEDURE — A9270 NON-COVERED ITEM OR SERVICE: HCPCS

## 2025-02-25 PROCEDURE — 83735 ASSAY OF MAGNESIUM: CPT | Performed by: INTERNAL MEDICINE

## 2025-02-25 PROCEDURE — 272N000579 HC TRAY POWER PICC SOLO 4FR SINGLE LUMEN

## 2025-02-25 PROCEDURE — 36569 INSJ PICC 5 YR+ W/O IMAGING: CPT

## 2025-02-25 PROCEDURE — 36415 COLL VENOUS BLD VENIPUNCTURE: CPT | Performed by: INTERNAL MEDICINE

## 2025-02-25 PROCEDURE — 85018 HEMOGLOBIN: CPT | Performed by: INTERNAL MEDICINE

## 2025-02-25 PROCEDURE — A4245 ALCOHOL WIPES PER BOX: HCPCS

## 2025-02-25 PROCEDURE — 99231 SBSQ HOSP IP/OBS SF/LOW 25: CPT | Performed by: PHYSICIAN ASSISTANT

## 2025-02-25 RX ADMIN — VANCOMYCIN HYDROCHLORIDE 1500 MG: 10 INJECTION, POWDER, LYOPHILIZED, FOR SOLUTION INTRAVENOUS at 10:01

## 2025-02-25 RX ADMIN — HYDROMORPHONE HYDROCHLORIDE 2 MG: 2 TABLET ORAL at 02:00

## 2025-02-25 RX ADMIN — ACETAMINOPHEN 975 MG: 325 TABLET, FILM COATED ORAL at 10:01

## 2025-02-25 RX ADMIN — VALACYCLOVIR HYDROCHLORIDE 500 MG: 500 TABLET, FILM COATED ORAL at 07:59

## 2025-02-25 RX ADMIN — TAMSULOSIN HYDROCHLORIDE 0.4 MG: 0.4 CAPSULE ORAL at 07:59

## 2025-02-25 RX ADMIN — ACETAMINOPHEN 975 MG: 325 TABLET, FILM COATED ORAL at 01:59

## 2025-02-25 ASSESSMENT — ACTIVITIES OF DAILY LIVING (ADL)
ADLS_ACUITY_SCORE: 34

## 2025-02-25 NOTE — PROGRESS NOTES
Care Management Discharge Note    Discharge Date: 02/25/2025     Discharge Disposition: Home Infusion, Home    Discharge Services: Home Care    Discharge Transportation: family or friend will provide    Patient/family educated on Medicare website which has current facility and service quality ratings: yes    Education Provided on the Discharge Plan:  Yes  Persons Notified of Discharge Plans: pt  Patient/Family in Agreement with the Plan: yes    Handoff Referral Completed: Yes, [facility] PCP: Internal handoff referral completed    Additional Information:  Pt discharging to home with [facility] for IV ABX. Line in and [facility] completed teaching. Orders completed. [facility] will coordinate delivery of medication and supplies with pt. Ok to discharge from CM standpoint.     Ban Jung RN BSN   Inpatient Care Coordination  [facility]   Phone [phone]

## 2025-02-25 NOTE — PLAN OF CARE
Goal Outcome Evaluation:      Plan of Care Reviewed With: patient    Overall Patient Progress: no changeOverall Patient Progress: no change    Outcome Evaluation: Alert and oriented, being taught how IV home infusion and then plans to discharge to home. Up ad mouna.  Pt discharge info gone over and no further questions. Home infusion here and taught patient. Ready to discharge home with right PICC and will receive next infusion at home this evening. Wife here and will drive pt home.

## 2025-02-25 NOTE — PROGRESS NOTES
[facility]  Start of Care/Resumption of Care Note    \[facility]\"" SOC    DX: Infection associated with indwelling ureteral stent, initial encounter [T83.710W]     Therapy: Anti-Infective    Next Dose Due: 02/25/25 @ 2100    Delivery plan: END OF DAY ON 02/25/25    In-basket sent to \[facility]\"" Scheduling, requesting visit on 02/25/25    Per \[facility]\"" care plan, labs are due on THURSDAYS    Nursing plan: \[facility]\"" Nursing. \[facility]\"" to follow for nursing until agency takes over case on NA.     Teaching Plan: Liaison Teach Done?: YES    Other Info: NONE    Anmnarie Santiago, RN 02/25/25

## 2025-02-25 NOTE — PROGRESS NOTES
Infectious disease brief note  Chart checked, cultures reviewed  Plan outlined in the note from last evening  Will sign off please call if questions  Matias Samaniego MD  Infectious Disease

## 2025-02-25 NOTE — PLAN OF CARE
"Goal Outcome Evaluation:      Plan of Care Reviewed With: patient    Overall Patient Progress: improvingOverall Patient Progress: improving    VSS. RA. Afebrile. A/Ox4. 2-3/10 generalized soreness, scheduled tylenol x1, PO dilaudid x1. Denies nausea. Abdomen distended, per pt feels like it is improving. Pt having diarrhea, BM x2 overnight, voiding adequately. IVSL. Sleeping between cares.     Problem: Adult Inpatient Plan of Care  Goal: Plan of Care Review  Description: The Plan of Care Review/Shift note should be completed every shift.  The Outcome Evaluation is a brief statement about your assessment that the patient is improving, declining, or no change.  This information will be displayed automatically on your shift  note.  Outcome: Progressing  Flowsheets (Taken 2/25/2025 0535)  Plan of Care Reviewed With: patient  Overall Patient Progress: improving  Goal: Patient-Specific Goal (Individualized)  Description: You can add care plan individualizations to a care plan. Examples of Individualization might be:  \"Parent requests to be called daily at 9am for status\", \"I have a hard time hearing out of my right ear\", or \"Do not touch me to wake me up as it startles  me\".  Outcome: Progressing  Goal: Absence of Hospital-Acquired Illness or Injury  Outcome: Progressing  Intervention: Identify and Manage Fall Risk  Recent Flowsheet Documentation  Taken 2/24/2025 2100 by Carol Donovan, RN  Safety Promotion/Fall Prevention:   assistive device/personal items within reach   clutter free environment maintained   increased rounding and observation   nonskid shoes/slippers when out of bed   patient and family education   safety round/check completed   supervised activity  Intervention: Prevent Skin Injury  Recent Flowsheet Documentation  Taken 2/24/2025 2100 by Carol Donovan, RN  Body Position: position changed independently  Intervention: Prevent Infection  Recent Flowsheet Documentation  Taken 2/24/2025 2100 by Carol Donovan " IZA RN  Infection Prevention:   cohorting utilized   environmental surveillance performed   equipment surfaces disinfected   hand hygiene promoted   rest/sleep promoted   single patient room provided  Goal: Optimal Comfort and Wellbeing  Outcome: Progressing  Intervention: Monitor Pain and Promote Comfort  Recent Flowsheet Documentation  Taken 2/24/2025 2100 by Carol Donovan, RN  Pain Management Interventions:   repositioned   pain management plan reviewed with patient/caregiver  Goal: Readiness for Transition of Care  Outcome: Progressing     Problem: UTI (Urinary Tract Infection)  Goal: Improved Infection Symptoms  Outcome: Progressing     Problem: Skin Injury Risk Increased  Goal: Skin Health and Integrity  Outcome: Progressing  Intervention: Plan: Nurse Driven Intervention: Moisture Management  Recent Flowsheet Documentation  Taken 2/24/2025 2100 by Carol Donovan, RN  Moisture Interventions: Encourage regular toileting  Intervention: Optimize Skin Protection  Recent Flowsheet Documentation  Taken 2/24/2025 2100 by Carol Donovan, RN  Activity Management: activity adjusted per tolerance  Head of Bed (HOB) Positioning: HOB at 20-30 degrees

## 2025-02-25 NOTE — PROGRESS NOTES
[facility]rology Progress Note          Assessment and Plan:     Assessment:     POD 9  Cystourethroscopy Left ureteroscopy Left retrograde pyelogram with interpretation of intraoperative fluoroscopic imaging Holmium laser lithotripsy with basket stone extraction Left ureteral stent placement     Infection associated with indwelling ureteral stent, initial encounter    Left renal mass >5 cm    Left sided pyelonephritis    ANNALEE      Plan:   -Continue with indwelling ureteral stent.  Stent removal rescheduled to Thursday, 2/27/25, with Dr. Ramos.  -Continue with IV antibiotics.  Appreciate ID assistance.  Suspect that patient will need to complete a total of 10 to 14 days of IV vancomycin.  Will need coordination for outpatient IV antibiotics.  -ANNALEE has resolved.  Leukocytosis has also resolved.  -Possible side effects with an indwelling ureteral stent such as urgency and frequency of urination, dysuria, hematuria, symptoms of urine reflux, and some achiness in the side. Indwelling ureteral stents need to be exchanged every three months or removed by three months.    -Will eventually need partial nephrectomy for his left renal mass, but we do need to have the pyelonephritis and inflammation resolved prior to this.   -Flomax 0.4 mg once daily.  -Okay to discharge from urology perspective with IV antibiotics and ureteral stent.  Will see in clinic on Thursday.  Will plan on signing off.  Please contact use with any urological concerns.    Neha Mullins PA-C   [facility]  [phone]               Interval History:     Had a better night.  Denies fever and chills.  Did have diaphoresis overnight.  Denies nausea and vomiting.  Staff there to place PICC for IV vancomycin.  Occasional bradycardia.  Patient was afebrile.  Occasional tachypnea.  2 episodes of loose stools.  WBC 5.8 (6.8).  Hemoglobin 12.3.  Creatinine 1.09 EGFR 76.  On IV vancomycin.  IV Rocephin discontinued.              Review  of Systems:     The 5 point Review of Systems is negative other than noted in the HPI             Medications:     Current Facility-Administered Medications   Medication Dose Route Frequency Provider Last Rate Last Admin    acetaminophen (TYLENOL) tablet 975 mg  975 mg Oral Q8H Chencho Alston MD   975 mg at 02/25/25 0159    albuterol (PROVENTIL) neb solution 2.5 mg  2.5 mg Nebulization Q2H PRN Chencho Alston MD   2.5 mg at 02/22/25 1250    calcium carbonate (TUMS) chewable tablet 1,000 mg  1,000 mg Oral 4x Daily PRN Humberto Fam DO   1,000 mg at 02/23/25 1419    HYDROmorphone (DILAUDID) tablet 2 mg  2 mg Oral Q4H PRN Harshil Douglas MD   2 mg at 02/25/25 0200    HYDROmorphone (PF) (DILAUDID) injection 0.5 mg  0.5 mg Intravenous Q2H PRN Chencho Alston MD        lidocaine (LMX4) cream   Topical Q1H PRN Matias Samaniego MD        lidocaine (LMX4) cream   Topical Q1H PRN Humberto Fam DO        lidocaine 1 % 0.1-1 mL  0.1-1 mL Other Q1H PRN Humberto Fam DO        lidocaine 1 % 0.1-5 mL  0.1-5 mL Other Q1H PRN Matias Samaniego MD        naloxone (NARCAN) injection 0.2 mg  0.2 mg Intravenous Q2 Min PRN Humberto Fam DO        Or    naloxone (NARCAN) injection 0.4 mg  0.4 mg Intravenous Q2 Min PRN Humberto Fam DO        Or    naloxone (NARCAN) injection 0.2 mg  0.2 mg Intramuscular Q2 Min PRN Humberto Fam DO        Or    naloxone (NARCAN) injection 0.4 mg  0.4 mg Intramuscular Q2 Min PRN Humberto Fam DO        ondansetron (ZOFRAN ODT) ODT tab 4 mg  4 mg Oral Q6H PRN Humberto Fam DO   4 mg at 02/21/25 1806    Or    ondansetron (ZOFRAN) injection 4 mg  4 mg Intravenous Q6H PRN Humberto Fam DO   4 mg at 02/20/25 2058    oxyBUTYnin (DITROPAN) tablet 5 mg  5 mg Oral TID PRN Humberto Fam DO   5 mg at 02/21/25 0613    polyethylene glycol (MIRALAX) Packet 17 g  17 g Oral BID PRN Humberto Fam DO   17 g at 02/21/25 2469     "prochlorperazine (COMPAZINE) injection 10 mg  10 mg Intravenous Q6H PRN Chencho Alston MD   10 mg at 02/21/25 1957    Or    prochlorperazine (COMPAZINE) tablet 10 mg  10 mg Oral Q6H PRN Chencho Alston MD        senna-docusate (SENOKOT-S/PERICOLACE) 8.6-50 MG per tablet 1 tablet  1 tablet Oral BID PRN Humberto Fam DO        Or    senna-docusate (SENOKOT-S/PERICOLACE) 8.6-50 MG per tablet 2 tablet  2 tablet Oral BID PRN Humberto Fam DO        sodium chloride (PF) 0.9% PF flush 10-20 mL  10-20 mL Intracatheter q1 min prn Matias Samaniego MD        sodium chloride (PF) 0.9% PF flush 10-40 mL  10-40 mL Intracatheter Q7 Days Matias Samaniego MD        sodium chloride (PF) 0.9% PF flush 10-40 mL  10-40 mL Intracatheter Q1H PRN Matias Samaniego MD        sodium chloride (PF) 0.9% PF flush 10-40 mL  10-40 mL Intracatheter Once PRN Matias Samaniego MD        sodium chloride (PF) 0.9% PF flush 3 mL  3 mL Intracatheter Q8H Humberto Fam DO   3 mL at 02/24/25 2049    sodium chloride (PF) 0.9% PF flush 3 mL  3 mL Intracatheter q1 min prn Humberto Fam DO        tamsulosin (FLOMAX) capsule 0.4 mg  0.4 mg Oral Daily Humberto Fam DO   0.4 mg at 02/25/25 0759    valACYclovir (VALTREX) tablet 500 mg  500 mg Oral Daily Humberto Fam DO   500 mg at 02/25/25 0759    vancomycin (VANCOCIN) 1,500 mg in 0.9% NaCl 265 mL intermittent infusion  1,500 mg Intravenous Q12H Chencho Alston MD   1,500 mg at 02/24/25 2210                  Physical Exam:   Vitals were reviewed  Patient Vitals for the past 8 hrs:   BP Temp Temp src Pulse Resp SpO2   02/25/25 0741 (!) 147/80 98.2  F (36.8  C) Oral 58 20 98 %     GEN: NAD, lying in bed  EYES: EOMI  MOUTH: MMM  NECK: Supple  RESP: Unlabored breathing  NEURO: AAO  URO: Urinating on own           Data:   No results found for: \"NTBNPI\", \"NTBNP\"  Lab Results   Component Value Date    WBC 5.8 02/25/2025    WBC 6.8 02/24/2025    WBC 7.0 02/23/2025    HGB 12.3 (L) " 02/25/2025    HGB 12.4 (L) 02/24/2025    HGB 11.5 (L) 02/23/2025    HCT 35.5 (L) 02/25/2025    HCT 34.5 (L) 02/24/2025    HCT 32.7 (L) 02/23/2025    MCV 93 02/25/2025    MCV 91 02/24/2025    MCV 93 02/23/2025     02/25/2025     02/24/2025     (L) 02/23/2025     All cultures:  Recent Labs   Lab 02/20/25  1514 02/20/25  0613   CULTURE No growth after 4 days  No growth after 4 days >100,000 CFU/mL Enterococcus faecalis*

## 2025-02-25 NOTE — PLAN OF CARE
"HTN. Tylenol for back mild back pain.  Continues to receive IV Rocephin and Vanco.  Care coordinator consult placed. Plan for Vanco and home infusion.  Voiding and one loose stool today.  Rash noted at change of shift; pt stated it was similar last evening around the same time.  Expiratory wheezes continue, encouraged ambulation.   Problem: Adult Inpatient Plan of Care  Goal: Plan of Care Review  Description: The Plan of Care Review/Shift note should be completed every shift.  The Outcome Evaluation is a brief statement about your assessment that the patient is improving, declining, or no change.  This information will be displayed automatically on your shift  note.  Outcome: Progressing  Flowsheets (Taken 2/24/2025 2039)  Plan of Care Reviewed With:   patient   family  Overall Patient Progress: improving  Goal: Patient-Specific Goal (Individualized)  Description: You can add care plan individualizations to a care plan. Examples of Individualization might be:  \"Parent requests to be called daily at 9am for status\", \"I have a hard time hearing out of my right ear\", or \"Do not touch me to wake me up as it startles  me\".  Outcome: Progressing  Goal: Absence of Hospital-Acquired Illness or Injury  Outcome: Progressing  Intervention: Identify and Manage Fall Risk  Recent Flowsheet Documentation  Taken 2/24/2025 0950 by Jaida Quintanilla RN  Safety Promotion/Fall Prevention:   activity supervised   assistive device/personal items within reach   clutter free environment maintained   increased rounding and observation   nonskid shoes/slippers when out of bed   patient and family education   safety round/check completed   supervised activity  Goal: Optimal Comfort and Wellbeing  Outcome: Progressing  Intervention: Monitor Pain and Promote Comfort  Recent Flowsheet Documentation  Taken 2/24/2025 1741 by Jaida Quintanilla, RN  Pain Management Interventions: medication (see MAR)  Taken 2/24/2025 0950 by Jaida Quintanilla, " RN  Pain Management Interventions: medication (see MAR)  Goal: Readiness for Transition of Care  Outcome: Progressing     Problem: UTI (Urinary Tract Infection)  Goal: Improved Infection Symptoms  Outcome: Progressing     Problem: Skin Injury Risk Increased  Goal: Skin Health and Integrity  Outcome: Progressing   Goal Outcome Evaluation:      Plan of Care Reviewed With: patient, family    Overall Patient Progress: improvingOverall Patient Progress: improving

## 2025-02-25 NOTE — PLAN OF CARE
"Goal Outcome Evaluation:      Plan of Care Reviewed With: patient    Overall Patient Progress: no changeOverall Patient Progress: no change    Outcome Evaluation: Alert and oriented, being taught how IV home infusion and then plans to discharge to home. Up ad mouna.      Problem: Adult Inpatient Plan of Care  Goal: Plan of Care Review  Description: The Plan of Care Review/Shift note should be completed every shift.  The Outcome Evaluation is a brief statement about your assessment that the patient is improving, declining, or no change.  This information will be displayed automatically on your shift  note.  Outcome: Progressing  Flowsheets (Taken 2/25/2025 1336)  Outcome Evaluation: Alert and oriented, being taught how IV home infusion and then plans to discharge to home. Up ad mouna.  Plan of Care Reviewed With: patient  Overall Patient Progress: no change  Goal: Patient-Specific Goal (Individualized)  Description: You can add care plan individualizations to a care plan. Examples of Individualization might be:  \"Parent requests to be called daily at 9am for status\", \"I have a hard time hearing out of my right ear\", or \"Do not touch me to wake me up as it startles  me\".  Outcome: Progressing  Goal: Absence of Hospital-Acquired Illness or Injury  Outcome: Progressing  Intervention: Identify and Manage Fall Risk  Recent Flowsheet Documentation  Taken 2/25/2025 0816 by Annmarie Oviedo RN  Safety Promotion/Fall Prevention:   assistive device/personal items within reach   clutter free environment maintained   increased rounding and observation   nonskid shoes/slippers when out of bed   patient and family education   safety round/check completed   supervised activity  Intervention: Prevent Skin Injury  Recent Flowsheet Documentation  Taken 2/25/2025 0816 by Annmarie Oviedo RN  Body Position: position changed independently  Taken 2/25/2025 0741 by Annmarie Oviedo RN  Body Position: position changed " independently  Intervention: Prevent Infection  Recent Flowsheet Documentation  Taken 2/25/2025 0816 by Annmarie Oviedo RN  Infection Prevention:   cohorting utilized   environmental surveillance performed   equipment surfaces disinfected   hand hygiene promoted   rest/sleep promoted   single patient room provided  Goal: Optimal Comfort and Wellbeing  Outcome: Progressing  Goal: Readiness for Transition of Care  Outcome: Progressing     Problem: UTI (Urinary Tract Infection)  Goal: Improved Infection Symptoms  Outcome: Progressing     Problem: Skin Injury Risk Increased  Goal: Skin Health and Integrity  Outcome: Progressing  Intervention: Plan: Nurse Driven Intervention: Moisture Management  Recent Flowsheet Documentation  Taken 2/25/2025 0741 by Annmarie Oviedo RN  Bathing/Skin Care: linen changed  Intervention: Optimize Skin Protection  Recent Flowsheet Documentation  Taken 2/25/2025 0816 by Annmarie Oviedo RN  Activity Management: activity adjusted per tolerance  Head of Bed (HOB) Positioning: HOB at 20-30 degrees  Taken 2/25/2025 0741 by Annmarie Oviedo RN  Activity Management: activity adjusted per tolerance  Head of Bed (HOB) Positioning: HOB at 20-30 degrees

## 2025-02-25 NOTE — DISCHARGE SUMMARY
[facility] Discharge Summary    Benedict De Anda MRN# [id number]   Age: 64 year old YOB: 1960     Date of Admission:  2025  Date of Discharge::  2025  Admitting Physician:  Humberto Fam DO  Discharge Physician:  Chencho Alston MD     Home clinic: {:[id number]}          Admission Diagnoses:   Complicated UTI (urinary tract infection) [N39.0]  Infection associated with indwelling ureteral stent, initial encounter [T83.702A]          Discharge Diagnosis:     Principal Problem:    Severe sepsis with acute organ dysfunction due to Gram positive bacteria (H)  Active Problems:    Infection associated with indwelling ureteral stent, initial encounter    Pyelonephritis of left kidney    ANNALEE (acute kidney injury)    Infectious encephalopathy           Procedures:   PICC insertion          Medications Prior to Admission:     Medications Prior to Admission   Medication Sig Dispense Refill Last Dose/Taking    ciprofloxacin (CIPRO) 500 MG tablet Take 1 tablet (500 mg) by mouth 2 times daily for 5 days. 10 tablet 0 2025 Morning    [] ondansetron (ZOFRAN ODT) 4 MG ODT tab Take 1 tablet (4 mg) by mouth every 8 hours as needed for nausea or vomiting. 10 tablet 0 Unknown    oxyBUTYnin (DITROPAN) 5 MG tablet Take 1 tablet (5 mg) by mouth 3 times daily as needed for bladder spasms. 30 tablet 0 2025 Noon    phenazopyridine (PYRIDIUM) 100 MG tablet Take 1 tablet (100 mg) by mouth 3 times daily as needed for urinary tract discomfort. 21 tablet 0 Unknown    sildenafil (VIAGRA) 100 MG tablet Take 1 tablet (100 mg) by mouth daily as needed (for ED). 30 tablet 0 Unknown    tamsulosin (FLOMAX) 0.4 MG capsule Take 1 capsule (0.4 mg) by mouth daily. 30 capsule 0 2025 Morning    valACYclovir (VALTREX) 500 MG tablet Take 1 tablet (500 mg) by mouth daily. 90 tablet 3 2025 Morning             Discharge Medications:     Current Discharge Medication List        START taking  these medications    Details   vancomycin (VANCOCIN) 1500 mg/265 mL IVPB Inject 1,500 mg over 90 minutes into the vein every 12 hours for 11 days. Check creat and vanco level 2 times a week and CBC/diff, AST weekly and fax results to [facility]    Associated Diagnoses: Infection associated with indwelling ureteral stent, initial encounter           CONTINUE these medications which have NOT CHANGED    Details   oxyBUTYnin (DITROPAN) 5 MG tablet Take 1 tablet (5 mg) by mouth 3 times daily as needed for bladder spasms.  Qty: 30 tablet, Refills: 0    Associated Diagnoses: Kidney stone on left side      phenazopyridine (PYRIDIUM) 100 MG tablet Take 1 tablet (100 mg) by mouth 3 times daily as needed for urinary tract discomfort.  Qty: 21 tablet, Refills: 0      sildenafil (VIAGRA) 100 MG tablet Take 1 tablet (100 mg) by mouth daily as needed (for ED).  Qty: 30 tablet, Refills: 0    Comments: Run through with [employer] BIN [id number] [facility] [id number] Member ID [id number]  Associated Diagnoses: Erectile dysfunction, unspecified erectile dysfunction type      tamsulosin (FLOMAX) 0.4 MG capsule Take 1 capsule (0.4 mg) by mouth daily.  Qty: 30 capsule, Refills: 0    Associated Diagnoses: Kidney stone on left side      valACYclovir (VALTREX) 500 MG tablet Take 1 tablet (500 mg) by mouth daily.  Qty: 90 tablet, Refills: 3    Associated Diagnoses: Recurrent cold sores           STOP taking these medications       ciprofloxacin (CIPRO) 500 MG tablet Comments:   Reason for Stopping:         ondansetron (ZOFRAN ODT) 4 MG ODT tab Comments:   Reason for Stopping:                     Consultations:   Consultation during this admission received from infectious disease and urology          Brief History of Illness:   Benedict De Anda is a 64 year old male who came to attention on 2/20/2025 due to concern for new and rather abrupt onset of confusion and headache.       His recent medical history includes hospitalization from  "2/16-2/17 left ureteral stent placement with laser lithotripsy with basket stone extraction for management of a 6 x 4 mm calculus in the proximal left ureter.  He was also incidentally noted to have a 5 cm heterogeneously enhancing mass in the left kidney compatible with renal cell carcinoma.  The patient then returned to the emergency department on 2/20/2025 early in the morning with concern for flank pain and dizziness at which point he reported fever and dysuria.  Urology was contacted but patient was discharged home with ciprofloxacin.  It was in this setting that the patient had return to the emergency department with confusion and headache.     PMH includes nephrolithiasis, MDD/GIA.     On presentation to the emergency department, VS: /64 came down to 104/53 over the course of about 1/2-hour.  HR 91, RR 16 with oxygen saturation 95% breathing room air.  Temperature 98.6.  Exam: Patient was described as \"fatigued but nontoxic\"-appearing.  Labs: Creatinine 1.36 with normal electrolytes.  Total bilirubin 1.7.  Glucose 120.  Lactic acid 1.9.  VBG pH 7.44 with pCO2 36.  WBC 16.1, Hgb 14.2, .  Urinalysis from earlier in the day was strongly positive and had been sent for urine culture.  Blood cultures were also obtained in the ED at the time of admission.  Imaging: CT abdomen and pelvis with contrast shows \"Interval placement of left double-J ureteral stent. Left-sided hydronephrosis has decreased slightly. No evidence of stone material along the stent. Perinephric and periureteral stranding and edema has increased slightly, and could be related to   infection...\"  Mr. De Anda was started on Ceftriaxone 2 g IV q 24 hours and NS at 100 ml/hr.          Hospital Course:   ***    /80   Pulse 56   Temp 98.1  F (36.7  C) (Oral)   Resp 24   Ht 1.727 m (5' 8\")   Wt 87 kg (191 lb 11.2 oz)   SpO2 96%   BMI 29.15 kg/m    At the time of discharge, Mr. De Anda is alert, coherent and in NAD.   HEENT: No " focal muscular asymmetry  COR: RRR without murmur  Chest: CTA  Abd: Soft, NTND without mass  Extrem: No edema            Discharge Instructions and Follow-Up:     Discharge diet: Regular   Discharge activity: Activity as tolerated   Discharge follow-up: As planned with Urology           Discharge Disposition:     Discharged to home      Attestation:  I have reviewed today's vital signs, notes, medications, labs and imaging.    Chencho Alston MD

## 2025-02-25 NOTE — PROCEDURES
"[facility]    Single Lumen PICC Placement    Date/Time: 2/25/2025 8:52 AM    Performed by: Be Oviedo RN  Authorized by: Matias Samaniego MD  Indications: vascular access      UNIVERSAL PROTOCOL   Site Marked: Yes  Prior Images Obtained and Reviewed:  Yes  Required items: Required blood products, implants, devices and special equipment available    Patient identity confirmed:  Verbally with patient, arm band and hospital-assigned identification number  NA - No sedation, light sedation, or local anesthesia  Confirmation Checklist:  Patient's identity using two indicators, relevant allergies, procedure was appropriate and matched the consent or emergent situation and correct equipment/implants were available  Time out: Immediately prior to the procedure a time out was called    Universal Protocol: the Joint Commission Universal Protocol was followed    Preparation: Patient was prepped and draped in usual sterile fashion       ANESTHESIA    Anesthesia:  Local infiltration  Local Anesthetic:  Lidocaine 1% without epinephrine  Anesthetic Total (mL):  2      SEDATION    Patient Sedated: No        Preparation: skin prepped with ChloraPrep  Skin prep agent: skin prep agent completely dried prior to procedure  Sterile barriers: maximum sterile barriers were used: cap, mask, sterile gown, sterile gloves, and large sterile sheet  Hand hygiene: hand hygiene performed prior to central venous catheter insertion  Type of line used: PICC  Catheter type: single lumen  Lumen type: power PICC and valved  Catheter size: 4 Fr  Brand: Bard  Lot number: [id number]  Placement method: venipuncture, MST and ultrasound  Number of attempts: 1  Difficulty threading catheter: no  Successful placement: yes  Orientation: right  Catheter to Vein (%): 16  Location: basilic vein (8.1mm)  Tip Location: SVC (\"low SVC\")  Arm circumference: adults 10 cm  Extremity circumference: 34  Visible catheter length: 2  Total catheter length: " 43  Dressing and securement: alcohol impregnated caps, blood cleaned with CHG, blood removed, chlorhexidine disc applied, securement device, site cleansed, sterile dressing applied and subcutaneous anchor securement system  Post procedure assessment: blood return through all ports, free fluid flow and placement verified by x-ray  PROCEDURE      PICC ok to use, Verified tip in the low svc per CXR  Disposal: sharps and needle count correct at the end of procedure, needles and guidewire disposed in sharps container  Patient Tolerance:  Patient tolerated the procedure well with no immediate complications

## 2025-02-26 PROCEDURE — S9501 HIT ANTIBIOTIC Q12H DIEM: HCPCS

## 2025-02-27 ENCOUNTER — OFFICE VISIT (OUTPATIENT)
Dept: UROLOGY | Facility: CLINIC | Age: 65
End: 2025-02-27
Payer: COMMERCIAL

## 2025-02-27 ENCOUNTER — HOME INFUSION (OUTPATIENT)
Dept: HOME HEALTH SERVICES | Facility: HOME HEALTH | Age: 65
End: 2025-02-27

## 2025-02-27 ENCOUNTER — LAB REQUISITION (OUTPATIENT)
Dept: LAB | Facility: CLINIC | Age: 65
End: 2025-02-27
Payer: COMMERCIAL

## 2025-02-27 ENCOUNTER — HOME CARE VISIT (OUTPATIENT)
Dept: HOME HEALTH SERVICES | Facility: HOME HEALTH | Age: 65
End: 2025-02-27
Payer: COMMERCIAL

## 2025-02-27 ENCOUNTER — TELEPHONE (OUTPATIENT)
Dept: HOME HEALTH SERVICES | Facility: HOME HEALTH | Age: 65
End: 2025-02-27
Payer: COMMERCIAL

## 2025-02-27 VITALS
DIASTOLIC BLOOD PRESSURE: 80 MMHG | SYSTOLIC BLOOD PRESSURE: 140 MMHG | HEART RATE: 62 BPM | RESPIRATION RATE: 18 BRPM | TEMPERATURE: 98.4 F | OXYGEN SATURATION: 98 %

## 2025-02-27 VITALS
WEIGHT: 191 LBS | SYSTOLIC BLOOD PRESSURE: 150 MMHG | DIASTOLIC BLOOD PRESSURE: 84 MMHG | HEIGHT: 68 IN | BODY MASS INDEX: 28.95 KG/M2

## 2025-02-27 DIAGNOSIS — T83.592A INFECTION AND INFLAMMATORY REACTION DUE TO INDWELLING URETERAL STENT, INITIAL ENCOUNTER: ICD-10-CM

## 2025-02-27 DIAGNOSIS — N12 PYELONEPHRITIS OF LEFT KIDNEY: Primary | ICD-10-CM

## 2025-02-27 DIAGNOSIS — N20.0 CALCULUS OF KIDNEY: Primary | ICD-10-CM

## 2025-02-27 DIAGNOSIS — N28.89 LEFT RENAL MASS: ICD-10-CM

## 2025-02-27 LAB
BASOPHILS # BLD AUTO: 0 10E3/UL (ref 0–0.2)
BASOPHILS NFR BLD AUTO: 1 %
CREAT SERPL-MCNC: 1.08 MG/DL (ref 0.67–1.17)
EGFRCR SERPLBLD CKD-EPI 2021: 77 ML/MIN/1.73M2
EOSINOPHIL # BLD AUTO: 0.2 10E3/UL (ref 0–0.7)
EOSINOPHIL NFR BLD AUTO: 2 %
ERYTHROCYTE [DISTWIDTH] IN BLOOD BY AUTOMATED COUNT: 12.8 % (ref 10–15)
HCT VFR BLD AUTO: 37.8 % (ref 40–53)
HGB BLD-MCNC: 13.1 G/DL (ref 13.3–17.7)
IMM GRANULOCYTES # BLD: 0.2 10E3/UL
IMM GRANULOCYTES NFR BLD: 2 %
LYMPHOCYTES # BLD AUTO: 1.5 10E3/UL (ref 0.8–5.3)
LYMPHOCYTES NFR BLD AUTO: 18 %
MCH RBC QN AUTO: 32.5 PG (ref 26.5–33)
MCHC RBC AUTO-ENTMCNC: 34.7 G/DL (ref 31.5–36.5)
MCV RBC AUTO: 94 FL (ref 78–100)
MONOCYTES # BLD AUTO: 0.9 10E3/UL (ref 0–1.3)
MONOCYTES NFR BLD AUTO: 10 %
NEUTROPHILS # BLD AUTO: 5.7 10E3/UL (ref 1.6–8.3)
NEUTROPHILS NFR BLD AUTO: 67 %
NRBC # BLD AUTO: 0 10E3/UL
NRBC BLD AUTO-RTO: 0 /100
PLATELET # BLD AUTO: 350 10E3/UL (ref 150–450)
RBC # BLD AUTO: 4.03 10E6/UL (ref 4.4–5.9)
VANCOMYCIN SERPL-MCNC: 16.4 UG/ML
WBC # BLD AUTO: 8.4 10E3/UL (ref 4–11)

## 2025-02-27 PROCEDURE — 82565 ASSAY OF CREATININE: CPT | Performed by: INTERNAL MEDICINE

## 2025-02-27 PROCEDURE — S9501 HIT ANTIBIOTIC Q12H DIEM: HCPCS

## 2025-02-27 PROCEDURE — 80202 ASSAY OF VANCOMYCIN: CPT | Performed by: INTERNAL MEDICINE

## 2025-02-27 PROCEDURE — 85004 AUTOMATED DIFF WBC COUNT: CPT | Performed by: INTERNAL MEDICINE

## 2025-02-27 RX ORDER — LIDOCAINE HYDROCHLORIDE 20 MG/ML
JELLY TOPICAL ONCE
Status: COMPLETED | OUTPATIENT
Start: 2025-02-27 | End: 2025-02-27

## 2025-02-27 RX ORDER — CIPROFLOXACIN 500 MG/1
500 TABLET, FILM COATED ORAL ONCE
Qty: 1 TABLET | Refills: 0 | Status: CANCELLED | OUTPATIENT
Start: 2025-02-27 | End: 2025-02-27

## 2025-02-27 RX ADMIN — LIDOCAINE HYDROCHLORIDE: 20 JELLY TOPICAL at 15:33

## 2025-02-27 ASSESSMENT — PAIN SCALES - GENERAL: PAINLEVEL_OUTOF10: NO PAIN (0)

## 2025-02-27 NOTE — LETTER
"2/27/2025       RE: Benedict De Anda  [address]  [facility] [facility] [address]     Dear Colleague,    Thank you for referring your patient, Benedict De Anda, to the [facility] [place] at [facility]. Please see a copy of my visit note below.    CHIEF COMPLAINT   It was my pleasure to see Benedict De Anda who is a 64 year old male for follow-up of ureteroscopy for ureteral stone and discussion for partial nephrectomy.      HPI:  Benedict De Anda is a 64 year old male being seen for discussion regarding partial nephrectomy and stent removal.  Duration of problem: 1 month  Previous treatments: Ureteroscopy with Dr. Nj admitted for urinary tract infection     accompanied by his spouse and daughter  Reviewed previous notes   Doing better still on IV antibiotics with vancomycin   here to have his stent removed and surgery discussed    Exam:  BP (!) 150/84   Ht 1.727 m (5' 8\")   Wt 86.6 kg (191 lb)   BMI 29.04 kg/m    General: age-appropriate appearing male in NAD sitting in an exam chair  Resp: no respiratory distress  CV: heart rate regular  Abdomen: Degree of obesity is mild. Abdomen is soft and nontender. No organomegaly.   : Deferred to cystoscopy  Neuro: grossly non focal. Normal reflexes  Motor: excellent strength throughout      Stent Removal Notes    Pre-procedure diagnosis: Stent In Situ  Post procedure diagnosis: normal cystoscopy  Procedure performed: cystoscopy and stent removal  Surgeon: John Ramos MD  Anesthesia: local    Indications for procedure: Patient is a 64 year old year  old male with a history of ureteroscopy. He is here for stent removal.  Description of procedure: After fully informed voluntary consent was obtained patient was brought into the procedure room, identified and placed in a supine position on the cysto table.  The groin/scrotum were prepped and draped in a sterile fashion with betadine.  A 15F " flexible cystoscope was inserted into the urethra and the bladder and urethra examined in a systematic manner.  There were no tumor stones or diverticula.  Ureteric orifices were normal in position with stent  and effluxing clear urine.The stent was grasped with a stent removal forceps and removed.   Distal urethra was normal.  The patient tolerated the procedure well and there were no complications.      Assessment/Plan: Patient with a history of ureteroscopy for stent removal.  Follow up in 6 months with renal US    John Ramos MD  Review of Imaging:  The following imaging exams were independently viewed and interpreted by me and discussed with patient:  CT Scan Abd/Pelvis: EXAM: CT ABDOMEN PELVIS W CONTRAST  LOCATION: [facility]  DATE: 2/16/2025     INDICATION: Left flank pain.  COMPARISON: None.  TECHNIQUE: CT scan of the abdomen and pelvis was performed following injection of IV contrast. Multiplanar reformats were obtained. Dose reduction techniques were used.  CONTRAST: 100mL Isovue 370     FINDINGS:   LOWER CHEST: Bibasilar subsegmental atelectasis versus scar.     HEPATOBILIARY: Normal.     PANCREAS: Normal.     SPLEEN: Normal.     ADRENAL GLANDS: Normal.     KIDNEYS/BLADDER: Left hydronephrosis secondary to a 6 x 4 x 4 mm calculus proximal left ureter at the L3-L4 level. Punctate calculus within left lower pole calyx. No right-sided urinary tract calculus. 5 x 4.5 x 4.5 cm heterogeneously enhancing cortical   mass arising from posterior mid left renal cortex. Left renal veins are patent. Tiny hypoattenuating cortical lesions right kidney are too small to characterize with imaging. The right ureter and bladder are negative.     BOWEL: Diverticulosis of the colon. No acute inflammatory change. No obstruction.      LYMPH NODES: No lymphadenopathy.     VASCULATURE: No aortoiliac aneurysm.     PELVIC ORGANS: Normal.     MUSCULOSKELETAL: At least 3 mixed lytic/sclerotic bony exostoses  arising from the left pubic bone and bilateral superior pubic rami. Findings could represent multiple osteochondromas, but are technically indeterminate in setting of large left renal mass.                                                                      IMPRESSION:   1.  Left hydronephrosis secondary to a 6 x 4 x 4 mm calculus proximal left ureter at the L3-L4 level. Punctate nonobstructing calculus left lower pole calyx. No right-sided urinary tract calculi.  2.  5 x 4.5 x 4.5 cm heterogeneously enhancing mass arising from the posterior cortex of the mid left kidney, compatible with a renal cell carcinoma. No evidence of renal vein extension nor retroperitoneal lymphadenopathy.  3.  At least 3 mixed lytic/sclerotic bony exostoses arising from the left pubic bone and bilateral superior pubic rami. Differential considerations would include multiple osteochondromas. Osseous metastases are entirely excluded. MRI of the bony pelvis   would be recommended, if no previous comparison studies available.  EXAM: NM BONE SCAN WHOLE BODY  LOCATION: [facility]  DATE: 2/17/2025     INDICATION: Malignant neoplasm of left kidney, except renal pelvis  COMPARISON: CT the abdomen pelvis dated 2/16/2025 and CT the chest dated 2/17/2025.  TECHNIQUE: 27.5 mCi technetium-99m MDP, IV. Anterior and posterior delayed whole-body images at 3 hours with additional spot images of the skull.     FINDINGS: Radiotracer uptake in a pattern typical of degenerative change involving the shoulders, spine, hips, knees, mid feet, and great toes, without suspicious areas of altered uptake to suggest osseous metastasis.                                                                      IMPRESSION:     No osseous metastasis.  EXAM: CT CHEST W CONTRAST  LOCATION: [facility]  DATE: 2/17/2025     INDICATION: renal mass  >5 cm  COMPARISON: Abdomen/pelvis CT from 02/16/2025 is reviewed.   TECHNIQUE: CT chest  with IV contrast. Multiplanar reformats were obtained. Dose reduction techniques were used.     CONTRAST: 96mL Isovue 370     FINDINGS:   LUNGS AND PLEURA: Benign variant right upper lobe azygos fissure. Strands of atelectasis both lungs.      MEDIASTINUM/AXILLAE: Normal.     CORONARY ARTERY CALCIFICATION: None.      UPPER ABDOMEN: Left ureteral stent.      MUSCULOSKELETAL: Calcific tendinitis right rotator cuff. Old right sixth rib fracture. Minimal degenerative change T3-T4 interspace.                                                                       IMPRESSION:   No evidence of thoracic metastases       Review of Labs:  The following labs were reviewed by me and discussed with the patient:  Component      Latest Ref Rng 2/25/2025  6:56 AM   Creatinine      0.67 - 1.17 mg/dL 1.09    Sodium      135 - 145 mmol/L 142    Calcium      8.8 - 10.4 mg/dL 8.0 (L)    Potassium      3.4 - 5.3 mmol/L 3.7    Chloride      98 - 107 mmol/L 111 (H)    Carbon Dioxide (CO2)      22 - 29 mmol/L 22    Anion Gap      7 - 15 mmol/L 9    Urea Nitrogen      8.0 - 23.0 mg/dL 10.5    GFR Estimate      >60 mL/min/1.73m2 76    Glucose      70 - 99 mg/dL 112 (H)       Legend:  (L) Low  (H) High    Assessment & Plan    Calculus of kidney  Stent removed  No issues, continue with antibiotics  - lidocaine (XYLOCAINE) 2 % external gel    Left renal mass  Discussed about the left renal mass which is approximately 5 cm in size and the likelihood of malignancy with this  Discussed about the treatment options including active surveillance with imaging, partial nephrectomy with robotic assistance as well as ablation with cryotherapy  We talked about the benefits and drawbacks of each of these approaches and fact that surgical resection may be a better option considering his overall status health and age  We talked in detail about the surgery, hospital stay, postoperative recovery and follow-up plan  We also discussed in detail about the likely  issues associated with the surgery which include but are not limited to bleeding, need for transfusion, wound infection, chest infection, urinary tract infection, infected collections, urine leak (1%), postoperative ileus, DVT, PE, need for anticoagulation.  Rare complications like surrounding organ injury to the bowel spleen and large blood vessels were also discussed which are less than 1% and can be managed at the time of the primary procedure  Based on the discussions we had he wants to take some time to discuss about the treatment options.  In view of his recent significant kidney infection I would recommend at least 4 to 6 weeks of waiting time before partial nephrectomy.  He will get back to us following the decision      John Ramos MD  [facility] [place]      ==========================    Additional Billing and Coding Information:  Review of external notes as documented above   Review of the result(s) of each unique test - CT chest, CT abdomen pelvis, bone scan, BMP                40 minutes spent by me on the date of the encounter doing chart review, review of test results, interpretation of tests, patient visit, documentation, and discussion with family apart from time spent at cystoscopy    ==========================      Again, thank you for allowing me to participate in the care of your patient.      Sincerely,    John Ramos MD

## 2025-02-27 NOTE — PATIENT INSTRUCTIONS
"AFTER YOUR CYSTOSCOPY AND STENT REMOVAL  ?  ?  You have just completed a cystoscopy, or \"cysto\", which allowed your physician to learn more about your bladder (or to remove a stent placed after surgery). We suggest that you continue to avoid caffeine, fruit juice, and alcohol for the next 24 hours, however, you are encouraged to return to your normal activities.  ?  ?  A few things that are considered normal after your cystoscopy:  ?  * small amount of bleeding (or spotting) that clears within the next 24 hours  ?  * slight burning sensation with urination  ?  * sensation of needing to void (urinate) more frequently  ?  * the feeling of \"air\" in your urine  ?  * mild discomfort that is relieved with Tylenol    * bladder spasms  ?  ?  ?  Please contact our office promptly if you:  ?  * develop a fever above 101 degrees  ?  * are unable to urinate  ?  * develop bright red blood that does not stop  ?  * experience severe pain or swelling  ?  ?  ?  And of course, please contact our office with any concerns or questions [phone].  ?  Discussed surgery and Cryoablation   He will update us on the decision     "

## 2025-02-27 NOTE — PROGRESS NOTES
"CHIEF COMPLAINT   It was my pleasure to see Benedict De Anda who is a 64 year old male for follow-up of ureteroscopy for ureteral stone and discussion for partial nephrectomy.      HPI:  Benedict De Anda is a 64 year old male being seen for discussion regarding partial nephrectomy and stent removal.  Duration of problem: 1 month  Previous treatments: Ureteroscopy with Dr. Nj admitted for urinary tract infection     accompanied by his spouse and daughter  Reviewed previous notes   Doing better still on IV antibiotics with vancomycin   here to have his stent removed and surgery discussed    Exam:  BP (!) 150/84   Ht 1.727 m (5' 8\")   Wt 86.6 kg (191 lb)   BMI 29.04 kg/m    General: age-appropriate appearing male in NAD sitting in an exam chair  Resp: no respiratory distress  CV: heart rate regular  Abdomen: Degree of obesity is mild. Abdomen is soft and nontender. No organomegaly.   : Deferred to cystoscopy  Neuro: grossly non focal. Normal reflexes  Motor: excellent strength throughout      Stent Removal Notes    Pre-procedure diagnosis: Stent In Situ  Post procedure diagnosis: normal cystoscopy  Procedure performed: cystoscopy and stent removal  Surgeon: John Ramos MD  Anesthesia: local    Indications for procedure: Patient is a 64 year old year  old male with a history of ureteroscopy. He is here for stent removal.  Description of procedure: After fully informed voluntary consent was obtained patient was brought into the procedure room, identified and placed in a supine position on the cysto table.  The groin/scrotum were prepped and draped in a sterile fashion with betadine.  A 15F flexible cystoscope was inserted into the urethra and the bladder and urethra examined in a systematic manner.  There were no tumor stones or diverticula.  Ureteric orifices were normal in position with stent  and effluxing clear urine.The stent was grasped with a stent removal forceps and removed.   Distal urethra was " normal.  The patient tolerated the procedure well and there were no complications.      Assessment/Plan: Patient with a history of ureteroscopy for stent removal.  Follow up in 6 months with renal US    John Ramos MD  Review of Imaging:  The following imaging exams were independently viewed and interpreted by me and discussed with patient:  CT Scan Abd/Pelvis: EXAM: CT ABDOMEN PELVIS W CONTRAST  LOCATION: [facility]  DATE: 2/16/2025     INDICATION: Left flank pain.  COMPARISON: None.  TECHNIQUE: CT scan of the abdomen and pelvis was performed following injection of IV contrast. Multiplanar reformats were obtained. Dose reduction techniques were used.  CONTRAST: 100mL Isovue 370     FINDINGS:   LOWER CHEST: Bibasilar subsegmental atelectasis versus scar.     HEPATOBILIARY: Normal.     PANCREAS: Normal.     SPLEEN: Normal.     ADRENAL GLANDS: Normal.     KIDNEYS/BLADDER: Left hydronephrosis secondary to a 6 x 4 x 4 mm calculus proximal left ureter at the L3-L4 level. Punctate calculus within left lower pole calyx. No right-sided urinary tract calculus. 5 x 4.5 x 4.5 cm heterogeneously enhancing cortical   mass arising from posterior mid left renal cortex. Left renal veins are patent. Tiny hypoattenuating cortical lesions right kidney are too small to characterize with imaging. The right ureter and bladder are negative.     BOWEL: Diverticulosis of the colon. No acute inflammatory change. No obstruction.      LYMPH NODES: No lymphadenopathy.     VASCULATURE: No aortoiliac aneurysm.     PELVIC ORGANS: Normal.     MUSCULOSKELETAL: At least 3 mixed lytic/sclerotic bony exostoses arising from the left pubic bone and bilateral superior pubic rami. Findings could represent multiple osteochondromas, but are technically indeterminate in setting of large left renal mass.                                                                      IMPRESSION:   1.  Left hydronephrosis secondary to a 6 x 4 x 4  mm calculus proximal left ureter at the L3-L4 level. Punctate nonobstructing calculus left lower pole calyx. No right-sided urinary tract calculi.  2.  5 x 4.5 x 4.5 cm heterogeneously enhancing mass arising from the posterior cortex of the mid left kidney, compatible with a renal cell carcinoma. No evidence of renal vein extension nor retroperitoneal lymphadenopathy.  3.  At least 3 mixed lytic/sclerotic bony exostoses arising from the left pubic bone and bilateral superior pubic rami. Differential considerations would include multiple osteochondromas. Osseous metastases are entirely excluded. MRI of the bony pelvis   would be recommended, if no previous comparison studies available.  EXAM: NM BONE SCAN WHOLE BODY  LOCATION: [facility]  DATE: 2/17/2025     INDICATION: Malignant neoplasm of left kidney, except renal pelvis  COMPARISON: CT the abdomen pelvis dated 2/16/2025 and CT the chest dated 2/17/2025.  TECHNIQUE: 27.5 mCi technetium-99m MDP, IV. Anterior and posterior delayed whole-body images at 3 hours with additional spot images of the skull.     FINDINGS: Radiotracer uptake in a pattern typical of degenerative change involving the shoulders, spine, hips, knees, mid feet, and great toes, without suspicious areas of altered uptake to suggest osseous metastasis.                                                                      IMPRESSION:     No osseous metastasis.  EXAM: CT CHEST W CONTRAST  LOCATION: [facility]  DATE: 2/17/2025     INDICATION: renal mass  >5 cm  COMPARISON: Abdomen/pelvis CT from 02/16/2025 is reviewed.   TECHNIQUE: CT chest with IV contrast. Multiplanar reformats were obtained. Dose reduction techniques were used.     CONTRAST: 96mL Isovue 370     FINDINGS:   LUNGS AND PLEURA: Benign variant right upper lobe azygos fissure. Strands of atelectasis both lungs.      MEDIASTINUM/AXILLAE: Normal.     CORONARY ARTERY CALCIFICATION: None.      UPPER  ABDOMEN: Left ureteral stent.      MUSCULOSKELETAL: Calcific tendinitis right rotator cuff. Old right sixth rib fracture. Minimal degenerative change T3-T4 interspace.                                                                       IMPRESSION:   No evidence of thoracic metastases       Review of Labs:  The following labs were reviewed by me and discussed with the patient:  Component      Latest Ref Rng 2/25/2025  6:56 AM   Creatinine      0.67 - 1.17 mg/dL 1.09    Sodium      135 - 145 mmol/L 142    Calcium      8.8 - 10.4 mg/dL 8.0 (L)    Potassium      3.4 - 5.3 mmol/L 3.7    Chloride      98 - 107 mmol/L 111 (H)    Carbon Dioxide (CO2)      22 - 29 mmol/L 22    Anion Gap      7 - 15 mmol/L 9    Urea Nitrogen      8.0 - 23.0 mg/dL 10.5    GFR Estimate      >60 mL/min/1.73m2 76    Glucose      70 - 99 mg/dL 112 (H)       Legend:  (L) Low  (H) High    Assessment & Plan     Calculus of kidney  Stent removed  No issues, continue with antibiotics  - lidocaine (XYLOCAINE) 2 % external gel    Left renal mass  Discussed about the left renal mass which is approximately 5 cm in size and the likelihood of malignancy with this  Discussed about the treatment options including active surveillance with imaging, partial nephrectomy with robotic assistance as well as ablation with cryotherapy  We talked about the benefits and drawbacks of each of these approaches and fact that surgical resection may be a better option considering his overall status health and age  We talked in detail about the surgery, hospital stay, postoperative recovery and follow-up plan  We also discussed in detail about the likely issues associated with the surgery which include but are not limited to bleeding, need for transfusion, wound infection, chest infection, urinary tract infection, infected collections, urine leak (1%), postoperative ileus, DVT, PE, need for anticoagulation.  Rare complications like surrounding organ injury to the bowel spleen  and large blood vessels were also discussed which are less than 1% and can be managed at the time of the primary procedure  Based on the discussions we had he wants to take some time to discuss about the treatment options.  In view of his recent significant kidney infection I would recommend at least 4 to 6 weeks of waiting time before partial nephrectomy.  He will get back to us following the decision      John Ramos MD  [facility] [place]      ==========================    Additional Billing and Coding Information:  Review of external notes as documented above   Review of the result(s) of each unique test - CT chest, CT abdomen pelvis, bone scan, BMP                40 minutes spent by me on the date of the encounter doing chart review, review of test results, interpretation of tests, patient visit, documentation, and discussion with family apart from time spent at cystoscopy    ==========================

## 2025-02-27 NOTE — NURSING NOTE
Chief Complaint   Patient presents with    Kidney Stone Related     Stent removal     Prior to the start of the procedure and with procedural staff participation, I verbally confirmed the patient s identity using two indicators, relevant allergies, that the procedure was appropriate and matched the consent or emergent situation, and that the correct equipment/implants were available. Immediately prior to starting the procedure I conducted the Time Out with the procedural staff and re-confirmed the patient s name, procedure, and site/side. I have wiped the patient off with the povidone-Iodine solution, draped them, and used Lidocaine hydrochloride jelly. (The Joint Commission universal protocol was followed.)  Yes    Sedation (Moderate or Deep): None    5mL 2% lidocaine hydrochloride Urojet instilled into urethra.    NDC# [address]-1  Lot #: [id number]  Expiration Date:  7/26    Pt currently on IV vancomycin    Annemarie Branch, Clinic Assistant

## 2025-02-28 ENCOUNTER — HOME INFUSION BILLING (OUTPATIENT)
Dept: HOME HEALTH SERVICES | Facility: HOME HEALTH | Age: 65
End: 2025-02-28
Payer: COMMERCIAL

## 2025-02-28 PROCEDURE — S9501 HIT ANTIBIOTIC Q12H DIEM: HCPCS

## 2025-02-28 PROCEDURE — A9270 NON-COVERED ITEM OR SERVICE: HCPCS

## 2025-02-28 NOTE — PROGRESS NOTES
Nursing Visit Note:  Nurse visit today for SOC for Benedict BRANTLEY Adilson.     present during visit today: Not Applicable.    Intravenous Access:  PICC.    Patient antibiotic teaching done in hospital. independent with administration since returning home.    Note: Patient VSS and oriented. Patient with mild hypertension, outside of patient baseline per patient. Received  disappointing news  about tumor today and is feeling a little nervous. Advised patient to follow up with GP if hypertension persists. Feels confident in medication administration, all questions answered. Labs drawn from [facility], blood return positional, flushes easily. No other questions or concerns at this time.     Saline administered: 60 (ml)    Supply Check:   Does the patient have all the supplies they need for the next visit?  No, Order placed for pre-printed labels     Next visit plan: Monday 3/3/25 for labs    Sujata Spencer, RN 2/27/2025

## 2025-03-01 PROCEDURE — S9501 HIT ANTIBIOTIC Q12H DIEM: HCPCS

## 2025-03-02 PROCEDURE — S9501 HIT ANTIBIOTIC Q12H DIEM: HCPCS

## 2025-03-03 ENCOUNTER — HOME CARE VISIT (OUTPATIENT)
Dept: HOME HEALTH SERVICES | Facility: HOME HEALTH | Age: 65
End: 2025-03-03
Payer: COMMERCIAL

## 2025-03-03 ENCOUNTER — LAB REQUISITION (OUTPATIENT)
Dept: LAB | Facility: CLINIC | Age: 65
End: 2025-03-03
Payer: COMMERCIAL

## 2025-03-03 ENCOUNTER — HOME INFUSION BILLING (OUTPATIENT)
Dept: HOME HEALTH SERVICES | Facility: HOME HEALTH | Age: 65
End: 2025-03-03
Payer: COMMERCIAL

## 2025-03-03 VITALS
OXYGEN SATURATION: 97 % | WEIGHT: 176 LBS | DIASTOLIC BLOOD PRESSURE: 68 MMHG | RESPIRATION RATE: 18 BRPM | SYSTOLIC BLOOD PRESSURE: 148 MMHG | BODY MASS INDEX: 26.76 KG/M2 | HEART RATE: 79 BPM | TEMPERATURE: 98.9 F

## 2025-03-03 DIAGNOSIS — T83.592A INFECTION AND INFLAMMATORY REACTION DUE TO INDWELLING URETERAL STENT, INITIAL ENCOUNTER: ICD-10-CM

## 2025-03-03 LAB
BASOPHILS # BLD AUTO: 0.1 10E3/UL (ref 0–0.2)
BASOPHILS NFR BLD AUTO: 1 %
CREAT SERPL-MCNC: 1.12 MG/DL (ref 0.67–1.17)
EGFRCR SERPLBLD CKD-EPI 2021: 74 ML/MIN/1.73M2
EOSINOPHIL # BLD AUTO: 0.2 10E3/UL (ref 0–0.7)
EOSINOPHIL NFR BLD AUTO: 2 %
ERYTHROCYTE [DISTWIDTH] IN BLOOD BY AUTOMATED COUNT: 12.5 % (ref 10–15)
HCT VFR BLD AUTO: 41.8 % (ref 40–53)
HGB BLD-MCNC: 14.4 G/DL (ref 13.3–17.7)
IMM GRANULOCYTES # BLD: 0 10E3/UL
IMM GRANULOCYTES NFR BLD: 1 %
LYMPHOCYTES # BLD AUTO: 1.3 10E3/UL (ref 0.8–5.3)
LYMPHOCYTES NFR BLD AUTO: 16 %
MCH RBC QN AUTO: 32.7 PG (ref 26.5–33)
MCHC RBC AUTO-ENTMCNC: 34.4 G/DL (ref 31.5–36.5)
MCV RBC AUTO: 95 FL (ref 78–100)
MONOCYTES # BLD AUTO: 0.9 10E3/UL (ref 0–1.3)
MONOCYTES NFR BLD AUTO: 11 %
NEUTROPHILS # BLD AUTO: 5.8 10E3/UL (ref 1.6–8.3)
NEUTROPHILS NFR BLD AUTO: 70 %
NRBC # BLD AUTO: 0 10E3/UL
NRBC BLD AUTO-RTO: 0 /100
PLATELET # BLD AUTO: 385 10E3/UL (ref 150–450)
RBC # BLD AUTO: 4.41 10E6/UL (ref 4.4–5.9)
VANCOMYCIN SERPL-MCNC: 16.7 UG/ML
WBC # BLD AUTO: 8.2 10E3/UL (ref 4–11)

## 2025-03-03 PROCEDURE — A4215 STERILE NEEDLE: HCPCS

## 2025-03-03 PROCEDURE — A9270 NON-COVERED ITEM OR SERVICE: HCPCS

## 2025-03-03 PROCEDURE — 82565 ASSAY OF CREATININE: CPT | Performed by: INTERNAL MEDICINE

## 2025-03-03 PROCEDURE — 80202 ASSAY OF VANCOMYCIN: CPT | Performed by: INTERNAL MEDICINE

## 2025-03-03 PROCEDURE — 99601 HOME NFS VISIT <2 HRS: CPT

## 2025-03-03 PROCEDURE — S9501 HIT ANTIBIOTIC Q12H DIEM: HCPCS

## 2025-03-03 PROCEDURE — 85004 AUTOMATED DIFF WBC COUNT: CPT | Performed by: INTERNAL MEDICINE

## 2025-03-03 RX ORDER — WATER 10 ML/10ML
2.2 INJECTION INTRAMUSCULAR; INTRAVENOUS; SUBCUTANEOUS PRN
Qty: 999999 ML | Refills: 0 | Status: DISPENSED | OUTPATIENT
Start: 2025-03-03 | End: 2025-03-06

## 2025-03-03 NOTE — PROGRESS NOTES
Nursing Visit Note:  Nurse visit today for CLC and lab draw for Benedict BRANTLEY Adilson.     present during visit today: Not Applicable.    Intravenous Access:  Lab draw site L AC, Attempts 1 and PICC.    Lab-Only Nursing Note    Labs obtained via VPT    Time Specimen drawn: 0920    Last dose (if applicable): Yes: Last dose date: 3/2/2025  Last dose start time: 2000  Last dose end time: 2230    Facility sent to: [facility] Tracking number: [id number]    Note: CLC done due to dressing starting to peel and needing to be reinforced. TPA requested d/t no blood return from PICC. flushes well. Pt relieved that urinary symptoms have resolved.     Saline administered: 80 (ml)    Supply Check:   Does the patient have all the supplies they need for the next visit?  No, Order placed for flushes, CLC kits, extension sets    Next visit plan: TPA once available.     Shen Greene RN 3/3/2025

## 2025-03-03 NOTE — Clinical Note
Martín needs TPA for his PICC line. Flushes well but no blood return. He also needs CLC kits, extension sets and flushes.

## 2025-03-04 ENCOUNTER — HOME CARE VISIT (OUTPATIENT)
Dept: HOME HEALTH SERVICES | Facility: HOME HEALTH | Age: 65
End: 2025-03-04
Payer: COMMERCIAL

## 2025-03-04 ENCOUNTER — HOME INFUSION BILLING (OUTPATIENT)
Dept: HOME HEALTH SERVICES | Facility: HOME HEALTH | Age: 65
End: 2025-03-04
Payer: COMMERCIAL

## 2025-03-04 VITALS
SYSTOLIC BLOOD PRESSURE: 140 MMHG | HEART RATE: 64 BPM | OXYGEN SATURATION: 98 % | TEMPERATURE: 98.6 F | RESPIRATION RATE: 16 BRPM | DIASTOLIC BLOOD PRESSURE: 80 MMHG

## 2025-03-04 PROCEDURE — A4217 STERILE WATER/SALINE, 500 ML: HCPCS | Mod: JZ

## 2025-03-04 PROCEDURE — 99601 HOME NFS VISIT <2 HRS: CPT

## 2025-03-04 PROCEDURE — A9270 NON-COVERED ITEM OR SERVICE: HCPCS

## 2025-03-04 PROCEDURE — S9501 HIT ANTIBIOTIC Q12H DIEM: HCPCS

## 2025-03-04 NOTE — PROGRESS NOTES
Nursing Visit Note:  Nurse visit today for TPA PICC line for blood return  for Benedict De Anda.     present during visit today: Not Applicable.    Intravenous Access:  PICC. and NA.    NA    Note: TPA PICC line over 90 minutes with Alteplase has good blood return and flushed with minimal resistance.  Patient is on hold with Vancomycin dosage, per Pharmacist instructed, due to his recent reports of having ringing in his right ear.      Saline administered: 40ml (ml)    Supply Check:   Does the patient have all the supplies they need for the next visit?  Yes    Next visit plan: Thursday for labs draw and assessment.    David Bell RN 3/4/2025

## 2025-03-05 ENCOUNTER — HOME CARE VISIT (OUTPATIENT)
Dept: HOME HEALTH SERVICES | Facility: HOME HEALTH | Age: 65
End: 2025-03-05
Payer: COMMERCIAL

## 2025-03-05 VITALS
HEART RATE: 68 BPM | OXYGEN SATURATION: 98 % | DIASTOLIC BLOOD PRESSURE: 80 MMHG | RESPIRATION RATE: 16 BRPM | TEMPERATURE: 98.6 F | SYSTOLIC BLOOD PRESSURE: 140 MMHG

## 2025-03-05 PROCEDURE — S9501 HIT ANTIBIOTIC Q12H DIEM: HCPCS

## 2025-03-05 PROCEDURE — 99601 HOME NFS VISIT <2 HRS: CPT

## 2025-03-06 PROCEDURE — S9501 HIT ANTIBIOTIC Q12H DIEM: HCPCS

## 2025-03-06 NOTE — PROGRESS NOTES
[facility]charge [facility]    Will patient remain open to Pharmacy Only?: No    Reason for Discharge: Therapy Completed    Additional Comments: Pt reports ringing in right ear to pharmacy yesterday evening. Pharmacist told pt to stop  Vanco infusions. Okayed to pull PICC. Pt states ringing in ear is improving.     Discharge Disposition: Therapy Discontinued/No further Skilled Nursing    If Transferred to Agency, Name of Agency: N/A    Brief Summary of Admission to Home Infusion:   Reason for Admission: Infection S/P kidney stones and stent placement   Treatment Provided: Vancomycin IV  Significant Findings: WBC wnl now  Discharge Instructions: post PICC removal instructions reviewed with patient. Patient verbalized understanding. All questions answered.

## 2025-03-07 PROCEDURE — S9501 HIT ANTIBIOTIC Q12H DIEM: HCPCS

## 2025-03-08 PROCEDURE — S9501 HIT ANTIBIOTIC Q12H DIEM: HCPCS

## 2025-04-01 DIAGNOSIS — N52.9 ERECTILE DYSFUNCTION, UNSPECIFIED ERECTILE DYSFUNCTION TYPE: ICD-10-CM

## 2025-04-02 NOTE — TELEPHONE ENCOUNTER
Benedict De Anda is requesting a refill of:    Pending Prescriptions:                       Disp   Refills    sildenafil (VIAGRA) 100 MG tablet [Pharma*30 tab*0            Sig: TAKE 1 TABLET(100 MG) BY MOUTH DAILY AS NEEDED           FOR ERECTILE DYSFUNCTION    Please close encounter if RX was sent. Thanks, Regina

## 2025-04-03 RX ORDER — SILDENAFIL 100 MG/1
TABLET, FILM COATED ORAL
Qty: 30 TABLET | Refills: 0 | Status: SHIPPED | OUTPATIENT
Start: 2025-04-03

## 2025-04-29 ENCOUNTER — APPOINTMENT (OUTPATIENT)
Dept: CT IMAGING | Facility: CLINIC | Age: 65
End: 2025-04-29
Attending: EMERGENCY MEDICINE
Payer: COMMERCIAL

## 2025-04-29 ENCOUNTER — HOSPITAL ENCOUNTER (EMERGENCY)
Facility: CLINIC | Age: 65
Discharge: HOME | End: 2025-04-29
Attending: EMERGENCY MEDICINE | Admitting: EMERGENCY MEDICINE
Payer: COMMERCIAL

## 2025-04-29 VITALS
SYSTOLIC BLOOD PRESSURE: 148 MMHG | RESPIRATION RATE: 18 BRPM | DIASTOLIC BLOOD PRESSURE: 75 MMHG | TEMPERATURE: 97.5 F | BODY MASS INDEX: 27.8 KG/M2 | HEIGHT: 68 IN | HEART RATE: 66 BPM | OXYGEN SATURATION: 99 % | WEIGHT: 183.42 LBS

## 2025-04-29 DIAGNOSIS — R31.0 GROSS HEMATURIA: ICD-10-CM

## 2025-04-29 DIAGNOSIS — Z48.89 ENCOUNTER FOR POST SURGICAL WOUND CHECK: ICD-10-CM

## 2025-04-29 LAB
ALBUMIN UR-MCNC: 20 MG/DL
ANION GAP SERPL CALCULATED.3IONS-SCNC: 9 MMOL/L (ref 7–15)
APPEARANCE UR: CLEAR
BASOPHILS # BLD AUTO: 0 10E3/UL (ref 0–0.2)
BASOPHILS NFR BLD AUTO: 1 %
BILIRUB UR QL STRIP: NEGATIVE
BUN SERPL-MCNC: 30.4 MG/DL (ref 8–23)
CALCIUM SERPL-MCNC: 9.3 MG/DL (ref 8.8–10.4)
CHLORIDE SERPL-SCNC: 112 MMOL/L (ref 98–107)
COLOR UR AUTO: ABNORMAL
CREAT SERPL-MCNC: 1.35 MG/DL (ref 0.67–1.17)
EGFRCR SERPLBLD CKD-EPI 2021: 59 ML/MIN/1.73M2
EOSINOPHIL # BLD AUTO: 0.2 10E3/UL (ref 0–0.7)
EOSINOPHIL NFR BLD AUTO: 3 %
ERYTHROCYTE [DISTWIDTH] IN BLOOD BY AUTOMATED COUNT: 13.2 % (ref 10–15)
GLUCOSE SERPL-MCNC: 98 MG/DL (ref 70–99)
GLUCOSE UR STRIP-MCNC: NEGATIVE MG/DL
HCO3 SERPL-SCNC: 23 MMOL/L (ref 22–29)
HCT VFR BLD AUTO: 38.4 % (ref 40–53)
HGB BLD-MCNC: 13.1 G/DL (ref 13.3–17.7)
HGB UR QL STRIP: ABNORMAL
HOLD SPECIMEN: NORMAL
HOLD SPECIMEN: NORMAL
IMM GRANULOCYTES # BLD: 0 10E3/UL
IMM GRANULOCYTES NFR BLD: 0 %
KETONES UR STRIP-MCNC: NEGATIVE MG/DL
LEUKOCYTE ESTERASE UR QL STRIP: NEGATIVE
LYMPHOCYTES # BLD AUTO: 1.6 10E3/UL (ref 0.8–5.3)
LYMPHOCYTES NFR BLD AUTO: 25 %
MCH RBC QN AUTO: 32.7 PG (ref 26.5–33)
MCHC RBC AUTO-ENTMCNC: 34.1 G/DL (ref 31.5–36.5)
MCV RBC AUTO: 96 FL (ref 78–100)
MONOCYTES # BLD AUTO: 0.7 10E3/UL (ref 0–1.3)
MONOCYTES NFR BLD AUTO: 11 %
NEUTROPHILS # BLD AUTO: 3.9 10E3/UL (ref 1.6–8.3)
NEUTROPHILS NFR BLD AUTO: 60 %
NITRATE UR QL: NEGATIVE
NRBC # BLD AUTO: 0 10E3/UL
NRBC BLD AUTO-RTO: 0 /100
PH UR STRIP: 5.5 [PH] (ref 5–7)
PLATELET # BLD AUTO: 262 10E3/UL (ref 150–450)
POTASSIUM SERPL-SCNC: 4.7 MMOL/L (ref 3.4–5.3)
RBC # BLD AUTO: 4.01 10E6/UL (ref 4.4–5.9)
RBC URINE: 49 /HPF
SODIUM SERPL-SCNC: 144 MMOL/L (ref 135–145)
SP GR UR STRIP: 1.02 (ref 1–1.03)
UROBILINOGEN UR STRIP-MCNC: NORMAL MG/DL
WBC # BLD AUTO: 6.5 10E3/UL (ref 4–11)
WBC URINE: 6 /HPF

## 2025-04-29 PROCEDURE — 85025 COMPLETE CBC W/AUTO DIFF WBC: CPT | Performed by: EMERGENCY MEDICINE

## 2025-04-29 PROCEDURE — 99285 EMERGENCY DEPT VISIT HI MDM: CPT | Mod: 25

## 2025-04-29 PROCEDURE — 80048 BASIC METABOLIC PNL TOTAL CA: CPT | Performed by: EMERGENCY MEDICINE

## 2025-04-29 PROCEDURE — 74174 CTA ABD&PLVS W/CONTRAST: CPT

## 2025-04-29 PROCEDURE — 250N000011 HC RX IP 250 OP 636: Performed by: EMERGENCY MEDICINE

## 2025-04-29 PROCEDURE — 36415 COLL VENOUS BLD VENIPUNCTURE: CPT | Performed by: EMERGENCY MEDICINE

## 2025-04-29 PROCEDURE — 81003 URINALYSIS AUTO W/O SCOPE: CPT | Performed by: EMERGENCY MEDICINE

## 2025-04-29 PROCEDURE — 250N000009 HC RX 250: Performed by: EMERGENCY MEDICINE

## 2025-04-29 RX ORDER — IOPAMIDOL 755 MG/ML
500 INJECTION, SOLUTION INTRAVASCULAR ONCE
Status: COMPLETED | OUTPATIENT
Start: 2025-04-29 | End: 2025-04-29

## 2025-04-29 RX ADMIN — SODIUM CHLORIDE 80 ML: 9 INJECTION, SOLUTION INTRAVENOUS at 16:58

## 2025-04-29 RX ADMIN — IOPAMIDOL 72 ML: 755 INJECTION, SOLUTION INTRAVENOUS at 16:58

## 2025-04-29 ASSESSMENT — ACTIVITIES OF DAILY LIVING (ADL)
ADLS_ACUITY_SCORE: 52

## 2025-04-29 ASSESSMENT — COLUMBIA-SUICIDE SEVERITY RATING SCALE - C-SSRS
2. HAVE YOU ACTUALLY HAD ANY THOUGHTS OF KILLING YOURSELF IN THE PAST MONTH?: NO
1. IN THE PAST MONTH, HAVE YOU WISHED YOU WERE DEAD OR WISHED YOU COULD GO TO SLEEP AND NOT WAKE UP?: NO
6. HAVE YOU EVER DONE ANYTHING, STARTED TO DO ANYTHING, OR PREPARED TO DO ANYTHING TO END YOUR LIFE?: NO

## 2025-04-29 NOTE — ED TRIAGE NOTES
Pt states he had a partial nephrectomy to remove a kidney about a week ago.  He states that he is now having blood in his urine.  He called [place] who want him to get a CT angiography to check for a pseudoaneurysm.       Triage Assessment (Adult)       Row Name 04/29/25 1432          Triage Assessment    Airway WDL WDL        Respiratory WDL    Respiratory WDL WDL        Cardiac WDL    Cardiac WDL WDL

## 2025-04-29 NOTE — ED PROVIDER NOTES
Emergency Department Note      History of Present Illness     Chief Complaint   Hematuria    HPI     Benedict De Anda is a 64 year old male s/p partial left nephrectomy with history of pyelonephritis who presents to the ED for evaluation of hematuria. The patient reports that his nephrectomy was a week ago and after his surgery, he was not having hematuria. When he woke up yesterday morning, he noticed a pink color to his urine when he went to the bathroom the first few times. He called his nephrologist and left a message. Throughout the day, the pink color started to clear up and it was resolved by the evening. This morning, he noticed the pink color again. Denies dysuria, nausea, vomiting, hematemesis, hemoptysis, hematochezia, or other abnormal bleeding. Notes that he is still experiencing some abdominal and left flank discomfort from the surgery. Denies blood thinner use.    Independent Historian   None    Review of External Notes   I reviewed urology telephone note from earlier today.  Patient is status post left partial nephrectomy 4/22/2025.  Note remarks patient's urine was clear postoperative but has developed hematuria.  Physician recommended evaluation to rule out pseudoaneurysm and requested presentation to ED for further evaluation.    Past Medical History     Medical History and Problem List   Acute kidney injury  Allergic rhinitis  Anxiety   Bone disorder   Colon polyp   Erectile dysfunction   GERD  Herpes simplex virus  Infectious encephalopathy   Left kidney mass  Lesion of pelvic stone  Pyelonephritis of left kidney  Severe sepsis   Tubulo interstitial nephritis   Ureteral stone    Medications   Roxicodone   Senokot   Valtrex  Viagra    Surgical History   Appendectomy  Cystourethroscopy, left ureteroscopy, left retrograde pyelogram with interpretation of intraoperative fluoroscopic imaging, holmium laser lithotripsy with basket stone extraction, left ureteral stent placement  Knee scope with  "lateral release  Robotic-assisted partial left nephrectomy     Physical Exam     Patient Vitals for the past 24 hrs:   BP Temp Temp src Pulse Resp SpO2 Height Weight   04/29/25 1433 139/85 97.5  F (36.4  C) Temporal 67 18 98 % 1.727 m (5' 8\") 83.2 kg (183 lb 6.8 oz)     Physical Exam      HEENT:    Oropharynx is moist  Eyes:    Conjunctiva normal  Neck:     Supple, no meningismus.     CV:     Regular rate and rhythm.      No murmurs, rubs or gallops.  PULM:    Clear to auscultation bilateral.       No respiratory distress.      Good air exchange.  ABD:    Soft, non-distended.       Minimal diffuse abdominal tenderness.     Well-healing surgical incisions without dehiscence, erythema, discharge, warmth     Bowel sounds normal.     No pulsatile masses.       No rebound, guarding or rigidity.     No CVA tenderness.   MSK:     No gross deformity to all four extremities.   LYMPH:   No cervical lymphadenopathy.  NEURO:   Alert.  Good muscular tone, no atrophy.   Skin:    Warm, dry and intact.    Psych:    Mood is good and affect is appropriate.      Diagnostics     Lab Results   Labs Ordered and Resulted from Time of ED Arrival to Time of ED Departure   BASIC METABOLIC PANEL - Abnormal       Result Value    Sodium 144      Potassium 4.7      Chloride 112 (*)     Carbon Dioxide (CO2) 23      Anion Gap 9      Urea Nitrogen 30.4 (*)     Creatinine 1.35 (*)     GFR Estimate 59 (*)     Calcium 9.3      Glucose 98     ROUTINE UA WITH MICROSCOPIC REFLEX TO CULTURE - Abnormal    Color Urine Light Yellow      Appearance Urine Clear      Glucose Urine Negative      Bilirubin Urine Negative      Ketones Urine Negative      Specific Gravity Urine 1.025      Blood Urine Large (*)     pH Urine 5.5      Protein Albumin Urine 20 (*)     Urobilinogen Urine Normal      Nitrite Urine Negative      Leukocyte Esterase Urine Negative      RBC Urine 49 (*)     WBC Urine 6 (*)    CBC WITH PLATELETS AND DIFFERENTIAL - Abnormal    WBC Count 6.5   "    RBC Count 4.01 (*)     Hemoglobin 13.1 (*)     Hematocrit 38.4 (*)     MCV 96      MCH 32.7      MCHC 34.1      RDW 13.2      Platelet Count 262      % Neutrophils 60      % Lymphocytes 25      % Monocytes 11      % Eosinophils 3      % Basophils 1      % Immature Granulocytes 0      NRBCs per 100 WBC 0      Absolute Neutrophils 3.9      Absolute Lymphocytes 1.6      Absolute Monocytes 0.7      Absolute Eosinophils 0.2      Absolute Basophils 0.0      Absolute Immature Granulocytes 0.0      Absolute NRBCs 0.0       Imaging   CTA Abdomen Pelvis with Contrast   Preliminary Result   IMPRESSION:   1.  Postop change of left nephrectomy. 6.4 x 3.9 x 6.2 cm collection in the surgical bed with bubbly central lucencies suggesting presence of Surgicel hemostatic material.      2.  There are a few foci of peripheral enhancement, as above. Absent noncontrast images, it is difficult to distinguish postop change from tiny pseudoaneurysms, but one most centrally located relative to the collecting system would be most suspicious as    source of hematuria. Delayed images will be obtained in attempt to further clarify etiology of these findings, with an addendum to be issued when this is complete.      3.  No significant plaque within the left renal collecting system. No hydronephrosis. Urinary bladder is similarly unremarkable.      4.  Right greater than left basilar atelectasis.      ADDENDUM: Additional images are obtained in a roughly 1.5 hour delayed fashion, without administration of any additional contrast. The small hyperdense foci described on the initial report remain unchanged. Contrast within pseudoaneurysms should have    washed out, consequently, these are all favored to reflect hyperdense postoperative material.      That said, there is ill-defined accumulation of hyperdensity within the surgical bed, see series 3 image 89. Differential would include a small urine leak (favored given absence of arterial phase  extravasation on CTA) vs a small delayed potentially    venous contrast leak.        EKG   None     Independent Interpretation   None    ED Course      Medications Administered   Medications   sodium chloride 0.9 % bag for CT scan flush (80 mLs Intravenous $Given 4/29/25 1658)   iopamidol (ISOVUE-370) solution 500 mL (72 mLs Intravenous $Given 4/29/25 1658)     Procedures   None      Discussion of Management   Urology, Dr. Cervantes ([facility])    ED Course   ED Course as of 04/29/25 2024   Tue Apr 29, 2025 1629 I obtained history and examined the patient as noted above.    1903 I rechecked and updated the patient.    1928 I consulted with Dr. Cervantes from urology at [place]. Discussed patient's presentation, findings, and plan of care.    2020 I rechecked and updated the patient.      Additional Documentation  None    Medical Decision Making / Diagnosis     CMS Diagnoses: None    MIPS       None      MDM     Benedict De Anda is a 64 year old male is 7 days postoperative left partial nephrectomy for renal mass presents today for hematuria.  He has no evidence of urinary tract infection.  He is not coagulopathic.  His urologist referred him here for to rule out pseudoaneurysm.  CTA of the abdomen performed.  Overall the CTA is reassuring.  There is a 6 cm collection of material that is most consistent with Surgicel/hemostatic agent and low suspicion for pseudoaneurysm.  I spoke with the [facility] urologist who directly reviewed the images.  She also had the [facility] radiologist review the images.  They are comfortable that this does not represent pseudoaneurysm and reports the 6 cm collection is consistent with hemostatic agents.  Patient will discharge home.  He has scheduled follow-up with his urologist tomorrow.  Hematuria likely simply secondary to partial nephrectomy without concerning sinister pathology    Disposition   The patient was discharged.     Diagnosis     ICD-10-CM    1. Gross hematuria  R31.0        2. Encounter for post surgical wound check  Z48.89          Discharge Medications   New Prescriptions    No medications on file     Scribe Disclosure:  I, ERIC VALENZUELA, am serving as a scribe at 4:20 PM on 4/29/2025 to document services personally performed by Yayo Bustillos MD based on my observations and the provider's statements to me.      Yayo Bustillos MD  04/29/25 0684

## (undated) DEVICE — FIBER LASER 200 UM DISPOSABLE TFL-FBX200S

## (undated) DEVICE — LINEN HALF SHEET 5512

## (undated) DEVICE — CATH URETERAL DUAL LUMEN 10FRX54CM M0064051000

## (undated) DEVICE — COVER FOOTSWITCH W/CINCH 20X24" 923267

## (undated) DEVICE — SHEATH URETERAL ACCESS NAVIGATOR HD 11/13FRX46CM M0062502230

## (undated) DEVICE — PACK CYSTO CUSTOM RIDGES

## (undated) DEVICE — TUBING IRRIG TUR Y TYPE 96" LF 6543-01

## (undated) DEVICE — SOL WATER IRRIG 1000ML BOTTLE 2F7114

## (undated) DEVICE — LINEN TOWEL PACK X5 5464

## (undated) DEVICE — LINEN FULL SHEET 5511

## (undated) DEVICE — CATH URETERAL FLEX TIP TIGERTAIL 06FRX70CM 139006

## (undated) DEVICE — PREP DYNA-HEX 4% CHG SCRUB 4OZ BOTTLE MDS098710

## (undated) DEVICE — GUIDEWIRE SENSOR DUAL FLEX STR 0.035"X150CM M0066703080

## (undated) DEVICE — BAG CLEAR TRASH 1.3M 39X33" P4040C

## (undated) DEVICE — CONTRAST ISOVUE 300 61% IOPAMIDOL 10X30ML VIAL 131525

## (undated) DEVICE — BASKET STONE RETRIEVAL NTNL ZERO TIP 1.9FRX90CM M0063901050

## (undated) DEVICE — SOL WATER IRRIG 3000ML BAG 2B7117

## (undated) RX ORDER — FENTANYL CITRATE 50 UG/ML
INJECTION, SOLUTION INTRAMUSCULAR; INTRAVENOUS
Status: DISPENSED
Start: 2025-02-16

## (undated) RX ORDER — ACETAMINOPHEN 325 MG/1
TABLET ORAL
Status: DISPENSED
Start: 2025-02-16

## (undated) RX ORDER — CEFAZOLIN SODIUM/WATER 2 G/20 ML
SYRINGE (ML) INTRAVENOUS
Status: DISPENSED
Start: 2025-02-16

## (undated) RX ORDER — GLYCOPYRROLATE 0.2 MG/ML
INJECTION, SOLUTION INTRAMUSCULAR; INTRAVENOUS
Status: DISPENSED
Start: 2025-02-16